# Patient Record
Sex: FEMALE | Race: WHITE | NOT HISPANIC OR LATINO
[De-identification: names, ages, dates, MRNs, and addresses within clinical notes are randomized per-mention and may not be internally consistent; named-entity substitution may affect disease eponyms.]

---

## 2019-03-21 ENCOUNTER — TRANSCRIPTION ENCOUNTER (OUTPATIENT)
Age: 74
End: 2019-03-21

## 2021-11-26 ENCOUNTER — TRANSCRIPTION ENCOUNTER (OUTPATIENT)
Age: 76
End: 2021-11-26

## 2023-03-31 ENCOUNTER — NON-APPOINTMENT (OUTPATIENT)
Age: 78
End: 2023-03-31

## 2024-03-29 ENCOUNTER — NON-APPOINTMENT (OUTPATIENT)
Age: 79
End: 2024-03-29

## 2024-04-01 ENCOUNTER — LABORATORY RESULT (OUTPATIENT)
Age: 79
End: 2024-04-01

## 2024-04-02 ENCOUNTER — APPOINTMENT (OUTPATIENT)
Dept: SURGICAL ONCOLOGY | Facility: CLINIC | Age: 79
End: 2024-04-02
Payer: MEDICARE

## 2024-04-02 VITALS
HEART RATE: 89 BPM | OXYGEN SATURATION: 100 % | DIASTOLIC BLOOD PRESSURE: 78 MMHG | HEIGHT: 68 IN | TEMPERATURE: 97.1 F | BODY MASS INDEX: 24.64 KG/M2 | WEIGHT: 162.6 LBS | SYSTOLIC BLOOD PRESSURE: 116 MMHG

## 2024-04-02 DIAGNOSIS — Z82.61 FAMILY HISTORY OF ARTHRITIS: ICD-10-CM

## 2024-04-02 DIAGNOSIS — Z85.43 PERSONAL HISTORY OF MALIGNANT NEOPLASM OF OVARY: ICD-10-CM

## 2024-04-02 DIAGNOSIS — Z80.7 FAMILY HISTORY OF OTHER MALIGNANT NEOPLASMS OF LYMPHOID, HEMATOPOIETIC AND RELATED TISSUES: ICD-10-CM

## 2024-04-02 DIAGNOSIS — E78.5 HYPERLIPIDEMIA, UNSPECIFIED: ICD-10-CM

## 2024-04-02 DIAGNOSIS — R16.0 HEPATOMEGALY, NOT ELSEWHERE CLASSIFIED: ICD-10-CM

## 2024-04-02 DIAGNOSIS — J45.909 UNSPECIFIED ASTHMA, UNCOMPLICATED: ICD-10-CM

## 2024-04-02 DIAGNOSIS — Z80.3 FAMILY HISTORY OF MALIGNANT NEOPLASM OF BREAST: ICD-10-CM

## 2024-04-02 DIAGNOSIS — Z80.6 FAMILY HISTORY OF LEUKEMIA: ICD-10-CM

## 2024-04-02 DIAGNOSIS — Z82.49 FAMILY HISTORY OF ISCHEMIC HEART DISEASE AND OTHER DISEASES OF THE CIRCULATORY SYSTEM: ICD-10-CM

## 2024-04-02 DIAGNOSIS — I10 ESSENTIAL (PRIMARY) HYPERTENSION: ICD-10-CM

## 2024-04-02 DIAGNOSIS — Z78.9 OTHER SPECIFIED HEALTH STATUS: ICD-10-CM

## 2024-04-02 LAB
AFP-TM SERPL-MCNC: 27 NG/ML
ALBUMIN SERPL ELPH-MCNC: 4.4 G/DL
ALP BLD-CCNC: 264 U/L
ALT SERPL-CCNC: 26 U/L
ANION GAP SERPL CALC-SCNC: 13 MMOL/L
APPEARANCE: CLEAR
APTT BLD: 30.2 SEC
AST SERPL-CCNC: 31 U/L
BASOPHILS # BLD AUTO: 0.03 K/UL
BASOPHILS NFR BLD AUTO: 0.4 %
BILIRUB SERPL-MCNC: 0.3 MG/DL
BILIRUBIN URINE: NEGATIVE
BLOOD URINE: NEGATIVE
BUN SERPL-MCNC: 18 MG/DL
CALCIUM SERPL-MCNC: 10.3 MG/DL
CANCER AG19-9 SERPL-ACNC: <2 U/ML
CEA SERPL-MCNC: 1.7 NG/ML
CHLORIDE SERPL-SCNC: 98 MMOL/L
CO2 SERPL-SCNC: 26 MMOL/L
COLOR: YELLOW
CREAT SERPL-MCNC: 0.92 MG/DL
EGFR: 65 ML/MIN/1.73M2
EOSINOPHIL # BLD AUTO: 0.03 K/UL
EOSINOPHIL NFR BLD AUTO: 0.4 %
GLUCOSE QUALITATIVE U: NEGATIVE MG/DL
GLUCOSE SERPL-MCNC: 107 MG/DL
HCT VFR BLD CALC: 44.2 %
HGB BLD-MCNC: 14 G/DL
IMM GRANULOCYTES NFR BLD AUTO: 0.5 %
INR PPP: 0.97 RATIO
KETONES URINE: NEGATIVE MG/DL
LEUKOCYTE ESTERASE URINE: ABNORMAL
LYMPHOCYTES # BLD AUTO: 1.36 K/UL
LYMPHOCYTES NFR BLD AUTO: 15.9 %
MAN DIFF?: NORMAL
MCHC RBC-ENTMCNC: 30.4 PG
MCHC RBC-ENTMCNC: 31.7 GM/DL
MCV RBC AUTO: 95.9 FL
MONOCYTES # BLD AUTO: 0.41 K/UL
MONOCYTES NFR BLD AUTO: 4.8 %
NEUTROPHILS # BLD AUTO: 6.69 K/UL
NEUTROPHILS NFR BLD AUTO: 78 %
NITRITE URINE: NEGATIVE
PH URINE: 6.5
PLATELET # BLD AUTO: 318 K/UL
POTASSIUM SERPL-SCNC: 5.1 MMOL/L
PROT SERPL-MCNC: 7.1 G/DL
PROTEIN URINE: NEGATIVE MG/DL
PT BLD: 11 SEC
RBC # BLD: 4.61 M/UL
RBC # FLD: 11.8 %
SODIUM SERPL-SCNC: 137 MMOL/L
SPECIFIC GRAVITY URINE: 1.01
UROBILINOGEN URINE: 0.2 MG/DL
WBC # FLD AUTO: 8.56 K/UL

## 2024-04-02 PROCEDURE — 99203 OFFICE O/P NEW LOW 30 MIN: CPT

## 2024-04-02 RX ORDER — AMLODIPINE BESYLATE AND BENAZEPRIL HYDROCHLORIDE 2.5; 1 MG/1; MG/1
2.5-1 CAPSULE ORAL
Refills: 0 | Status: ACTIVE | COMMUNITY

## 2024-04-02 RX ORDER — ATORVASTATIN CALCIUM 10 MG/1
10 TABLET, FILM COATED ORAL
Refills: 0 | Status: ACTIVE | COMMUNITY

## 2024-04-02 RX ORDER — ATENOLOL 25 MG/1
25 TABLET ORAL DAILY
Refills: 0 | Status: ACTIVE | COMMUNITY

## 2024-04-02 RX ORDER — MONTELUKAST 10 MG/1
10 TABLET, FILM COATED ORAL DAILY
Refills: 0 | Status: ACTIVE | COMMUNITY

## 2024-04-02 RX ORDER — ALBUTEROL 90 MCG
90 AEROSOL (GRAM) INHALATION 3 TIMES DAILY
Refills: 0 | Status: ACTIVE | COMMUNITY

## 2024-04-02 NOTE — ASSESSMENT
[FreeTextEntry1] : Impression) liver mass  Plan) reviewed current clinical findings and imaging with the patient and her cousin.  Has a lesion occupying the left lobe of the liver which appears to be most consistent with a primary HCC.  The left portal vein is thrombosed in the tumor is sitting very close to the right portal vein although that may be mass effect from its presence in segment 4B.  The right hepatic vein is free of disease while the left and middle's are clearly involved.  Will need an MRI of the liver with delayed phase imaging to better assess resectability.  We discussed the fact that if it is resectable that will be the primary treatment of choice and if not we will have to consider other liver directed therapies.  It does not appear to be consistent with a metastatic lesion although if there is any doubt after obtaining the MRI we may need to do a liver biopsy.  All questions answered.  We will follow-up after the liver MRI.  Moreno Loredo MD  Chief Surgical Oncology Multidisciplinary GI cancer program [facility]  Professor Surgery [facility] [employer] [facility]   CC Dr. Azevedo  Time spent reviewing images and in consultation 50 minutes

## 2024-04-02 NOTE — HISTORY OF PRESENT ILLNESS
[de-identified] : Patient Name: Rasheeda Cornejo MRN: [id number] Referring Provider: Dr. Oakes Date: 4/2/24 Diagnosis: Liver Mass  77 y/o F with PMHx of HTN, HLD asthma, ovarian cancer s/p TAHBSO in 1/2007 s/p chemotherapy, presents for evaluation of liver mass. Patient had abdominal bloating of which she got a CT A/P on 3/21/24 which showed a hepatic mass 10.5x7.5x9.2 cm with portal vein obstruction.    Currently, Patient denies changes in bowel habits, appetite changes, abdominal pain, nausea, vomiting, unintentional weight loss, fevers or chills. Bowel movements are daily and stools are soft. Denies rectal bleeding.   Functional status: The patient is able to ambulate and function fully without fatigue or dyspnea.  Work Up: 3/21/24: CT A/P- left hepatic lobe is occupied by heterogenous mass, likely represents primary hepatic malignancy, less likely metastasis. The left portal vein is obstructed by mass.    3/26/24: CEA 1.7; AFP: 27 (H), ALK: 264 (H) Last Colonoscopy 2/10/23: No polyps.

## 2024-04-02 NOTE — PHYSICAL EXAM
[Normal] : oriented to person, place and time, with appropriate affect [de-identified] : Not distended, soft.

## 2024-04-03 ENCOUNTER — APPOINTMENT (OUTPATIENT)
Dept: MRI IMAGING | Facility: CLINIC | Age: 79
End: 2024-04-03
Payer: MEDICARE

## 2024-04-03 PROCEDURE — 74183 MRI ABD W/O CNTR FLWD CNTR: CPT

## 2024-04-03 PROCEDURE — A9581: CPT | Mod: JW

## 2024-04-04 ENCOUNTER — TRANSCRIPTION ENCOUNTER (OUTPATIENT)
Age: 79
End: 2024-04-04

## 2024-04-10 ENCOUNTER — NON-APPOINTMENT (OUTPATIENT)
Age: 79
End: 2024-04-10

## 2024-04-10 NOTE — HISTORY OF PRESENT ILLNESS
[de-identified] : Patient Name: Rasheeda Cornejo MRN: [id number] Referring Provider: Dr. Oakes Date of Service: 4/15/24  Diagnosis: Liver Mass  79 y/o F with PMHx of HTN, HLD asthma, ovarian cancer s/p TAHBSO in 1/2007 s/p chemotherapy, presents for evaluation of liver mass. Patient had abdominal bloating of which she got a CT A/P on 3/21/24 which showed a hepatic mass 10.5x7.5x9.2 cm with portal vein obstruction. He is now here to discuss his MRCP results from 4/3/24 which [id number] cm hepatic mass with right intrahepatic biliary duct dilatation and mass effect on the pancreas, Primary differential consideration is cholangiocarcinoma  Currently, MS. Cornejo endorses Patient denies changes in bowel habits, appetite changes, abdominal pain, nausea, vomiting, unintentional weight loss, fevers or chills.  Functional status: The patient is able to ambulate and function fully without fatigue or dyspnea.  Work Up: 2/10/23: last colonoscopy, no polyps 3/21/24: CT A/P  left hepatic lobe is occupied by heterogenous mass, likely represents primary hepatic malignancy, less likely metastasis. The left portal vein is obstructed by mass. 4/1: CA 19-9: <35; bilis WNL; AST 31; ALT 26; Alk phos 264 (H); AFP 27 (H); CEA 1.7; coags WNL 4/3/24: MRCP: Heterogeneously enhancing 12 cm left hepatic mass with associated left greater than right intrahepatic biliary duct dilatation and exophytic component exerting mass effect on the pancreas. Primary differential consideration is cholangiocarcinoma. biopsy suggested

## 2024-04-15 ENCOUNTER — APPOINTMENT (OUTPATIENT)
Dept: INTERVENTIONAL RADIOLOGY/VASCULAR | Facility: HOSPITAL | Age: 79
End: 2024-04-15

## 2024-04-15 ENCOUNTER — APPOINTMENT (OUTPATIENT)
Dept: SURGICAL ONCOLOGY | Facility: CLINIC | Age: 79
End: 2024-04-15

## 2024-04-15 DIAGNOSIS — Z87.19 PERSONAL HISTORY OF OTHER DISEASES OF THE DIGESTIVE SYSTEM: ICD-10-CM

## 2024-04-17 PROBLEM — Z87.19 HISTORY OF GASTROESOPHAGEAL REFLUX (GERD): Status: RESOLVED | Noted: 2024-04-17 | Resolved: 2024-04-17

## 2024-04-18 VITALS
DIASTOLIC BLOOD PRESSURE: 81 MMHG | TEMPERATURE: 98 F | OXYGEN SATURATION: 95 % | HEART RATE: 101 BPM | SYSTOLIC BLOOD PRESSURE: 135 MMHG

## 2024-04-18 NOTE — REASON FOR VISIT
[Consultation] : a consultation visit [Family Member] : family member [FreeTextEntry1] : Left lobe liver tumor

## 2024-04-18 NOTE — HISTORY OF PRESENT ILLNESS
[Stable] : stable [FreeTextEntry1] : Rasheeda Cornejo is a 78 year old woman with past medical/surgical history of treated ovarian cancer who presented several months ago to her PCP with complaints of abdominal discomfort.  CT and MRI demonstrated a 12.6 x 7.6 cm left lobe tumor likely HCC but possibly cholangiocarcinoma

## 2024-04-18 NOTE — ASSESSMENT
[FreeTextEntry1] : Large left lobe hepatic tumor likely HCC but possible cholangiocarcinoma.  Candidate for Y90 .  Bilirubin and albumin within normal limits.  Will perform percutaneous biopsy at time of mapping arteriogram.  Procedure was discussed at length and she had an opportunity to ask questions all of which were answered. [SIRT Procedure & Planning] : SIRT procedure and planning discussed at length

## 2024-04-25 ENCOUNTER — RESULT REVIEW (OUTPATIENT)
Age: 79
End: 2024-04-25

## 2024-04-25 ENCOUNTER — APPOINTMENT (OUTPATIENT)
Dept: INTERVENTIONAL RADIOLOGY/VASCULAR | Facility: HOSPITAL | Age: 79
End: 2024-04-25

## 2024-04-25 ENCOUNTER — OUTPATIENT (OUTPATIENT)
Dept: OUTPATIENT SERVICES | Facility: HOSPITAL | Age: 79
LOS: 1 days | End: 2024-04-25
Payer: MEDICARE

## 2024-04-25 VITALS
DIASTOLIC BLOOD PRESSURE: 79 MMHG | SYSTOLIC BLOOD PRESSURE: 139 MMHG | HEIGHT: 68 IN | OXYGEN SATURATION: 97 % | WEIGHT: 158.95 LBS | HEART RATE: 72 BPM | TEMPERATURE: 98 F | RESPIRATION RATE: 16 BRPM

## 2024-04-25 LAB
ALBUMIN SERPL ELPH-MCNC: 4.3 G/DL — SIGNIFICANT CHANGE UP (ref 3.3–5)
ALP SERPL-CCNC: 247 U/L — HIGH (ref 40–120)
ALT FLD-CCNC: SIGNIFICANT CHANGE UP (ref 10–45)
ANION GAP SERPL CALC-SCNC: 11 MMOL/L — SIGNIFICANT CHANGE UP (ref 5–17)
AST SERPL-CCNC: SIGNIFICANT CHANGE UP (ref 10–40)
BILIRUB SERPL-MCNC: 0.3 MG/DL — SIGNIFICANT CHANGE UP (ref 0.2–1.2)
BUN SERPL-MCNC: 19 MG/DL — SIGNIFICANT CHANGE UP (ref 7–23)
CALCIUM SERPL-MCNC: 10.2 MG/DL — SIGNIFICANT CHANGE UP (ref 8.4–10.5)
CHLORIDE SERPL-SCNC: 99 MMOL/L — SIGNIFICANT CHANGE UP (ref 96–108)
CO2 SERPL-SCNC: 25 MMOL/L — SIGNIFICANT CHANGE UP (ref 22–31)
CREAT SERPL-MCNC: 0.79 MG/DL — SIGNIFICANT CHANGE UP (ref 0.5–1.3)
EGFR: 77 ML/MIN/1.73M2 — SIGNIFICANT CHANGE UP
GLUCOSE SERPL-MCNC: 119 MG/DL — HIGH (ref 70–99)
POTASSIUM SERPL-MCNC: SIGNIFICANT CHANGE UP (ref 3.5–5.3)
POTASSIUM SERPL-SCNC: SIGNIFICANT CHANGE UP (ref 3.5–5.3)
PROT SERPL-MCNC: 7.9 G/DL — SIGNIFICANT CHANGE UP (ref 6–8.3)
SODIUM SERPL-SCNC: 135 MMOL/L — SIGNIFICANT CHANGE UP (ref 135–145)

## 2024-04-25 PROCEDURE — 47000 NEEDLE BIOPSY OF LIVER PERQ: CPT

## 2024-04-25 PROCEDURE — 36248 INS CATH ABD/L-EXT ART ADDL: CPT | Mod: 59

## 2024-04-25 PROCEDURE — 88307 TISSUE EXAM BY PATHOLOGIST: CPT | Mod: 26

## 2024-04-25 PROCEDURE — 78830 RP LOCLZJ TUM SPECT W/CT 1: CPT

## 2024-04-25 PROCEDURE — 78830 RP LOCLZJ TUM SPECT W/CT 1: CPT | Mod: 26

## 2024-04-25 PROCEDURE — 88173 CYTOPATH EVAL FNA REPORT: CPT | Mod: 26

## 2024-04-25 PROCEDURE — 88341 IMHCHEM/IMCYTCHM EA ADD ANTB: CPT

## 2024-04-25 PROCEDURE — 76942 ECHO GUIDE FOR BIOPSY: CPT

## 2024-04-25 PROCEDURE — 88307 TISSUE EXAM BY PATHOLOGIST: CPT

## 2024-04-25 PROCEDURE — 88305 TISSUE EXAM BY PATHOLOGIST: CPT

## 2024-04-25 PROCEDURE — 88173 CYTOPATH EVAL FNA REPORT: CPT

## 2024-04-25 PROCEDURE — 36247 INS CATH ABD/L-EXT ART 3RD: CPT

## 2024-04-25 PROCEDURE — 88342 IMHCHEM/IMCYTCHM 1ST ANTB: CPT | Mod: 26

## 2024-04-25 PROCEDURE — 77263 THER RADIOLOGY TX PLNG CPLX: CPT

## 2024-04-25 PROCEDURE — 88305 TISSUE EXAM BY PATHOLOGIST: CPT | Mod: 26

## 2024-04-25 PROCEDURE — 36245 INS CATH ABD/L-EXT ART 1ST: CPT | Mod: 59,LT

## 2024-04-25 PROCEDURE — 36247 INS CATH ABD/L-EXT ART 3RD: CPT | Mod: LT

## 2024-04-25 PROCEDURE — 75774 ARTERY X-RAY EACH VESSEL: CPT | Mod: 26

## 2024-04-25 PROCEDURE — C1887: CPT

## 2024-04-25 PROCEDURE — A9540: CPT

## 2024-04-25 PROCEDURE — 75726 ARTERY X-RAYS ABDOMEN: CPT

## 2024-04-25 PROCEDURE — 75774 ARTERY X-RAY EACH VESSEL: CPT | Mod: 59

## 2024-04-25 PROCEDURE — 80053 COMPREHEN METABOLIC PANEL: CPT

## 2024-04-25 PROCEDURE — 77300 RADIATION THERAPY DOSE PLAN: CPT | Mod: 26

## 2024-04-25 PROCEDURE — 36248 INS CATH ABD/L-EXT ART ADDL: CPT

## 2024-04-25 PROCEDURE — 77290 THER RAD SIMULAJ FIELD CPLX: CPT

## 2024-04-25 PROCEDURE — 75726 ARTERY X-RAYS ABDOMEN: CPT | Mod: 26,59

## 2024-04-25 PROCEDURE — 76942 ECHO GUIDE FOR BIOPSY: CPT | Mod: 26,59

## 2024-04-25 PROCEDURE — 77300 RADIATION THERAPY DOSE PLAN: CPT

## 2024-04-25 PROCEDURE — 88341 IMHCHEM/IMCYTCHM EA ADD ANTB: CPT | Mod: 26

## 2024-04-25 PROCEDURE — 88360 TUMOR IMMUNOHISTOCHEM/MANUAL: CPT

## 2024-04-25 PROCEDURE — 36415 COLL VENOUS BLD VENIPUNCTURE: CPT

## 2024-04-25 PROCEDURE — 76380 CAT SCAN FOLLOW-UP STUDY: CPT | Mod: XU,MH

## 2024-04-25 PROCEDURE — 88342 IMHCHEM/IMCYTCHM 1ST ANTB: CPT

## 2024-04-25 PROCEDURE — 36245 INS CATH ABD/L-EXT ART 1ST: CPT | Mod: 59

## 2024-04-25 PROCEDURE — 77290 THER RAD SIMULAJ FIELD CPLX: CPT | Mod: 26

## 2024-04-25 PROCEDURE — C1769: CPT

## 2024-04-25 PROCEDURE — C1894: CPT

## 2024-04-25 NOTE — PRE-ANESTHESIA EVALUATION ADULT - NSANTHOSAYNRD_GEN_A_CORE
No. BHARTI screening performed.  STOP BANG Legend: 0-2 = LOW Risk; 3-4 = INTERMEDIATE Risk; 5-8 = HIGH Risk

## 2024-04-25 NOTE — PRE-ANESTHESIA EVALUATION ADULT - NSANTHPMHFT_GEN_ALL_CORE
PMHx: Mild Asthma (not intubated or Hospitalized, just 1 ER visit many years ago), HTN, Ovarian Ca, Hepatoma    PSxHx: Ovarian Cystectomy, LUIS/BSO/[place], Incisional Hernia Repair, Bilat Cataract

## 2024-04-29 LAB — NON-GYNECOLOGICAL CYTOLOGY STUDY: SIGNIFICANT CHANGE UP

## 2024-04-30 LAB — SURGICAL PATHOLOGY STUDY: SIGNIFICANT CHANGE UP

## 2024-05-01 ENCOUNTER — RESULT REVIEW (OUTPATIENT)
Age: 79
End: 2024-05-01

## 2024-05-01 ENCOUNTER — OUTPATIENT (OUTPATIENT)
Dept: OUTPATIENT SERVICES | Facility: HOSPITAL | Age: 79
LOS: 1 days | End: 2024-05-01
Payer: MEDICARE

## 2024-05-01 ENCOUNTER — APPOINTMENT (OUTPATIENT)
Dept: INTERVENTIONAL RADIOLOGY/VASCULAR | Facility: HOSPITAL | Age: 79
End: 2024-05-01

## 2024-05-01 VITALS — WEIGHT: 147.71 LBS | HEIGHT: 68 IN

## 2024-05-01 DIAGNOSIS — D37.6 NEOPLASM OF UNCERTAIN BEHAVIOR OF LIVER, GALLBLADDER AND BILE DUCTS: ICD-10-CM

## 2024-05-01 PROCEDURE — 79445 NUCLEAR RX INTRA-ARTERIAL: CPT | Mod: 26

## 2024-05-01 PROCEDURE — C1894: CPT

## 2024-05-01 PROCEDURE — 78830 RP LOCLZJ TUM SPECT W/CT 1: CPT | Mod: 26

## 2024-05-01 PROCEDURE — C1769: CPT

## 2024-05-01 PROCEDURE — 78830 RP LOCLZJ TUM SPECT W/CT 1: CPT

## 2024-05-01 PROCEDURE — 36247 INS CATH ABD/L-EXT ART 3RD: CPT | Mod: LT

## 2024-05-01 PROCEDURE — 36247 INS CATH ABD/L-EXT ART 3RD: CPT

## 2024-05-01 PROCEDURE — C1887: CPT

## 2024-05-01 PROCEDURE — C2616: CPT

## 2024-05-01 PROCEDURE — 37243 VASC EMBOLIZE/OCCLUDE ORGAN: CPT

## 2024-05-01 PROCEDURE — 79445 NUCLEAR RX INTRA-ARTERIAL: CPT

## 2024-05-01 PROCEDURE — 36248 INS CATH ABD/L-EXT ART ADDL: CPT

## 2024-05-15 ENCOUNTER — LABORATORY RESULT (OUTPATIENT)
Age: 79
End: 2024-05-15

## 2024-05-16 ENCOUNTER — APPOINTMENT (OUTPATIENT)
Dept: INTERVENTIONAL RADIOLOGY/VASCULAR | Facility: HOSPITAL | Age: 79
End: 2024-05-16

## 2024-05-16 PROCEDURE — XXXXX: CPT

## 2024-06-06 ENCOUNTER — APPOINTMENT (OUTPATIENT)
Dept: HEMATOLOGY ONCOLOGY | Facility: CLINIC | Age: 79
End: 2024-06-06
Payer: MEDICARE

## 2024-06-06 VITALS
BODY MASS INDEX: 23.95 KG/M2 | TEMPERATURE: 99.4 F | DIASTOLIC BLOOD PRESSURE: 77 MMHG | OXYGEN SATURATION: 96 % | HEIGHT: 68 IN | WEIGHT: 158 LBS | SYSTOLIC BLOOD PRESSURE: 134 MMHG | RESPIRATION RATE: 18 BRPM | HEART RATE: 81 BPM

## 2024-06-06 PROCEDURE — 99204 OFFICE O/P NEW MOD 45 MIN: CPT

## 2024-06-06 RX ORDER — OMEPRAZOLE MAGNESIUM 40 MG/1
CAPSULE, DELAYED RELEASE ORAL
Refills: 0 | Status: ACTIVE | COMMUNITY

## 2024-06-06 RX ORDER — LORATADINE 10 MG/1
TABLET ORAL
Refills: 0 | Status: ACTIVE | COMMUNITY

## 2024-06-10 ENCOUNTER — APPOINTMENT (OUTPATIENT)
Dept: INTERVENTIONAL RADIOLOGY/VASCULAR | Facility: HOSPITAL | Age: 79
End: 2024-06-10
Payer: MEDICARE

## 2024-06-10 ENCOUNTER — OUTPATIENT (OUTPATIENT)
Dept: OUTPATIENT SERVICES | Facility: HOSPITAL | Age: 79
LOS: 1 days | End: 2024-06-10
Payer: MEDICARE

## 2024-06-10 ENCOUNTER — RESULT REVIEW (OUTPATIENT)
Age: 79
End: 2024-06-10

## 2024-06-10 VITALS
HEART RATE: 75 BPM | SYSTOLIC BLOOD PRESSURE: 119 MMHG | HEIGHT: 68 IN | DIASTOLIC BLOOD PRESSURE: 74 MMHG | WEIGHT: 158.95 LBS | TEMPERATURE: 99 F | OXYGEN SATURATION: 97 % | RESPIRATION RATE: 16 BRPM

## 2024-06-10 LAB
ALBUMIN SERPL ELPH-MCNC: 4 G/DL — SIGNIFICANT CHANGE UP (ref 3.3–5)
ALP SERPL-CCNC: 313 U/L — HIGH (ref 40–120)
ALT FLD-CCNC: 27 U/L — SIGNIFICANT CHANGE UP (ref 10–45)
ANION GAP SERPL CALC-SCNC: 10 MMOL/L — SIGNIFICANT CHANGE UP (ref 5–17)
AST SERPL-CCNC: 35 U/L — SIGNIFICANT CHANGE UP (ref 10–40)
BASOPHILS # BLD AUTO: 0.05 K/UL — SIGNIFICANT CHANGE UP (ref 0–0.2)
BASOPHILS NFR BLD AUTO: 0.8 % — SIGNIFICANT CHANGE UP (ref 0–2)
BILIRUB SERPL-MCNC: 0.3 MG/DL — SIGNIFICANT CHANGE UP (ref 0.2–1.2)
BUN SERPL-MCNC: 22 MG/DL — SIGNIFICANT CHANGE UP (ref 7–23)
CALCIUM SERPL-MCNC: 10 MG/DL — SIGNIFICANT CHANGE UP (ref 8.4–10.5)
CHLORIDE SERPL-SCNC: 101 MMOL/L — SIGNIFICANT CHANGE UP (ref 96–108)
CO2 SERPL-SCNC: 27 MMOL/L — SIGNIFICANT CHANGE UP (ref 22–31)
CREAT SERPL-MCNC: 0.89 MG/DL — SIGNIFICANT CHANGE UP (ref 0.5–1.3)
EGFR: 67 ML/MIN/1.73M2 — SIGNIFICANT CHANGE UP
EOSINOPHIL # BLD AUTO: 0.15 K/UL — SIGNIFICANT CHANGE UP (ref 0–0.5)
EOSINOPHIL NFR BLD AUTO: 2.3 % — SIGNIFICANT CHANGE UP (ref 0–6)
GLUCOSE SERPL-MCNC: 107 MG/DL — HIGH (ref 70–99)
HCT VFR BLD CALC: 40 % — SIGNIFICANT CHANGE UP (ref 34.5–45)
HGB BLD-MCNC: 12.8 G/DL — SIGNIFICANT CHANGE UP (ref 11.5–15.5)
IMM GRANULOCYTES NFR BLD AUTO: 0.8 % — SIGNIFICANT CHANGE UP (ref 0–0.9)
LYMPHOCYTES # BLD AUTO: 0.58 K/UL — LOW (ref 1–3.3)
LYMPHOCYTES # BLD AUTO: 8.7 % — LOW (ref 13–44)
MCHC RBC-ENTMCNC: 30.2 PG — SIGNIFICANT CHANGE UP (ref 27–34)
MCHC RBC-ENTMCNC: 32 GM/DL — SIGNIFICANT CHANGE UP (ref 32–36)
MCV RBC AUTO: 94.3 FL — SIGNIFICANT CHANGE UP (ref 80–100)
MONOCYTES # BLD AUTO: 0.38 K/UL — SIGNIFICANT CHANGE UP (ref 0–0.9)
MONOCYTES NFR BLD AUTO: 5.7 % — SIGNIFICANT CHANGE UP (ref 2–14)
NEUTROPHILS # BLD AUTO: 5.43 K/UL — SIGNIFICANT CHANGE UP (ref 1.8–7.4)
NEUTROPHILS NFR BLD AUTO: 81.7 % — HIGH (ref 43–77)
NRBC # BLD: 0 /100 WBCS — SIGNIFICANT CHANGE UP (ref 0–0)
PLATELET # BLD AUTO: 235 K/UL — SIGNIFICANT CHANGE UP (ref 150–400)
POTASSIUM SERPL-MCNC: 4.2 MMOL/L — SIGNIFICANT CHANGE UP (ref 3.5–5.3)
POTASSIUM SERPL-SCNC: 4.2 MMOL/L — SIGNIFICANT CHANGE UP (ref 3.5–5.3)
PROT SERPL-MCNC: 7.4 G/DL — SIGNIFICANT CHANGE UP (ref 6–8.3)
RBC # BLD: 4.24 M/UL — SIGNIFICANT CHANGE UP (ref 3.8–5.2)
RBC # FLD: 12.4 % — SIGNIFICANT CHANGE UP (ref 10.3–14.5)
SODIUM SERPL-SCNC: 138 MMOL/L — SIGNIFICANT CHANGE UP (ref 135–145)
WBC # BLD: 6.64 K/UL — SIGNIFICANT CHANGE UP (ref 3.8–10.5)
WBC # FLD AUTO: 6.64 K/UL — SIGNIFICANT CHANGE UP (ref 3.8–10.5)

## 2024-06-10 PROCEDURE — C1894: CPT

## 2024-06-10 PROCEDURE — 78830 RP LOCLZJ TUM SPECT W/CT 1: CPT | Mod: 26

## 2024-06-10 PROCEDURE — 36247 INS CATH ABD/L-EXT ART 3RD: CPT | Mod: RT

## 2024-06-10 PROCEDURE — 36415 COLL VENOUS BLD VENIPUNCTURE: CPT

## 2024-06-10 PROCEDURE — C1887: CPT

## 2024-06-10 PROCEDURE — C2616: CPT

## 2024-06-10 PROCEDURE — 78830 RP LOCLZJ TUM SPECT W/CT 1: CPT

## 2024-06-10 PROCEDURE — 36248 INS CATH ABD/L-EXT ART ADDL: CPT

## 2024-06-10 PROCEDURE — 79445 NUCLEAR RX INTRA-ARTERIAL: CPT

## 2024-06-10 PROCEDURE — 79445 NUCLEAR RX INTRA-ARTERIAL: CPT | Mod: 26

## 2024-06-10 PROCEDURE — 37243 VASC EMBOLIZE/OCCLUDE ORGAN: CPT

## 2024-06-10 PROCEDURE — 85025 COMPLETE CBC W/AUTO DIFF WBC: CPT

## 2024-06-10 PROCEDURE — C1769: CPT

## 2024-06-10 PROCEDURE — 36247 INS CATH ABD/L-EXT ART 3RD: CPT

## 2024-06-10 PROCEDURE — 80053 COMPREHEN METABOLIC PANEL: CPT

## 2024-06-10 NOTE — HISTORY OF PRESENT ILLNESS
[de-identified] : 78 F with PMHx of HTN, HLD asthma, ovarian cancer, presents for 2nd opinion of recently diagnosed cholangiocarcinoma.  Oncologic History: 1.  History of ovarian cancer  --s/p TAHBSO in 1/2007, s/p chemotherapy cisplatin/taxol per patient  2.  cholangiocarcinoma - Initially presented to PCP occasional bouts of GERD. Only intermittent relief with Prilosec. Referred to new GI, found to have hepatic mass on exam only associated with a general feeling of tightness. No weight loss, abd pain, or changes in BM. - Colonoscopy 2023 by GI reported normal. - CT Abdomen w/ contrast (3/21/24): Left hepatic lobe with heterogeneous mass 10.5x7.5x9.2 cm with portal vein obstruction by mass. - Initial tumor markers (3/26/24): CEA 1.7; AFP: 27 (H), ALK: 264 (H) - MR Abdomen (4/3/24): heterogeneously enhancing 11.9 x 7.6 cm mass with small cystic component is noted in the left lobe, left greater than right intrahepatic biliary duct dilatation and exophytic component exerting mass effect on the pancreas. - On 4/25/24 Underwent percutaneous biopsy at time of mapping arteriogram, for Y90 of large left lobe hepatic tumor likely HCC but possible cholangiocarcinoma. - Pathology (4/30/24) from liver biopsy showing moderately differentiated adenocarcinoma, primary intrahepatic cholangiocarcinoma favored, though other GI cannot be excluded. Mismatch repair proteins intact. 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  TMB = 5 mut/mB, MSI-H not detected.  Additional mutations in ARID1A, ASXL1, MYC, RB1.  Cancer screening: - Uptodate on colorectal and breast cancer screening. Pending skin exam Family History: - Sister leukemia. Mother breast cancer. Father lymphoma. Paternal grandfather unspecified GI malignancy. Does not have children or other first degree relatives. 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  denies smoking, ETOH.  no children.  [de-identified] : Patient seen today for oncologic second opinion, cousin Mckenna acompanied patient. 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 The vestibular episodes have been accompanied by episodes of diziness, occasional nausea, limiting day to day. However, now back to 90% of normal, appetite returned. Energy level is fair, but doing ADLs. Normal brown bowel movements. Slight R>L swelling of LE. No other acute complaints.

## 2024-06-10 NOTE — PHYSICAL EXAM
[Restricted in physically strenuous activity but ambulatory and able to carry out work of a light or sedentary nature] : Status 1- Restricted in physically strenuous activity but ambulatory and able to carry out work of a light or sedentary nature, e.g., light house work, office work [Normal] : affect appropriate [de-identified] : R>L lower extremity edema [de-identified] : right upper quadrant/epigastric mass like object felt, non tender, no rebound or guarding

## 2024-06-10 NOTE — REVIEW OF SYSTEMS
[Fatigue] : fatigue [Dizziness] : dizziness [Difficulty Walking] : difficulty walking [Negative] : Heme/Lymph [Fever] : no fever [Chills] : no chills [Recent Change In Weight] : ~T no recent weight change [FreeTextEntry5] : slight LE R edema [de-identified] : intermittent episodes, improving, 2/2 vestibular neuritis. No motor or sensation deficits.

## 2024-06-10 NOTE — ASSESSMENT
[FreeTextEntry1] : 78 F with PMHx of HTN, HLD asthma, ovarian cancer, presents for 2nd opinion of recently diagnosed cholangiocarcinoma.  # Unresectable intrahepatic cholangiocarcinoma -- Reviewed available labs, imaging, pathology, diagnosis, and treatment with patient and cousin on initial visit. Overall, clinical picture concerning for advanced intrahepatic cholangiocarcinoma, unresectable due to obstruction of the portal vein. -- Excellent performance status: ECOG 1 -- Concur with original oncologist plan. Standard of care for unresectable intrahepatic cholangiocarcinoma in patient's with good performance status and no hyperbilirubinemia is combination chemotherapy + immunotherapy. Treatment with Durvalumab + gemcitabine + cisplatin is a category 1 NCCN recommendation. -- Briefly reviewed common systemic therapy side effects including but not limited to renal and hepatic dysfunction, low blood counts, and hair thinning/loss. Full informed consent to be obtained after completion of Y90 and subsequent imaging if receiving care at [facility] -- Reviewed rationale of germline testing given presence of BAP1 mutation onf peripheral blood NGS. Patient wishes to defer at this time.  Plan: - Complete staging work up with CT Chest with IV contrast - Will consider sending for foundation one testing on biopsy sample - f/u with Dr. May for remainder of Y90 treatment - Will schedule a follow up appointment in July after subsequent imaging to discuss timing of systemic therapy initiation. Will discuss arrangements for medi-port placement and obtain informed consent at that time. - Patient and cousin in agreement with plan and verbalized understanding. All questions answered.  RTC approximately mid-July after MRI abdomen

## 2024-06-10 NOTE — END OF VISIT
[] : Fellow [FreeTextEntry3] : I evaluated the patient with the Hematology/Oncology fellow, Dr Alan.  Briefly, the patient is a 78 year old female who is here for a 2nd opinion for recently diagnosed unresectable intrahepatic cholangiocarcinoma.  PMH is notable for ovarian cancer s/p LUIS/BSO followed by adjuvant platinum/taxane based chemotherapy in 2007.  Oncologic history; 1.  ovarian ca - 2007- LUIS/BSO, adjuvant chemotherapy  2.  intrahepatic cholangiocarcinoma --presentation:  abdominal discomfort. --CT abdomen 3/2024:  Mass in L lobe of liver with portal vein obstruction.  Mass measures 10 x 7 x 9 cm. --AFP 27, CA 19-9 normal, CEA normal. --MRI abdomen 4/2024:  11 x 7 cm mass in the L lobe.  exophytic component exerting mass efffect on pancreas.   --saw surgical oncologist.  Unresectable disease. --Biopsy:  adenocarcinoma.  MMR intact by IHC.  CK7, CK20 positive.  negative CDX2, TTF1, Gata3, PAX8, ER.  Favor cholangiocarcinoma. 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 TMB = 5 mut/mB, MSI-H not detected. Additional mutations in ARID1A, ASXL1, MYC, RB1. --Y-90 radioembolization 5/1/24.  2nd procedure planned 6/10/24  Pt presents for 2nd opinion.  Consulted w/ Dr Inge Larsen who recommended chemoimmunotherapy with cisplatin, gemcitabine, and durvalumab.    Plan: 1.  cholangiocarcinoma, intrahepatic, unresectable --history, imaging studies and biopsy results reviewed w/ the patient and her cousin --recommend chest CT scan to complete staging evaluation --discussed BAP1 mutation identified on the foundation one NGS blood report.  germline BAP1 mutations have been associated w/ hereditary cholangiocarcinoma.  Based on the type of mutation and variant allele frequency reported, germline mutation seems unlikely in her case, but nevertheless recommend consultation w/ genetic counselor for review.  She declines, rationalizing that she does not have any relatives who it may influence, and she prefers not to have that information. --discussed cancer-directed treatment.  Agree w/ her oncologist that chemoimmunotherapy with gem/cis/durva is the standard of care for 1st line treatment for advanced/unresectable cholangiocarcinoma.  Risks and side effects reviewed.  Would suggest waiting to initiate chemoimmunotherapy until after her MRI on 7/10/24. --mediport placement is encouraged for this treatment regimen --pt is still considering where she wants to receive treatment.  She was provided w/ a tour of our infusion ctr at her request.  If she opts for treatment at our center, I requested that she follow up with us after the MRI on 7/10/24 for consent.  all questions answered to her apparent satisfaction.

## 2024-06-10 NOTE — PRE-ANESTHESIA EVALUATION ADULT - NSANTHPMHFT_GEN_ALL_CORE
PMHx: Ovarian Ca 2007, HTN, HLD, Mild Asthma, Hepatocellular Ca diagnosed 4/ 2024    PSxHx: TAHBSO 2007, Ovarian Cystectomy, Incissional Hernia Repair, Cataracts

## 2024-06-27 ENCOUNTER — APPOINTMENT (OUTPATIENT)
Dept: INTERVENTIONAL RADIOLOGY/VASCULAR | Facility: HOSPITAL | Age: 79
End: 2024-06-27

## 2024-06-27 PROCEDURE — XXXXX: CPT

## 2024-07-02 PROBLEM — C22.1 CHOLANGIOCARCINOMA: Status: ACTIVE | Noted: 2024-06-06

## 2024-07-09 ENCOUNTER — NON-APPOINTMENT (OUTPATIENT)
Age: 79
End: 2024-07-09

## 2024-07-15 ENCOUNTER — APPOINTMENT (OUTPATIENT)
Dept: MRI IMAGING | Facility: HOSPITAL | Age: 79
End: 2024-07-15
Payer: MEDICARE

## 2024-07-15 ENCOUNTER — OUTPATIENT (OUTPATIENT)
Dept: OUTPATIENT SERVICES | Facility: HOSPITAL | Age: 79
LOS: 1 days | End: 2024-07-15
Payer: MEDICARE

## 2024-07-15 PROCEDURE — A9585: CPT

## 2024-07-15 PROCEDURE — 74183 MRI ABD W/O CNTR FLWD CNTR: CPT

## 2024-07-15 PROCEDURE — 74183 MRI ABD W/O CNTR FLWD CNTR: CPT | Mod: 26,MH

## 2024-07-17 ENCOUNTER — APPOINTMENT (OUTPATIENT)
Dept: HEMATOLOGY ONCOLOGY | Facility: CLINIC | Age: 79
End: 2024-07-17
Payer: MEDICARE

## 2024-07-17 VITALS
BODY MASS INDEX: 23.34 KG/M2 | OXYGEN SATURATION: 98 % | RESPIRATION RATE: 18 BRPM | DIASTOLIC BLOOD PRESSURE: 86 MMHG | HEART RATE: 83 BPM | SYSTOLIC BLOOD PRESSURE: 144 MMHG | WEIGHT: 154 LBS | HEIGHT: 68 IN | TEMPERATURE: 97.9 F

## 2024-07-17 DIAGNOSIS — T45.1X5A TOXIC GASTROENTERITIS AND COLITIS: ICD-10-CM

## 2024-07-17 DIAGNOSIS — T45.1X5A NAUSEA WITH VOMITING, UNSPECIFIED: ICD-10-CM

## 2024-07-17 DIAGNOSIS — R11.2 NAUSEA WITH VOMITING, UNSPECIFIED: ICD-10-CM

## 2024-07-17 DIAGNOSIS — K52.1 TOXIC GASTROENTERITIS AND COLITIS: ICD-10-CM

## 2024-07-17 DIAGNOSIS — C22.1 INTRAHEPATIC BILE DUCT CARCINOMA: ICD-10-CM

## 2024-07-17 LAB
ALBUMIN SERPL ELPH-MCNC: 3.6 G/DL
ALP BLD-CCNC: 168 U/L
ALT SERPL-CCNC: 19 U/L
ANION GAP SERPL CALC-SCNC: -3 MMOL/L
APTT BLD: 34.3 SEC
AST SERPL-CCNC: 26 U/L
BILIRUB SERPL-MCNC: 0.6 MG/DL
BUN SERPL-MCNC: 16 MG/DL
CALCIUM SERPL-MCNC: 10.5 MG/DL
CHLORIDE SERPL-SCNC: 105 MMOL/L
CO2 SERPL-SCNC: 31 MMOL/L
CREAT SERPL-MCNC: 1 MG/DL
EGFR: 58 ML/MIN/1.73M2
GLUCOSE SERPL-MCNC: 102 MG/DL
HBV CORE IGG+IGM SER QL: NONREACTIVE
HBV SURFACE AB SER QL: NONREACTIVE
HBV SURFACE AG SER QL: NONREACTIVE
HCT VFR BLD CALC: 38.6 %
HGB BLD-MCNC: 12.5 G/DL
INR PPP: 0.95
LYMPHOCYTES # BLD AUTO: 0.5 K/UL
LYMPHOCYTES NFR BLD AUTO: 5.8 %
MAN DIFF?: NO
MCHC RBC-ENTMCNC: 30.1 PG
MCHC RBC-ENTMCNC: 32.4 GM/DL
MCV RBC AUTO: 93 FL
NEUTROPHILS # BLD AUTO: 8.1 K/UL
NEUTROPHILS NFR BLD AUTO: 88.2 %
PLATELET # BLD AUTO: 262 K/UL
POTASSIUM SERPL-SCNC: 5.2 MMOL/L
PROT SERPL-MCNC: 7.2 G/DL
PT BLD: 10.8 SEC
RBC # BLD: 4.15 M/UL
RBC # FLD: 12.7 %
SODIUM SERPL-SCNC: 133 MMOL/L
WBC # FLD AUTO: 9.1 K/UL

## 2024-07-17 PROCEDURE — 99215 OFFICE O/P EST HI 40 MIN: CPT | Mod: 25

## 2024-07-17 PROCEDURE — 36415 COLL VENOUS BLD VENIPUNCTURE: CPT

## 2024-07-17 RX ORDER — LOPERAMIDE HYDROCHLORIDE 2 MG/1
2 CAPSULE ORAL
Qty: 80 | Refills: 4 | Status: ACTIVE | COMMUNITY
Start: 2024-07-17 | End: 1900-01-01

## 2024-07-17 RX ORDER — ONDANSETRON 8 MG/1
8 TABLET, ORALLY DISINTEGRATING ORAL EVERY 8 HOURS
Qty: 60 | Refills: 5 | Status: ACTIVE | COMMUNITY
Start: 2024-07-17 | End: 1900-01-01

## 2024-07-18 ENCOUNTER — APPOINTMENT (OUTPATIENT)
Dept: INTERVENTIONAL RADIOLOGY/VASCULAR | Facility: HOSPITAL | Age: 79
End: 2024-07-18

## 2024-07-19 LAB
AFP-TM SERPL-MCNC: 12 NG/ML
CANCER AG19-9 SERPL-ACNC: <2 U/ML
CEA SERPL-MCNC: 1.7 NG/ML

## 2024-07-22 ENCOUNTER — APPOINTMENT (OUTPATIENT)
Dept: INTERVENTIONAL RADIOLOGY/VASCULAR | Facility: HOSPITAL | Age: 79
End: 2024-07-22

## 2024-07-22 ENCOUNTER — RESULT REVIEW (OUTPATIENT)
Age: 79
End: 2024-07-22

## 2024-07-22 RX ORDER — ZINC SULFATE 50(220)MG
220 (50 ZN) CAPSULE ORAL
Qty: 60 | Refills: 0 | Status: ACTIVE | COMMUNITY
Start: 2024-06-21

## 2024-07-22 RX ORDER — LORAZEPAM 0.5 MG/1
0.5 TABLET ORAL
Qty: 30 | Refills: 0 | Status: ACTIVE | COMMUNITY
Start: 2024-06-05

## 2024-07-24 ENCOUNTER — APPOINTMENT (OUTPATIENT)
Dept: CT IMAGING | Facility: HOSPITAL | Age: 79
End: 2024-07-24

## 2024-07-31 ENCOUNTER — NON-APPOINTMENT (OUTPATIENT)
Age: 79
End: 2024-07-31

## 2024-08-01 RX ORDER — DEXTROSE MONOHYDRATE, SODIUM CHLORIDE, SODIUM LACTATE, CALCIUM CHLORIDE, MAGNESIUM CHLORIDE 1.5; 538; 448; 18.4; 5.08 G/100ML; MG/100ML; MG/100ML; MG/100ML; MG/100ML
1000 SOLUTION INTRAPERITONEAL
Refills: 0 | Status: COMPLETED | OUTPATIENT
Start: 2024-08-02 | End: 2024-08-02

## 2024-08-02 ENCOUNTER — APPOINTMENT (OUTPATIENT)
Dept: INFUSION THERAPY | Facility: CLINIC | Age: 79
End: 2024-08-02

## 2024-08-02 ENCOUNTER — OUTPATIENT (OUTPATIENT)
Dept: OUTPATIENT SERVICES | Facility: HOSPITAL | Age: 79
LOS: 1 days | End: 2024-08-02
Payer: MEDICARE

## 2024-08-02 VITALS
WEIGHT: 156.09 LBS | RESPIRATION RATE: 18 BRPM | DIASTOLIC BLOOD PRESSURE: 82 MMHG | HEIGHT: 68 IN | OXYGEN SATURATION: 97 % | HEART RATE: 94 BPM | TEMPERATURE: 98 F | SYSTOLIC BLOOD PRESSURE: 131 MMHG

## 2024-08-02 VITALS
HEART RATE: 73 BPM | RESPIRATION RATE: 18 BRPM | DIASTOLIC BLOOD PRESSURE: 67 MMHG | TEMPERATURE: 98 F | SYSTOLIC BLOOD PRESSURE: 115 MMHG | OXYGEN SATURATION: 97 %

## 2024-08-02 DIAGNOSIS — C22.1 INTRAHEPATIC BILE DUCT CARCINOMA: ICD-10-CM

## 2024-08-02 LAB
ALBUMIN SERPL ELPH-MCNC: 3.9 G/DL — SIGNIFICANT CHANGE UP (ref 3.3–5)
ALP SERPL-CCNC: 159 U/L — HIGH (ref 40–120)
ALT FLD-CCNC: 20 U/L — SIGNIFICANT CHANGE UP (ref 10–45)
ANION GAP SERPL CALC-SCNC: 3 MMOL/L — LOW (ref 5–17)
AST SERPL-CCNC: 31 U/L — SIGNIFICANT CHANGE UP (ref 10–40)
BILIRUB SERPL-MCNC: 0.7 MG/DL — SIGNIFICANT CHANGE UP (ref 0.2–1.2)
BUN SERPL-MCNC: 13 MG/DL — SIGNIFICANT CHANGE UP (ref 7–23)
CALCIUM SERPL-MCNC: 10.3 MG/DL — SIGNIFICANT CHANGE UP (ref 8.4–10.5)
CHLORIDE SERPL-SCNC: 103 MMOL/L — SIGNIFICANT CHANGE UP (ref 96–108)
CO2 SERPL-SCNC: 29 MMOL/L — SIGNIFICANT CHANGE UP (ref 22–31)
CREAT SERPL-MCNC: 0.8 MG/DL — SIGNIFICANT CHANGE UP (ref 0.5–1.3)
EGFR: 76 ML/MIN/1.73M2 — SIGNIFICANT CHANGE UP
GLUCOSE SERPL-MCNC: 111 MG/DL — HIGH (ref 70–99)
HCT VFR BLD CALC: 37.5 % — SIGNIFICANT CHANGE UP (ref 34.5–45)
HGB BLD-MCNC: 12.3 G/DL — SIGNIFICANT CHANGE UP (ref 11.5–15.5)
LYMPHOCYTES # BLD AUTO: 0.6 K/UL — LOW (ref 1–3.3)
LYMPHOCYTES # BLD AUTO: 8.5 % — LOW (ref 13–44)
MAGNESIUM SERPL-MCNC: 1.9 MG/DL — SIGNIFICANT CHANGE UP (ref 1.6–2.6)
MCHC RBC-ENTMCNC: 30.2 PG — SIGNIFICANT CHANGE UP (ref 27–34)
MCHC RBC-ENTMCNC: 32.8 GM/DL — SIGNIFICANT CHANGE UP (ref 32–36)
MCV RBC AUTO: 92.1 FL — SIGNIFICANT CHANGE UP (ref 80–100)
NEUTROPHILS # BLD AUTO: 6.2 K/UL — SIGNIFICANT CHANGE UP (ref 1.8–7.4)
NEUTROPHILS NFR BLD AUTO: 85.7 % — HIGH (ref 43–77)
PLATELET # BLD AUTO: 207 K/UL — SIGNIFICANT CHANGE UP (ref 150–400)
POTASSIUM SERPL-MCNC: 5 MMOL/L — SIGNIFICANT CHANGE UP (ref 3.5–5.3)
POTASSIUM SERPL-SCNC: 5 MMOL/L — SIGNIFICANT CHANGE UP (ref 3.5–5.3)
PROT SERPL-MCNC: 7.2 G/DL — SIGNIFICANT CHANGE UP (ref 6–8.3)
RBC # BLD: 4.07 M/UL — SIGNIFICANT CHANGE UP (ref 3.8–5.2)
RBC # FLD: 12.9 % — SIGNIFICANT CHANGE UP (ref 10.3–14.5)
SODIUM SERPL-SCNC: 135 MMOL/L — SIGNIFICANT CHANGE UP (ref 135–145)
TSH SERPL-MCNC: 0.96 UIU/ML — SIGNIFICANT CHANGE UP (ref 0.27–4.2)
WBC # BLD: 7.2 K/UL — SIGNIFICANT CHANGE UP (ref 3.8–10.5)
WBC # FLD AUTO: 7.2 K/UL — SIGNIFICANT CHANGE UP (ref 3.8–10.5)

## 2024-08-02 PROCEDURE — 96417 CHEMO IV INFUS EACH ADDL SEQ: CPT

## 2024-08-02 PROCEDURE — 96413 CHEMO IV INFUSION 1 HR: CPT

## 2024-08-02 PROCEDURE — 36415 COLL VENOUS BLD VENIPUNCTURE: CPT

## 2024-08-02 PROCEDURE — 96367 TX/PROPH/DG ADDL SEQ IV INF: CPT

## 2024-08-02 PROCEDURE — 85025 COMPLETE CBC W/AUTO DIFF WBC: CPT

## 2024-08-02 PROCEDURE — 83735 ASSAY OF MAGNESIUM: CPT

## 2024-08-02 PROCEDURE — 96375 TX/PRO/DX INJ NEW DRUG ADDON: CPT

## 2024-08-02 PROCEDURE — 80053 COMPREHEN METABOLIC PANEL: CPT

## 2024-08-02 PROCEDURE — 84443 ASSAY THYROID STIM HORMONE: CPT

## 2024-08-02 RX ORDER — DEXAMETHASONE 1.5 MG/1
10 TABLET ORAL ONCE
Refills: 0 | Status: COMPLETED | OUTPATIENT
Start: 2024-08-02 | End: 2024-08-02

## 2024-08-02 RX ORDER — PALONOSETRON HYDROCHLORIDE 0.25 MG/5ML
0.25 INJECTION, SOLUTION INTRAVENOUS ONCE
Refills: 0 | Status: COMPLETED | OUTPATIENT
Start: 2024-08-02 | End: 2024-08-02

## 2024-08-02 RX ORDER — GEMCITABINE 38 MG/ML
1800 INJECTION, SOLUTION INTRAVENOUS ONCE
Refills: 0 | Status: COMPLETED | OUTPATIENT
Start: 2024-08-02 | End: 2024-08-02

## 2024-08-02 RX ORDER — BACTERIOSTATIC SODIUM CHLORIDE 0.9 %
500 VIAL (ML) INJECTION ONCE
Refills: 0 | Status: COMPLETED | OUTPATIENT
Start: 2024-08-02 | End: 2024-08-02

## 2024-08-02 RX ORDER — FOSAPREPITANT DIMEGLUMINE 150 MG/5ML
150 INJECTION, POWDER, LYOPHILIZED, FOR SOLUTION INTRAVENOUS ONCE
Refills: 0 | Status: COMPLETED | OUTPATIENT
Start: 2024-08-02 | End: 2024-08-02

## 2024-08-02 RX ORDER — BACTERIOSTATIC SODIUM CHLORIDE 0.9 %
10 VIAL (ML) INJECTION ONCE
Refills: 0 | Status: COMPLETED | OUTPATIENT
Start: 2024-08-02 | End: 2024-08-02

## 2024-08-02 RX ORDER — LIDOCAINE 5% 5 G/100G
1 CREAM TOPICAL ONCE
Refills: 0 | Status: COMPLETED | OUTPATIENT
Start: 2024-08-02 | End: 2024-08-02

## 2024-08-02 RX ORDER — DURVALUMAB 500 MG/10ML
1500 INJECTION, SOLUTION INTRAVENOUS ONCE
Refills: 0 | Status: COMPLETED | OUTPATIENT
Start: 2024-08-02 | End: 2024-08-02

## 2024-08-02 RX ORDER — CISPLATIN 1 MG/ML
45 VIAL (ML) INTRAVENOUS ONCE
Refills: 0 | Status: COMPLETED | OUTPATIENT
Start: 2024-08-02 | End: 2024-08-02

## 2024-08-02 RX ADMIN — DURVALUMAB 1500 MILLIGRAM(S): 500 INJECTION, SOLUTION INTRAVENOUS at 12:30

## 2024-08-02 RX ADMIN — FOSAPREPITANT DIMEGLUMINE 150 MILLIGRAM(S): 150 INJECTION, POWDER, LYOPHILIZED, FOR SOLUTION INTRAVENOUS at 14:33

## 2024-08-02 RX ADMIN — Medication 45 MILLIGRAM(S): at 14:47

## 2024-08-02 RX ADMIN — GEMCITABINE 1800 MILLIGRAM(S): 38 INJECTION, SOLUTION INTRAVENOUS at 15:50

## 2024-08-02 RX ADMIN — DEXTROSE MONOHYDRATE, SODIUM CHLORIDE, SODIUM LACTATE, CALCIUM CHLORIDE, MAGNESIUM CHLORIDE 1000 MILLILITER(S): 1.5; 538; 448; 18.4; 5.08 SOLUTION INTRAPERITONEAL at 18:00

## 2024-08-02 RX ADMIN — DEXTROSE MONOHYDRATE, SODIUM CHLORIDE, SODIUM LACTATE, CALCIUM CHLORIDE, MAGNESIUM CHLORIDE 507 MILLILITER(S): 1.5; 538; 448; 18.4; 5.08 SOLUTION INTRAPERITONEAL at 16:25

## 2024-08-02 RX ADMIN — Medication 45 MILLIGRAM(S): at 15:47

## 2024-08-02 RX ADMIN — PALONOSETRON HYDROCHLORIDE 0.25 MILLIGRAM(S): 0.25 INJECTION, SOLUTION INTRAVENOUS at 13:38

## 2024-08-02 RX ADMIN — Medication 1000 MILLILITER(S): at 11:45

## 2024-08-02 RX ADMIN — DEXAMETHASONE 204 MILLIGRAM(S): 1.5 TABLET ORAL at 13:38

## 2024-08-02 RX ADMIN — DURVALUMAB 1500 MILLIGRAM(S): 500 INJECTION, SOLUTION INTRAVENOUS at 13:30

## 2024-08-02 RX ADMIN — Medication 10 MILLILITER(S): at 11:45

## 2024-08-02 RX ADMIN — Medication 500 MILLILITER(S): at 12:15

## 2024-08-02 RX ADMIN — GEMCITABINE 1800 MILLIGRAM(S): 38 INJECTION, SOLUTION INTRAVENOUS at 16:20

## 2024-08-02 RX ADMIN — FOSAPREPITANT DIMEGLUMINE 500 MILLIGRAM(S): 150 INJECTION, POWDER, LYOPHILIZED, FOR SOLUTION INTRAVENOUS at 14:03

## 2024-08-02 RX ADMIN — DEXAMETHASONE 10 MILLIGRAM(S): 1.5 TABLET ORAL at 13:53

## 2024-08-05 NOTE — REVIEW OF SYSTEMS
[Fever] : no fever [Chills] : no chills [Fatigue] : fatigue [Recent Change In Weight] : ~T no recent weight change [Dizziness] : dizziness [Difficulty Walking] : difficulty walking [Negative] : Heme/Lymph [de-identified] : Dizziness and difficulty walking much improved from last visit.

## 2024-08-05 NOTE — REVIEW OF SYSTEMS
[Fever] : no fever [Chills] : no chills [Fatigue] : fatigue [Recent Change In Weight] : ~T no recent weight change [Dizziness] : dizziness [Difficulty Walking] : difficulty walking [Negative] : Heme/Lymph [de-identified] : Dizziness and difficulty walking much improved from last visit.

## 2024-08-05 NOTE — END OF VISIT
[] : Fellow [FreeTextEntry3] : I evaluated the patient with the Hematology/Oncology fellow, Dr Chacon.  Briefly, the patient is a 78 year old female who is here for follow up for unresectable intrahepatic cholangiocarcinoma.  PMH is notable for ovarian cancer s/p LUIS/BSO followed by adjuvant platinum/taxane based chemotherapy in 2007.  Oncologic history; 1.  ovarian ca - 2007- LUIS/BSO, adjuvant chemotherapy  2.  intrahepatic cholangiocarcinoma --presentation:  abdominal discomfort. --CT abdomen 3/2024:  Mass in L lobe of liver with portal vein obstruction.  Mass measures 10 x 7 x 9 cm. --AFP 27, CA 19-9 normal, CEA normal. --MRI abdomen 4/2024:  11 x 7 cm mass in the L lobe.  exophytic component exerting mass efffect on pancreas.   --saw surgical oncologist.  Unresectable disease. --Biopsy:  adenocarcinoma.  MMR intact by IHC.  CK7, CK20 positive.  negative CDX2, TTF1, Gata3, PAX8, ER.  Favor cholangiocarcinoma. 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 TMB = 5 mut/mB, MSI-H not detected. Additional mutations in ARID1A, ASXL1, MYC, RB1. --Y-90 radioembolization 5/1/24.  2nd dose on 6/10/24  Doing well at this visit.  No lingering side effects from Y 90.  ready to proceed w/ systemic therapy.  Plan: 1.  cholangiocarcinoma, intrahepatic, unresectable --history, imaging studies and biopsy results reviewed w/ the patient and her cousin at the previous visit. --recommend chest CT scan to complete staging evaluation. ordered. --Recommend proceeding with chemoimmunotherapy with gem/cis/durvalumab, as is the standard of care for 1st line treatment for advanced/unresectable cholangiocarcinoma.  Risks and side effects reviewed.  Literature provided for her to take home to review.  informed consent for treatment obtained. --supportive care meds prescribed. --pre-chemo labs including CBC, CMP, hepatitis B studies, tumor markers ordered. --mediport placement is encouraged for this treatment regimen - will arrange scheduling. --MRI abdomen report pending at the time of the visit but per verbal discussion w/ Dr May, she has had a good initial response to Y-90 with no extra-hepatic disease progression.  all questions answered to her apparent satisfaction.

## 2024-08-07 RX ORDER — DEXTROSE MONOHYDRATE, SODIUM CHLORIDE, SODIUM LACTATE, CALCIUM CHLORIDE, MAGNESIUM CHLORIDE 1.5; 538; 448; 18.4; 5.08 G/100ML; MG/100ML; MG/100ML; MG/100ML; MG/100ML
1000 SOLUTION INTRAPERITONEAL
Refills: 0 | Status: COMPLETED | OUTPATIENT
Start: 2024-08-08 | End: 2024-08-08

## 2024-08-07 RX ORDER — BACTERIOSTATIC SODIUM CHLORIDE 0.9 %
10 VIAL (ML) INJECTION ONCE
Refills: 0 | Status: COMPLETED | OUTPATIENT
Start: 2024-08-08 | End: 2024-08-08

## 2024-08-08 ENCOUNTER — OUTPATIENT (OUTPATIENT)
Dept: OUTPATIENT SERVICES | Facility: HOSPITAL | Age: 79
LOS: 1 days | End: 2024-08-08
Payer: MEDICARE

## 2024-08-08 ENCOUNTER — APPOINTMENT (OUTPATIENT)
Dept: INFUSION THERAPY | Facility: CLINIC | Age: 79
End: 2024-08-08

## 2024-08-08 ENCOUNTER — APPOINTMENT (OUTPATIENT)
Dept: HEMATOLOGY ONCOLOGY | Facility: CLINIC | Age: 79
End: 2024-08-08

## 2024-08-08 VITALS
OXYGEN SATURATION: 99 % | HEART RATE: 80 BPM | RESPIRATION RATE: 17 BRPM | SYSTOLIC BLOOD PRESSURE: 112 MMHG | DIASTOLIC BLOOD PRESSURE: 72 MMHG | TEMPERATURE: 98 F

## 2024-08-08 VITALS
RESPIRATION RATE: 18 BRPM | TEMPERATURE: 98 F | DIASTOLIC BLOOD PRESSURE: 87 MMHG | HEART RATE: 73 BPM | OXYGEN SATURATION: 98 % | HEIGHT: 68 IN | WEIGHT: 151.9 LBS | SYSTOLIC BLOOD PRESSURE: 142 MMHG

## 2024-08-08 DIAGNOSIS — C22.1 INTRAHEPATIC BILE DUCT CARCINOMA: ICD-10-CM

## 2024-08-08 PROCEDURE — 96413 CHEMO IV INFUSION 1 HR: CPT

## 2024-08-08 PROCEDURE — 96417 CHEMO IV INFUS EACH ADDL SEQ: CPT

## 2024-08-08 PROCEDURE — 99214 OFFICE O/P EST MOD 30 MIN: CPT | Mod: 25

## 2024-08-08 PROCEDURE — 36415 COLL VENOUS BLD VENIPUNCTURE: CPT

## 2024-08-08 PROCEDURE — 96367 TX/PROPH/DG ADDL SEQ IV INF: CPT

## 2024-08-08 PROCEDURE — 96375 TX/PRO/DX INJ NEW DRUG ADDON: CPT

## 2024-08-08 RX ORDER — DEXAMETHASONE 1.5 MG/1
10 TABLET ORAL ONCE
Refills: 0 | Status: COMPLETED | OUTPATIENT
Start: 2024-08-08 | End: 2024-08-08

## 2024-08-08 RX ORDER — FOSAPREPITANT DIMEGLUMINE 150 MG/5ML
150 INJECTION, POWDER, LYOPHILIZED, FOR SOLUTION INTRAVENOUS ONCE
Refills: 0 | Status: COMPLETED | OUTPATIENT
Start: 2024-08-08 | End: 2024-08-08

## 2024-08-08 RX ORDER — BACTERIOSTATIC SODIUM CHLORIDE 0.9 %
500 VIAL (ML) INJECTION ONCE
Refills: 0 | Status: COMPLETED | OUTPATIENT
Start: 2024-08-08 | End: 2024-08-08

## 2024-08-08 RX ORDER — CISPLATIN 1 MG/ML
45 VIAL (ML) INTRAVENOUS ONCE
Refills: 0 | Status: COMPLETED | OUTPATIENT
Start: 2024-08-08 | End: 2024-08-08

## 2024-08-08 RX ORDER — LIDOCAINE 5% 5 G/100G
1 CREAM TOPICAL ONCE
Refills: 0 | Status: COMPLETED | OUTPATIENT
Start: 2024-08-08 | End: 2024-08-08

## 2024-08-08 RX ORDER — GEMCITABINE 38 MG/ML
1800 INJECTION, SOLUTION INTRAVENOUS ONCE
Refills: 0 | Status: COMPLETED | OUTPATIENT
Start: 2024-08-08 | End: 2024-08-08

## 2024-08-08 RX ORDER — PALONOSETRON HYDROCHLORIDE 0.25 MG/5ML
0.25 INJECTION, SOLUTION INTRAVENOUS ONCE
Refills: 0 | Status: COMPLETED | OUTPATIENT
Start: 2024-08-08 | End: 2024-08-08

## 2024-08-08 RX ADMIN — Medication 45 MILLIGRAM(S): at 11:40

## 2024-08-08 RX ADMIN — Medication 500 MILLILITER(S): at 10:45

## 2024-08-08 RX ADMIN — PALONOSETRON HYDROCHLORIDE 0.25 MILLIGRAM(S): 0.25 INJECTION, SOLUTION INTRAVENOUS at 11:40

## 2024-08-08 RX ADMIN — Medication 10 MILLILITER(S): at 15:25

## 2024-08-08 RX ADMIN — DEXAMETHASONE 10 MILLIGRAM(S): 1.5 TABLET ORAL at 10:55

## 2024-08-08 RX ADMIN — DEXTROSE MONOHYDRATE, SODIUM CHLORIDE, SODIUM LACTATE, CALCIUM CHLORIDE, MAGNESIUM CHLORIDE 1000 MILLILITER(S): 1.5; 538; 448; 18.4; 5.08 SOLUTION INTRAPERITONEAL at 15:20

## 2024-08-08 RX ADMIN — DEXAMETHASONE 204 MILLIGRAM(S): 1.5 TABLET ORAL at 10:40

## 2024-08-08 RX ADMIN — FOSAPREPITANT DIMEGLUMINE 150 MILLIGRAM(S): 150 INJECTION, POWDER, LYOPHILIZED, FOR SOLUTION INTRAVENOUS at 11:35

## 2024-08-08 RX ADMIN — LIDOCAINE 5% 1 APPLICATION(S): 5 CREAM TOPICAL at 09:00

## 2024-08-08 RX ADMIN — FOSAPREPITANT DIMEGLUMINE 500 MILLIGRAM(S): 150 INJECTION, POWDER, LYOPHILIZED, FOR SOLUTION INTRAVENOUS at 11:01

## 2024-08-08 RX ADMIN — GEMCITABINE 1800 MILLIGRAM(S): 38 INJECTION, SOLUTION INTRAVENOUS at 11:41

## 2024-08-08 RX ADMIN — Medication 45 MILLIGRAM(S): at 13:20

## 2024-08-08 RX ADMIN — Medication 1000 MILLILITER(S): at 10:10

## 2024-08-08 RX ADMIN — DEXTROSE MONOHYDRATE, SODIUM CHLORIDE, SODIUM LACTATE, CALCIUM CHLORIDE, MAGNESIUM CHLORIDE 507 MILLILITER(S): 1.5; 538; 448; 18.4; 5.08 SOLUTION INTRAPERITONEAL at 13:20

## 2024-08-08 RX ADMIN — GEMCITABINE 1800 MILLIGRAM(S): 38 INJECTION, SOLUTION INTRAVENOUS at 12:00

## 2024-08-08 NOTE — HISTORY OF PRESENT ILLNESS
[de-identified] : Rasheeda Cornejo is a 78 year old female who is here for follow up for unresectable intrahepatic cholangiocarcinoma.   Oncologic History: 1. ovarian ca - 2007- LUIS/BSO, adjuvant chemotherapy  2. intrahepatic cholangiocarcinoma --presentation: abdominal discomfort. --CT abdomen 3/2024: Mass in L lobe of liver with portal vein obstruction. Mass measures 10 x 7 x 9 cm. --AFP 27, CA 19-9 normal, CEA normal. --MRI abdomen 4/2024: 11 x 7 cm mass in the L lobe. exophytic component exerting mass efffect on pancreas. --saw surgical oncologist. Unresectable disease. --Biopsy: adenocarcinoma. MMR intact by IHC. CK7, CK20 positive. negative CDX2, TTF1, Gata3, PAX8, ER. Favor cholangiocarcinoma. 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 TMB = 5 mut/mB, MSI-H not detected. Additional mutations in ARID1A, ASXL1, MYC, RB1.  --Y-90 radioembolization 5/1/24. 2nd dose on 6/10/24 --pt declined germline genetic testing for BAP1 mutation --7/15/24 MR Abdomen: Since April 3, 2024, significantly decreased enhancement and slightly decreased size of intrahepatic cholangiocarcinoma in left lobe status post Y-90. --7/24/24: CT Chest: No definite evidence of metastatic cholangiocarcinoma in the chest. A few tiny lung nodules have nonspecific appearances.  --initiated chemoimmunotherapy with gemcitabine/cisplatin/durvalumab on 8/2/24  PMH is notable for ovarian cancer s/p LUIS/BSO followed by adjuvant platinum/taxane based chemotherapy in 2007. Vestibular neuritis FH:  Sister leukemia. Mother breast cancer. Father lymphoma. Paternal grandfather unspecified GI malignancy. Does not have children or other first degree relatives. 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  no children. [de-identified] : Here for cycle 1 day 8 cisplatin/gemcitabine.  she tolerated cycle 1 chemotherapy well. No immune toxicities noted at this visit.  eating and BM are ok, weight stable.  fatigue, stable and manageable. Symptoms of vestibular neuritis - blurry vision, vertigo, headache - slightly worse after the chemo last week but now improved.   she had hearing test in May, which shows " normal and pass" overall she feels ok, denies nausea/ vomiting, constipation, diarrhea, skin rash/itching, hearing loss, fever, cough, or any sites of pain.  she asked if she could get flu and covid vaccine when available.  Not recommend at this time.

## 2024-08-08 NOTE — HISTORY OF PRESENT ILLNESS
[de-identified] : Rasheeda Cornejo is a 78 year old female who is here for follow up for unresectable intrahepatic cholangiocarcinoma.   Oncologic History: 1. ovarian ca - 2007- LUIS/BSO, adjuvant chemotherapy  2. intrahepatic cholangiocarcinoma --presentation: abdominal discomfort. --CT abdomen 3/2024: Mass in L lobe of liver with portal vein obstruction. Mass measures 10 x 7 x 9 cm. --AFP 27, CA 19-9 normal, CEA normal. --MRI abdomen 4/2024: 11 x 7 cm mass in the L lobe. exophytic component exerting mass efffect on pancreas. --saw surgical oncologist. Unresectable disease. --Biopsy: adenocarcinoma. MMR intact by IHC. CK7, CK20 positive. negative CDX2, TTF1, Gata3, PAX8, ER. Favor cholangiocarcinoma. 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 TMB = 5 mut/mB, MSI-H not detected. Additional mutations in ARID1A, ASXL1, MYC, RB1.  --Y-90 radioembolization 5/1/24. 2nd dose on 6/10/24 --pt declined germline genetic testing for BAP1 mutation --7/15/24 MR Abdomen: Since April 3, 2024, significantly decreased enhancement and slightly decreased size of intrahepatic cholangiocarcinoma in left lobe status post Y-90. --7/24/24: CT Chest: No definite evidence of metastatic cholangiocarcinoma in the chest. A few tiny lung nodules have nonspecific appearances.  --initiated chemoimmunotherapy with gemcitabine/cisplatin/durvalumab on 8/2/24  PMH is notable for ovarian cancer s/p LUIS/BSO followed by adjuvant platinum/taxane based chemotherapy in 2007. Vestibular neuritis FH:  Sister leukemia. Mother breast cancer. Father lymphoma. Paternal grandfather unspecified GI malignancy. Does not have children or other first degree relatives. 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  no children. [de-identified] : Here for cycle 1 day 8 cisplatin/gemcitabine.  she tolerated cycle 1 chemotherapy well. No immune toxicities noted at this visit.  eating and BM are ok, weight stable.  fatigue, stable and manageable. Symptoms of vestibular neuritis - blurry vision, vertigo, headache - slightly worse after the chemo last week but now improved.   she had hearing test in May, which shows " normal and pass" overall she feels ok, denies nausea/ vomiting, constipation, diarrhea, skin rash/itching, hearing loss, fever, cough, or any sites of pain.  she asked if she could get flu and covid vaccine when available.  Not recommend at this time.

## 2024-08-08 NOTE — ASSESSMENT
[FreeTextEntry1] : 78 F with PMHx of HTN, HLD asthma, ovarian cancer, presents for follow up of unresectable intrahepatic cholangiocarcinoma.  initiated chemoimmunotherapy with gemcitabine/cisplatin/durvalumab on 8/2/24  # Unresectable intrahepatic cholangiocarcinoma --reviewed available labs, imaging, pathology, diagnosis, and treatment with patient and cousin on initial visit. Overall, clinical picture concerning for advanced intrahepatic cholangiocarcinoma, unresectable due to obstruction of the portal vein. --obtained chest CT to complete staging evaluation on 7/24/24, showing no definite evidence of metastatic cholangiocarcinoma in the chest. --recommend proceeding with chemoimmunotherapy with gemcitabine/cisplatin/durvalumab, as is the standard of care for 1st line treatment for advanced/unresectable cholangiocarcinoma.  --hepatitis B studies and CA 19-9  normal, AFP 12 on 7/17 from 27 in 4/2024, follow  --initiated chemoimmunotherapy with gemcitabine/cisplatin/durvalumab on 8/2/24, tolerated the first treatment well with manageable side effects. No immune related toxicities at this visit. Labs reviewed and will proceed with cycle 1 day 8 of cisplatin/gemcitabine today.  --MRI abdomen on 7/14/24 showed significantly decreased enhancement and slightly decreased size of intrahepatic cholangiocarcinoma in left lobe status post Y-90.  --supportive care meds including zofran for nausea/vomiting and loperamide for diarrhea as prn. --plan for 4 cycles every 3 weeks followed by repeat imaging.   # vestibular neuritis --symptoms were slightly worse right after the chemo, then improved.  --hearing test in May 2024 was normal(scanned) --f/u with ENT, Dr. Vogel   C2D1 gemcitabine/cisplatin/durvalumab on 8/22, she will have labs done at [facility] prior to chemo, rx provided.  RTC for C2D8 on 8/29 labs with CBC, CMP, Mag, AFP-TM

## 2024-08-08 NOTE — PHYSICAL EXAM
[Restricted in physically strenuous activity but ambulatory and able to carry out work of a light or sedentary nature] : Status 1- Restricted in physically strenuous activity but ambulatory and able to carry out work of a light or sedentary nature, e.g., light house work, office work [Normal] : supple without JVD, no thyromegaly or masses appreciated [de-identified] : No LE edema, right  port in place. No swelling or tender,  mild erythema at upper incision site but no open wound or discharge.  [de-identified] : soft, non tender, no rebound or guarding

## 2024-08-08 NOTE — ASSESSMENT
[FreeTextEntry1] : 78 F with PMHx of HTN, HLD asthma, ovarian cancer, presents for follow up of unresectable intrahepatic cholangiocarcinoma.  initiated chemoimmunotherapy with gemcitabine/cisplatin/durvalumab on 8/2/24  # Unresectable intrahepatic cholangiocarcinoma --reviewed available labs, imaging, pathology, diagnosis, and treatment with patient and cousin on initial visit. Overall, clinical picture concerning for advanced intrahepatic cholangiocarcinoma, unresectable due to obstruction of the portal vein. --obtained chest CT to complete staging evaluation on 7/24/24, showing no definite evidence of metastatic cholangiocarcinoma in the chest. --recommend proceeding with chemoimmunotherapy with gemcitabine/cisplatin/durvalumab, as is the standard of care for 1st line treatment for advanced/unresectable cholangiocarcinoma.  --hepatitis B studies and CA 19-9  normal, AFP 12 on 7/17 from 27 in 4/2024, follow  --initiated chemoimmunotherapy with gemcitabine/cisplatin/durvalumab on 8/2/24, tolerated the first treatment well with manageable side effects. No immune related toxicities at this visit. Labs reviewed and will proceed with cycle 1 day 8 of cisplatin/gemcitabine today.  --MRI abdomen on 7/14/24 showed significantly decreased enhancement and slightly decreased size of intrahepatic cholangiocarcinoma in left lobe status post Y-90.  --supportive care meds including zofran for nausea/vomiting and loperamide for diarrhea as prn. --plan for 4 cycles every 3 weeks followed by repeat imaging.   # vestibular neuritis --symptoms were slightly worse right after the chemo, then improved.  --hearing test in May 2024 was normal(scanned) --f/u with ENT, Dr. Vogel   C2D1 gemcitabine/cisplatin/durvalumab on 8/22, she will have labs done at [facility] prior to chemo, rx provided.  RTC for C2D8 on 8/29 labs with CBC, CMP, Mag, AFP-TM

## 2024-08-08 NOTE — PHYSICAL EXAM
[Restricted in physically strenuous activity but ambulatory and able to carry out work of a light or sedentary nature] : Status 1- Restricted in physically strenuous activity but ambulatory and able to carry out work of a light or sedentary nature, e.g., light house work, office work [Normal] : supple without JVD, no thyromegaly or masses appreciated [de-identified] : No LE edema, right  port in place. No swelling or tender,  mild erythema at upper incision site but no open wound or discharge.  [de-identified] : soft, non tender, no rebound or guarding

## 2024-08-20 ENCOUNTER — LABORATORY RESULT (OUTPATIENT)
Age: 79
End: 2024-08-20

## 2024-08-22 ENCOUNTER — APPOINTMENT (OUTPATIENT)
Dept: INFUSION THERAPY | Facility: CLINIC | Age: 79
End: 2024-08-22

## 2024-08-27 RX ORDER — SODIUM CHLORIDE 9 MG/ML
10 INJECTION INTRAMUSCULAR; INTRAVENOUS; SUBCUTANEOUS ONCE
Refills: 0 | Status: COMPLETED | OUTPATIENT
Start: 2024-08-29 | End: 2024-08-29

## 2024-08-29 ENCOUNTER — OUTPATIENT (OUTPATIENT)
Dept: OUTPATIENT SERVICES | Facility: HOSPITAL | Age: 79
LOS: 1 days | End: 2024-08-29
Payer: MEDICARE

## 2024-08-29 ENCOUNTER — APPOINTMENT (OUTPATIENT)
Dept: HEMATOLOGY ONCOLOGY | Facility: CLINIC | Age: 79
End: 2024-08-29
Payer: MEDICARE

## 2024-08-29 ENCOUNTER — APPOINTMENT (OUTPATIENT)
Dept: INFUSION THERAPY | Facility: CLINIC | Age: 79
End: 2024-08-29

## 2024-08-29 VITALS
TEMPERATURE: 98 F | OXYGEN SATURATION: 98 % | DIASTOLIC BLOOD PRESSURE: 76 MMHG | SYSTOLIC BLOOD PRESSURE: 128 MMHG | RESPIRATION RATE: 15 BRPM | HEART RATE: 72 BPM

## 2024-08-29 VITALS
WEIGHT: 154.98 LBS | TEMPERATURE: 98 F | SYSTOLIC BLOOD PRESSURE: 138 MMHG | DIASTOLIC BLOOD PRESSURE: 70 MMHG | HEART RATE: 76 BPM | HEIGHT: 68 IN | RESPIRATION RATE: 16 BRPM | OXYGEN SATURATION: 99 %

## 2024-08-29 VITALS
SYSTOLIC BLOOD PRESSURE: 138 MMHG | HEART RATE: 75 BPM | OXYGEN SATURATION: 99 % | DIASTOLIC BLOOD PRESSURE: 61 MMHG | TEMPERATURE: 97.9 F | BODY MASS INDEX: 23.49 KG/M2 | HEIGHT: 68 IN | RESPIRATION RATE: 18 BRPM | WEIGHT: 155 LBS

## 2024-08-29 DIAGNOSIS — C22.1 INTRAHEPATIC BILE DUCT CARCINOMA: ICD-10-CM

## 2024-08-29 LAB
AFP-TM SERPL-MCNC: 11.4 NG/ML
ALBUMIN SERPL ELPH-MCNC: 3.8 G/DL
ALP BLD-CCNC: 165 U/L
ALT SERPL-CCNC: 19 U/L
ANION GAP SERPL CALC-SCNC: 2 MMOL/L
AST SERPL-CCNC: 28 U/L
BILIRUB SERPL-MCNC: 0.6 MG/DL
BUN SERPL-MCNC: 14 MG/DL
CALCIUM SERPL-MCNC: 10.3 MG/DL
CHLORIDE SERPL-SCNC: 106 MMOL/L
CO2 SERPL-SCNC: 29 MMOL/L
CREAT SERPL-MCNC: 0.8 MG/DL
EGFR: 76 ML/MIN/1.73M2
GLUCOSE SERPL-MCNC: 117 MG/DL
HCT VFR BLD CALC: 33.2 %
HGB BLD-MCNC: 11 G/DL
LYMPHOCYTES # BLD AUTO: 0.7 K/UL
LYMPHOCYTES NFR BLD AUTO: 22.4 %
MAGNESIUM SERPL-MCNC: 1.9 MG/DL
MAN DIFF?: NO
MCHC RBC-ENTMCNC: 30.6 PG
MCHC RBC-ENTMCNC: 33.1 GM/DL
MCV RBC AUTO: 92.2 FL
NEUTROPHILS # BLD AUTO: 2.3 K/UL
NEUTROPHILS NFR BLD AUTO: 69.7 %
PLATELET # BLD AUTO: 267 K/UL
POTASSIUM SERPL-SCNC: 4.9 MMOL/L
PROT SERPL-MCNC: 7.1 G/DL
RBC # BLD: 3.6 M/UL
RBC # FLD: 13.4 %
SODIUM SERPL-SCNC: 137 MMOL/L
WBC # FLD AUTO: 3.3 K/UL

## 2024-08-29 PROCEDURE — 96375 TX/PRO/DX INJ NEW DRUG ADDON: CPT

## 2024-08-29 PROCEDURE — 96417 CHEMO IV INFUS EACH ADDL SEQ: CPT

## 2024-08-29 PROCEDURE — 99214 OFFICE O/P EST MOD 30 MIN: CPT | Mod: 25

## 2024-08-29 PROCEDURE — 36415 COLL VENOUS BLD VENIPUNCTURE: CPT

## 2024-08-29 PROCEDURE — 96413 CHEMO IV INFUSION 1 HR: CPT

## 2024-08-29 PROCEDURE — 96367 TX/PROPH/DG ADDL SEQ IV INF: CPT

## 2024-08-29 RX ORDER — PALONOSETRON HYDROCHLORIDE 0.25 MG/5ML
0.25 INJECTION INTRAVENOUS ONCE
Refills: 0 | Status: COMPLETED | OUTPATIENT
Start: 2024-08-29 | End: 2024-08-29

## 2024-08-29 RX ORDER — CISPLATIN 1 MG/ML
45 INJECTION, SOLUTION INTRAVENOUS ONCE
Refills: 0 | Status: COMPLETED | OUTPATIENT
Start: 2024-08-29 | End: 2024-08-29

## 2024-08-29 RX ORDER — GEMCITABINE 10 MG/ML
1800 INJECTION, SOLUTION INTRAVENOUS ONCE
Refills: 0 | Status: COMPLETED | OUTPATIENT
Start: 2024-08-29 | End: 2024-08-29

## 2024-08-29 RX ORDER — FOSAPREPITANT DIMEGLUMINE 150 MG/5ML
150 INJECTION, POWDER, LYOPHILIZED, FOR SOLUTION INTRAVENOUS ONCE
Refills: 0 | Status: COMPLETED | OUTPATIENT
Start: 2024-08-29 | End: 2024-08-29

## 2024-08-29 RX ORDER — LIDOCAINE/BENZALKONIUM/ALCOHOL
1 SOLUTION, NON-ORAL TOPICAL ONCE
Refills: 0 | Status: COMPLETED | OUTPATIENT
Start: 2024-08-29 | End: 2024-08-29

## 2024-08-29 RX ORDER — SODIUM CHLORIDE 9 MG/ML
500 INJECTION INTRAMUSCULAR; INTRAVENOUS; SUBCUTANEOUS ONCE
Refills: 0 | Status: COMPLETED | OUTPATIENT
Start: 2024-08-29 | End: 2024-08-29

## 2024-08-29 RX ORDER — DEXAMETHASONE 0.75 MG
10 TABLET ORAL ONCE
Refills: 0 | Status: COMPLETED | OUTPATIENT
Start: 2024-08-29 | End: 2024-08-29

## 2024-08-29 RX ADMIN — SODIUM CHLORIDE 10 MILLILITER(S): 9 INJECTION INTRAMUSCULAR; INTRAVENOUS; SUBCUTANEOUS at 16:16

## 2024-08-29 RX ADMIN — Medication 1000 MILLILITER(S): at 16:15

## 2024-08-29 RX ADMIN — Medication 507 MILLILITER(S): at 14:52

## 2024-08-29 RX ADMIN — GEMCITABINE 1800 MILLIGRAM(S): 10 INJECTION, SOLUTION INTRAVENOUS at 14:25

## 2024-08-29 RX ADMIN — SODIUM CHLORIDE 500 MILLILITER(S): 9 INJECTION INTRAMUSCULAR; INTRAVENOUS; SUBCUTANEOUS at 01:22

## 2024-08-29 RX ADMIN — Medication 10 MILLIGRAM(S): at 12:40

## 2024-08-29 RX ADMIN — GEMCITABINE 1800 MILLIGRAM(S): 10 INJECTION, SOLUTION INTRAVENOUS at 13:55

## 2024-08-29 RX ADMIN — FOSAPREPITANT DIMEGLUMINE 500 MILLIGRAM(S): 150 INJECTION, POWDER, LYOPHILIZED, FOR SOLUTION INTRAVENOUS at 11:50

## 2024-08-29 RX ADMIN — PALONOSETRON HYDROCHLORIDE 0.25 MILLIGRAM(S): 0.25 INJECTION INTRAVENOUS at 12:29

## 2024-08-29 RX ADMIN — SODIUM CHLORIDE 1000 MILLILITER(S): 9 INJECTION INTRAMUSCULAR; INTRAVENOUS; SUBCUTANEOUS at 11:50

## 2024-08-29 RX ADMIN — CISPLATIN 45 MILLIGRAM(S): 1 INJECTION, SOLUTION INTRAVENOUS at 13:50

## 2024-08-29 RX ADMIN — CISPLATIN 45 MILLIGRAM(S): 1 INJECTION, SOLUTION INTRAVENOUS at 12:51

## 2024-08-29 RX ADMIN — FOSAPREPITANT DIMEGLUMINE 150 MILLIGRAM(S): 150 INJECTION, POWDER, LYOPHILIZED, FOR SOLUTION INTRAVENOUS at 12:25

## 2024-08-29 RX ADMIN — Medication 204 MILLIGRAM(S): at 12:21

## 2024-08-29 NOTE — ASSESSMENT
[FreeTextEntry1] : 78 F with PMHx of HTN, HLD asthma, ovarian cancer, presents for follow up of unresectable intrahepatic cholangiocarcinoma.  s/p Y-90 radioembolization in May and June 2024, followed by chemoimmunotherapy with gemcitabine/cisplatin/durvalumab on 8/2/24  # Unresectable intrahepatic cholangiocarcinoma --reviewed available labs, imaging, pathology, diagnosis, and treatment with patient and cousin on initial visit. Overall, clinical picture concerning for advanced intrahepatic cholangiocarcinoma, unresectable due to obstruction of the portal vein. --tolerating chemoimmunotherapy with mild, manageable side effects. No immune related toxicities at this visit. CBC, CMP reviewed and will proceed with cycle 2 day 8 of cisplatin/gemcitabine today.  --next imaging after C4 of treatment with MRI abdomen --follow AFP.  Curious that CEA and CA 19-9 are normal and that this is the abnormal marker.  # vestibular neuritis --symptoms were slightly worse right after the chemo, then improved.  --hearing test in May 2024 was normal(scanned) --continue vestibular PT --f/u with ENT, Dr. Vogel   RTC for C3D1 with gemcitabine/cisplatin/durvalumab on 9/12 labs with CBC, CMP, Mag

## 2024-08-29 NOTE — REVIEW OF SYSTEMS
[Diarrhea: Grade 0] : Diarrhea: Grade 0 [Negative] : Heme/Lymph [Fever] : no fever [Abdominal Pain] : no abdominal pain [de-identified] : intermittent episodes of dizziness attributed to vestibular neuritits

## 2024-08-29 NOTE — PHYSICAL EXAM
[Restricted in physically strenuous activity but ambulatory and able to carry out work of a light or sedentary nature] : Status 1- Restricted in physically strenuous activity but ambulatory and able to carry out work of a light or sedentary nature, e.g., light house work, office work [Normal] : affect appropriate [de-identified] : No LE edema, right  port in place. No swelling or tender,  mild erythema at upper incision site but no open wound or discharge.  [de-identified] : soft, non tender, no rebound or guarding

## 2024-08-29 NOTE — PHYSICAL EXAM
[Restricted in physically strenuous activity but ambulatory and able to carry out work of a light or sedentary nature] : Status 1- Restricted in physically strenuous activity but ambulatory and able to carry out work of a light or sedentary nature, e.g., light house work, office work [Normal] : affect appropriate [de-identified] : No LE edema, right  port in place. No swelling or tender,  mild erythema at upper incision site but no open wound or discharge.  [de-identified] : soft, non tender, no rebound or guarding

## 2024-08-29 NOTE — HISTORY OF PRESENT ILLNESS
[de-identified] : Rasheeda Cornejo is a 78 year old female who is here for follow up for unresectable intrahepatic cholangiocarcinoma.   Oncologic History: 1. ovarian ca - 2007- LUIS/BSO, adjuvant chemotherapy  2. intrahepatic cholangiocarcinoma --presentation: abdominal discomfort. --CT abdomen 3/2024: Mass in L lobe of liver with portal vein obstruction. Mass measures 10 x 7 x 9 cm. --AFP 27, CA 19-9 normal, CEA normal. --MRI abdomen 4/2024: 11 x 7 cm mass in the L lobe. exophytic component exerting mass efffect on pancreas. --saw surgical oncologist. Unresectable disease. --Biopsy: adenocarcinoma. MMR intact by IHC. CK7, CK20 positive. negative CDX2, TTF1, Gata3, PAX8, ER. Favor cholangiocarcinoma. 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 TMB = 5 mut/mB, MSI-H not detected. Additional mutations in ARID1A, ASXL1, MYC, RB1.  --Y-90 radioembolization 5/1/24. 2nd dose on 6/10/24 --pt declined germline genetic testing for BAP1 mutation --7/15/24 MR Abdomen: Since April 3, 2024, significantly decreased enhancement and slightly decreased size of intrahepatic cholangiocarcinoma in left lobe status post Y-90. --initiated chemoimmunotherapy with gemcitabine/cisplatin/durvalumab on 8/2/24  PMH is notable for ovarian cancer s/p LUIS/BSO followed by adjuvant platinum/taxane based chemotherapy in 2007. Vestibular neuritis FH:  Sister leukemia. Mother breast cancer. Father lymphoma. Paternal grandfather unspecified GI malignancy. Does not have children or other first degree relatives. 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  no children. [de-identified] : Here for cycle 2 day 8 cisplatin/gemcitabine. She tolerated cycle 1 chemotherapy well. No immune toxicities noted at this visit. Energy level, appetite, and taste are stable. Slight heartburn, improved with tums. Denies CP, SOB, diarrhea, N, V, dysuria, rash, hearing changes.  Continues to have some issues related to vestibular neuritis including blurry vision intermittently, headaches requiring advil, and dizziness. Rolled ankle with PT, swollen but improving. Vestibular problems much improved from original diagnosis before treatment, but slightly worse in the past few weeks. Continues with vestibular PT and building strength.

## 2024-08-29 NOTE — END OF VISIT
[] : Fellow [FreeTextEntry3] :  I evaluated the patient with the Oncology fellow, Dr Alan.  the patient presents for pre-treatment evaluation on C2, day 8 of chemoimmunotherapy with gem/cis/durvalumab.  she is doing very well on treatment without any dose limiting toxicities.  CBC and CMP reviewed - acceptable for treatment. Proceed w/ chemotherapy today.  RTC in 2 weeks for C3, day 1.  rest of plan of care as above.

## 2024-08-29 NOTE — HISTORY OF PRESENT ILLNESS
[de-identified] : Rasheeda Cornejo is a 78 year old female who is here for follow up for unresectable intrahepatic cholangiocarcinoma.   Oncologic History: 1. ovarian ca - 2007- LUIS/BSO, adjuvant chemotherapy  2. intrahepatic cholangiocarcinoma --presentation: abdominal discomfort. --CT abdomen 3/2024: Mass in L lobe of liver with portal vein obstruction. Mass measures 10 x 7 x 9 cm. --AFP 27, CA 19-9 normal, CEA normal. --MRI abdomen 4/2024: 11 x 7 cm mass in the L lobe. exophytic component exerting mass efffect on pancreas. --saw surgical oncologist. Unresectable disease. --Biopsy: adenocarcinoma. MMR intact by IHC. CK7, CK20 positive. negative CDX2, TTF1, Gata3, PAX8, ER. Favor cholangiocarcinoma. 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 TMB = 5 mut/mB, MSI-H not detected. Additional mutations in ARID1A, ASXL1, MYC, RB1.  --Y-90 radioembolization 5/1/24. 2nd dose on 6/10/24 --pt declined germline genetic testing for BAP1 mutation --7/15/24 MR Abdomen: Since April 3, 2024, significantly decreased enhancement and slightly decreased size of intrahepatic cholangiocarcinoma in left lobe status post Y-90. --initiated chemoimmunotherapy with gemcitabine/cisplatin/durvalumab on 8/2/24  PMH is notable for ovarian cancer s/p LUIS/BSO followed by adjuvant platinum/taxane based chemotherapy in 2007. Vestibular neuritis FH:  Sister leukemia. Mother breast cancer. Father lymphoma. Paternal grandfather unspecified GI malignancy. Does not have children or other first degree relatives. 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  no children. [de-identified] : Here for cycle 2 day 8 cisplatin/gemcitabine. She tolerated cycle 1 chemotherapy well. No immune toxicities noted at this visit. Energy level, appetite, and taste are stable. Slight heartburn, improved with tums. Denies CP, SOB, diarrhea, N, V, dysuria, rash, hearing changes.  Continues to have some issues related to vestibular neuritis including blurry vision intermittently, headaches requiring advil, and dizziness. Rolled ankle with PT, swollen but improving. Vestibular problems much improved from original diagnosis before treatment, but slightly worse in the past few weeks. Continues with vestibular PT and building strength.

## 2024-08-29 NOTE — HISTORY OF PRESENT ILLNESS
[de-identified] : Rasheeda Cornejo is a 78 year old female who is here for follow up for unresectable intrahepatic cholangiocarcinoma.   Oncologic History: 1. ovarian ca - 2007- LUIS/BSO, adjuvant chemotherapy  2. intrahepatic cholangiocarcinoma --presentation: abdominal discomfort. --CT abdomen 3/2024: Mass in L lobe of liver with portal vein obstruction. Mass measures 10 x 7 x 9 cm. --AFP 27, CA 19-9 normal, CEA normal. --MRI abdomen 4/2024: 11 x 7 cm mass in the L lobe. exophytic component exerting mass efffect on pancreas. --saw surgical oncologist. Unresectable disease. --Biopsy: adenocarcinoma. MMR intact by IHC. CK7, CK20 positive. negative CDX2, TTF1, Gata3, PAX8, ER. Favor cholangiocarcinoma. 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 TMB = 5 mut/mB, MSI-H not detected. Additional mutations in ARID1A, ASXL1, MYC, RB1.  --Y-90 radioembolization 5/1/24. 2nd dose on 6/10/24 --pt declined germline genetic testing for BAP1 mutation --7/15/24 MR Abdomen: Since April 3, 2024, significantly decreased enhancement and slightly decreased size of intrahepatic cholangiocarcinoma in left lobe status post Y-90. --initiated chemoimmunotherapy with gemcitabine/cisplatin/durvalumab on 8/2/24  PMH is notable for ovarian cancer s/p LUIS/BSO followed by adjuvant platinum/taxane based chemotherapy in 2007. Vestibular neuritis FH:  Sister leukemia. Mother breast cancer. Father lymphoma. Paternal grandfather unspecified GI malignancy. Does not have children or other first degree relatives. 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  no children. [de-identified] : Here for cycle 2 day 8 cisplatin/gemcitabine. She tolerated cycle 1 chemotherapy well. No immune toxicities noted at this visit. Energy level, appetite, and taste are stable. Slight heartburn, improved with tums. Denies CP, SOB, diarrhea, N, V, dysuria, rash, hearing changes.  Continues to have some issues related to vestibular neuritis including blurry vision intermittently, headaches requiring advil, and dizziness. Rolled ankle with PT, swollen but improving. Vestibular problems much improved from original diagnosis before treatment, but slightly worse in the past few weeks. Continues with vestibular PT and building strength.

## 2024-08-29 NOTE — REVIEW OF SYSTEMS
[Diarrhea: Grade 0] : Diarrhea: Grade 0 [Negative] : Heme/Lymph [Fever] : no fever [Abdominal Pain] : no abdominal pain [de-identified] : intermittent episodes of dizziness attributed to vestibular neuritits

## 2024-08-29 NOTE — REVIEW OF SYSTEMS
[Diarrhea: Grade 0] : Diarrhea: Grade 0 [Negative] : Heme/Lymph [Fever] : no fever [Abdominal Pain] : no abdominal pain [de-identified] : intermittent episodes of dizziness attributed to vestibular neuritits

## 2024-08-29 NOTE — PHYSICAL EXAM
[Restricted in physically strenuous activity but ambulatory and able to carry out work of a light or sedentary nature] : Status 1- Restricted in physically strenuous activity but ambulatory and able to carry out work of a light or sedentary nature, e.g., light house work, office work [Normal] : affect appropriate [de-identified] : No LE edema, right  port in place. No swelling or tender,  mild erythema at upper incision site but no open wound or discharge.  [de-identified] : soft, non tender, no rebound or guarding

## 2024-08-30 DIAGNOSIS — C22.1 INTRAHEPATIC BILE DUCT CARCINOMA: ICD-10-CM

## 2024-09-10 RX ORDER — SODIUM CHLORIDE 9 MG/ML
10 INJECTION INTRAMUSCULAR; INTRAVENOUS; SUBCUTANEOUS ONCE
Refills: 0 | Status: COMPLETED | OUTPATIENT
Start: 2024-09-12 | End: 2024-09-12

## 2024-09-10 RX ORDER — LIDOCAINE/BENZALKONIUM/ALCOHOL
1 SOLUTION, NON-ORAL TOPICAL ONCE
Refills: 0 | Status: COMPLETED | OUTPATIENT
Start: 2024-09-12 | End: 2024-09-12

## 2024-09-10 RX ORDER — SODIUM CHLORIDE 9 MG/ML
500 INJECTION INTRAMUSCULAR; INTRAVENOUS; SUBCUTANEOUS ONCE
Refills: 0 | Status: COMPLETED | OUTPATIENT
Start: 2024-09-12 | End: 2024-09-12

## 2024-09-12 ENCOUNTER — APPOINTMENT (OUTPATIENT)
Dept: HEMATOLOGY ONCOLOGY | Facility: CLINIC | Age: 79
End: 2024-09-12
Payer: MEDICARE

## 2024-09-12 ENCOUNTER — APPOINTMENT (OUTPATIENT)
Dept: INFUSION THERAPY | Facility: CLINIC | Age: 79
End: 2024-09-12

## 2024-09-12 ENCOUNTER — NON-APPOINTMENT (OUTPATIENT)
Age: 79
End: 2024-09-12

## 2024-09-12 ENCOUNTER — OUTPATIENT (OUTPATIENT)
Dept: OUTPATIENT SERVICES | Facility: HOSPITAL | Age: 79
LOS: 1 days | End: 2024-09-12
Payer: MEDICARE

## 2024-09-12 VITALS
WEIGHT: 157 LBS | OXYGEN SATURATION: 98 % | HEIGHT: 68 IN | SYSTOLIC BLOOD PRESSURE: 144 MMHG | RESPIRATION RATE: 18 BRPM | TEMPERATURE: 98.5 F | DIASTOLIC BLOOD PRESSURE: 73 MMHG | BODY MASS INDEX: 23.79 KG/M2 | HEART RATE: 84 BPM

## 2024-09-12 VITALS
HEIGHT: 68 IN | OXYGEN SATURATION: 99 % | TEMPERATURE: 98 F | DIASTOLIC BLOOD PRESSURE: 73 MMHG | SYSTOLIC BLOOD PRESSURE: 144 MMHG | HEART RATE: 80 BPM | RESPIRATION RATE: 18 BRPM | WEIGHT: 156.97 LBS

## 2024-09-12 DIAGNOSIS — C22.1 INTRAHEPATIC BILE DUCT CARCINOMA: ICD-10-CM

## 2024-09-12 DIAGNOSIS — C22.0 LIVER CELL CARCINOMA: ICD-10-CM

## 2024-09-12 LAB
ALBUMIN SERPL ELPH-MCNC: 3.3 G/DL
ALP BLD-CCNC: 150 U/L
ALT SERPL-CCNC: 16 U/L
ANION GAP SERPL CALC-SCNC: -1 MMOL/L
AST SERPL-CCNC: 28 U/L
BILIRUB SERPL-MCNC: 0.5 MG/DL
BUN SERPL-MCNC: 17 MG/DL
CALCIUM SERPL-MCNC: 9.7 MG/DL
CHLORIDE SERPL-SCNC: 111 MMOL/L
CO2 SERPL-SCNC: 26 MMOL/L
CREAT SERPL-MCNC: 0.7 MG/DL
EGFR: 90 ML/MIN/1.73M2
GLUCOSE SERPL-MCNC: 100 MG/DL
HCT VFR BLD CALC: 31.5 %
HGB BLD-MCNC: 10.6 G/DL
LYMPHOCYTES # BLD AUTO: 0.7 K/UL
LYMPHOCYTES NFR BLD AUTO: 10.5 %
MAGNESIUM SERPL-MCNC: 1.9 MG/DL
MAN DIFF?: NO
MCHC RBC-ENTMCNC: 31.5 PG
MCHC RBC-ENTMCNC: 33.7 GM/DL
MCV RBC AUTO: 93.8 FL
NEUTROPHILS # BLD AUTO: 4.8 K/UL
NEUTROPHILS NFR BLD AUTO: 76.1 %
PLATELET # BLD AUTO: 220 K/UL
POTASSIUM SERPL-SCNC: 4.8 MMOL/L
PROT SERPL-MCNC: 6.4 G/DL
RBC # BLD: 3.36 M/UL
RBC # FLD: 15.5 %
SODIUM SERPL-SCNC: 136 MMOL/L
WBC # FLD AUTO: 6.4 K/UL

## 2024-09-12 PROCEDURE — 99214 OFFICE O/P EST MOD 30 MIN: CPT | Mod: 25

## 2024-09-12 PROCEDURE — 96413 CHEMO IV INFUSION 1 HR: CPT

## 2024-09-12 PROCEDURE — 96367 TX/PROPH/DG ADDL SEQ IV INF: CPT

## 2024-09-12 PROCEDURE — 96417 CHEMO IV INFUS EACH ADDL SEQ: CPT

## 2024-09-12 PROCEDURE — 96361 HYDRATE IV INFUSION ADD-ON: CPT

## 2024-09-12 PROCEDURE — 96375 TX/PRO/DX INJ NEW DRUG ADDON: CPT

## 2024-09-12 PROCEDURE — 36415 COLL VENOUS BLD VENIPUNCTURE: CPT

## 2024-09-12 RX ORDER — DEXAMETHASONE 0.75 MG
10 TABLET ORAL ONCE
Refills: 0 | Status: COMPLETED | OUTPATIENT
Start: 2024-09-12 | End: 2024-09-12

## 2024-09-12 RX ORDER — FOSAPREPITANT DIMEGLUMINE 150 MG/5ML
150 INJECTION, POWDER, LYOPHILIZED, FOR SOLUTION INTRAVENOUS ONCE
Refills: 0 | Status: COMPLETED | OUTPATIENT
Start: 2024-09-12 | End: 2024-09-12

## 2024-09-12 RX ORDER — DURVALUMAB 120 MG/2.4ML
1500 INJECTION, SOLUTION INTRAVENOUS ONCE
Refills: 0 | Status: COMPLETED | OUTPATIENT
Start: 2024-09-12 | End: 2024-09-12

## 2024-09-12 RX ORDER — CISPLATIN 1 MG/ML
45 INJECTION, SOLUTION INTRAVENOUS ONCE
Refills: 0 | Status: COMPLETED | OUTPATIENT
Start: 2024-09-12 | End: 2024-09-12

## 2024-09-12 RX ORDER — GEMCITABINE 10 MG/ML
1800 INJECTION, SOLUTION INTRAVENOUS ONCE
Refills: 0 | Status: COMPLETED | OUTPATIENT
Start: 2024-09-12 | End: 2024-09-12

## 2024-09-12 RX ORDER — PALONOSETRON HYDROCHLORIDE 0.25 MG/5ML
0.25 INJECTION INTRAVENOUS ONCE
Refills: 0 | Status: COMPLETED | OUTPATIENT
Start: 2024-09-12 | End: 2024-09-12

## 2024-09-12 RX ADMIN — SODIUM CHLORIDE 10 MILLILITER(S): 9 INJECTION INTRAMUSCULAR; INTRAVENOUS; SUBCUTANEOUS at 15:10

## 2024-09-12 RX ADMIN — CISPLATIN 45 MILLIGRAM(S): 1 INJECTION, SOLUTION INTRAVENOUS at 10:29

## 2024-09-12 RX ADMIN — DURVALUMAB 1500 MILLIGRAM(S): 120 INJECTION, SOLUTION INTRAVENOUS at 09:51

## 2024-09-12 RX ADMIN — Medication 204 MILLIGRAM(S): at 11:24

## 2024-09-12 RX ADMIN — Medication 507 MILLILITER(S): at 13:00

## 2024-09-12 RX ADMIN — Medication 1000 MILLILITER(S): at 15:00

## 2024-09-12 RX ADMIN — CISPLATIN 45 MILLIGRAM(S): 1 INJECTION, SOLUTION INTRAVENOUS at 11:30

## 2024-09-12 RX ADMIN — SODIUM CHLORIDE 1000 MILLILITER(S): 9 INJECTION INTRAMUSCULAR; INTRAVENOUS; SUBCUTANEOUS at 09:30

## 2024-09-12 RX ADMIN — Medication 10 MILLIGRAM(S): at 11:39

## 2024-09-12 RX ADMIN — GEMCITABINE 1800 MILLIGRAM(S): 10 INJECTION, SOLUTION INTRAVENOUS at 12:30

## 2024-09-12 RX ADMIN — GEMCITABINE 1800 MILLIGRAM(S): 10 INJECTION, SOLUTION INTRAVENOUS at 11:30

## 2024-09-12 RX ADMIN — SODIUM CHLORIDE 500 MILLILITER(S): 9 INJECTION INTRAMUSCULAR; INTRAVENOUS; SUBCUTANEOUS at 10:00

## 2024-09-12 RX ADMIN — PALONOSETRON HYDROCHLORIDE 0.25 MILLIGRAM(S): 0.25 INJECTION INTRAVENOUS at 11:22

## 2024-09-12 RX ADMIN — FOSAPREPITANT DIMEGLUMINE 500 MILLIGRAM(S): 150 INJECTION, POWDER, LYOPHILIZED, FOR SOLUTION INTRAVENOUS at 11:41

## 2024-09-12 RX ADMIN — DURVALUMAB 1500 MILLIGRAM(S): 120 INJECTION, SOLUTION INTRAVENOUS at 11:00

## 2024-09-12 RX ADMIN — FOSAPREPITANT DIMEGLUMINE 150 MILLIGRAM(S): 150 INJECTION, POWDER, LYOPHILIZED, FOR SOLUTION INTRAVENOUS at 12:20

## 2024-09-12 NOTE — DISCHARGE INSTRUCTIONS: CHEMOTHERAPY - [id number]_A_AMB
Gabapentin 600 mg-1/2 tab nightly and increase by 1/2 tab every 7 days, as tolerated, until taking 3 tabs daily.      Side effects of Gabapentin: Vertigo, cognitive dysfunction, weight gain        Myofascial self care: Heat sore muscles for 20 minutes. To massage muscles, put tennis ball in a nylon stocking (can also purchase a backnobber or theracane). Position ball against a wall. Lean back into the ball and move body to massage the area. You should feel some pressure, but not pain while you are doing this. Do this for 5 minutes 2-3 times a day.      Yes

## 2024-09-15 NOTE — PHYSICAL EXAM
[Restricted in physically strenuous activity but ambulatory and able to carry out work of a light or sedentary nature] : Status 1- Restricted in physically strenuous activity but ambulatory and able to carry out work of a light or sedentary nature, e.g., light house work, office work [Normal] : affect appropriate [de-identified] : + right upper quadrant/epigastric mass,  non tender, no rebound or guarding [de-identified] : No LE edema, right  port in place. No swelling or tender,  mild erythema at upper incision site but no open wound or discharge.-imrpoved

## 2024-09-15 NOTE — REVIEW OF SYSTEMS
[Diarrhea: Grade 0] : Diarrhea: Grade 0 [Negative] : Heme/Lymph [Fever] : no fever [Abdominal Pain] : no abdominal pain [de-identified] : intermittent episodes of dizziness attributed to vestibular neuritits

## 2024-09-15 NOTE — PHYSICAL EXAM
[Restricted in physically strenuous activity but ambulatory and able to carry out work of a light or sedentary nature] : Status 1- Restricted in physically strenuous activity but ambulatory and able to carry out work of a light or sedentary nature, e.g., light house work, office work [Normal] : affect appropriate [de-identified] : No LE edema, right  port in place. No swelling or tender,  mild erythema at upper incision site but no open wound or discharge.-imrpoved  [de-identified] : + right upper quadrant/epigastric mass,  non tender, no rebound or guarding

## 2024-09-15 NOTE — HISTORY OF PRESENT ILLNESS
[de-identified] : Rasheeda Cornejo is a 78 year old female who is here for follow up for unresectable intrahepatic cholangiocarcinoma.   Oncologic History: 1. ovarian ca - 2007- LUIS/BSO, adjuvant chemotherapy  2. intrahepatic cholangiocarcinoma --presentation: abdominal discomfort. --CT abdomen 3/2024: Mass in L lobe of liver with portal vein obstruction. Mass measures 10 x 7 x 9 cm. --AFP 27, CA 19-9 normal, CEA normal. --MRI abdomen 4/2024: 11 x 7 cm mass in the L lobe. exophytic component exerting mass efffect on pancreas. --saw surgical oncologist. Unresectable disease. --Biopsy: adenocarcinoma. MMR intact by IHC. CK7, CK20 positive. negative CDX2, TTF1, Gata3, PAX8, ER. Favor cholangiocarcinoma. 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 TMB = 5 mut/mB, MSI-H not detected. Additional mutations in ARID1A, ASXL1, MYC, RB1.  --Y-90 radioembolization 5/1/24. 2nd dose on 6/10/24 --pt declined germline genetic testing for BAP1 mutation --7/15/24 MR Abdomen: Since April 3, 2024, significantly decreased enhancement and slightly decreased size of intrahepatic cholangiocarcinoma in left lobe status post Y-90. --initiated chemoimmunotherapy with gemcitabine/cisplatin/durvalumab on 8/2/24  PMH is notable for ovarian cancer s/p LUIS/BSO followed by adjuvant platinum/taxane based chemotherapy in 2007. Vestibular neuritis FH:  Sister leukemia. Mother breast cancer. Father lymphoma. Paternal grandfather unspecified GI malignancy. Does not have children or other first degree relatives. 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  no children. [de-identified] : Here for cycle 3 day 1durvalumab/ cisplatin/gemcitabine.  symptoms related to vestibular neuritis including blurry vision  headaches, and dizziness were worse for a few days after the chemo and now improved. Continues with vestibular PT and building strength. + increased fatigue, manageable. No immune toxicities noted at this visit.  appetite and eating are stable. Otherwise she feels ok. Denies CP, SOB, diarrhea, N, V, dysuria, hematuria, rash, hearing changes or abdominal pain.

## 2024-09-15 NOTE — REVIEW OF SYSTEMS
[Diarrhea: Grade 0] : Diarrhea: Grade 0 [Negative] : Heme/Lymph [Fever] : no fever [Abdominal Pain] : no abdominal pain [de-identified] : intermittent episodes of dizziness attributed to vestibular neuritits

## 2024-09-15 NOTE — HISTORY OF PRESENT ILLNESS
[de-identified] : Rasheeda Cornejo is a 78 year old female who is here for follow up for unresectable intrahepatic cholangiocarcinoma.   Oncologic History: 1. ovarian ca - 2007- LUIS/BSO, adjuvant chemotherapy  2. intrahepatic cholangiocarcinoma --presentation: abdominal discomfort. --CT abdomen 3/2024: Mass in L lobe of liver with portal vein obstruction. Mass measures 10 x 7 x 9 cm. --AFP 27, CA 19-9 normal, CEA normal. --MRI abdomen 4/2024: 11 x 7 cm mass in the L lobe. exophytic component exerting mass efffect on pancreas. --saw surgical oncologist. Unresectable disease. --Biopsy: adenocarcinoma. MMR intact by IHC. CK7, CK20 positive. negative CDX2, TTF1, Gata3, PAX8, ER. Favor cholangiocarcinoma. 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 TMB = 5 mut/mB, MSI-H not detected. Additional mutations in ARID1A, ASXL1, MYC, RB1.  --Y-90 radioembolization 5/1/24. 2nd dose on 6/10/24 --pt declined germline genetic testing for BAP1 mutation --7/15/24 MR Abdomen: Since April 3, 2024, significantly decreased enhancement and slightly decreased size of intrahepatic cholangiocarcinoma in left lobe status post Y-90. --initiated chemoimmunotherapy with gemcitabine/cisplatin/durvalumab on 8/2/24  PMH is notable for ovarian cancer s/p LUIS/BSO followed by adjuvant platinum/taxane based chemotherapy in 2007. Vestibular neuritis FH:  Sister leukemia. Mother breast cancer. Father lymphoma. Paternal grandfather unspecified GI malignancy. Does not have children or other first degree relatives. 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  no children. [de-identified] : Here for cycle 3 day 1durvalumab/ cisplatin/gemcitabine.  symptoms related to vestibular neuritis including blurry vision  headaches, and dizziness were worse for a few days after the chemo and now improved. Continues with vestibular PT and building strength. + increased fatigue, manageable. No immune toxicities noted at this visit.  appetite and eating are stable. Otherwise she feels ok. Denies CP, SOB, diarrhea, N, V, dysuria, hematuria, rash, hearing changes or abdominal pain.

## 2024-09-15 NOTE — HISTORY OF PRESENT ILLNESS
[de-identified] : Rasheeda Cornejo is a 78 year old female who is here for follow up for unresectable intrahepatic cholangiocarcinoma.   Oncologic History: 1. ovarian ca - 2007- LUIS/BSO, adjuvant chemotherapy  2. intrahepatic cholangiocarcinoma --presentation: abdominal discomfort. --CT abdomen 3/2024: Mass in L lobe of liver with portal vein obstruction. Mass measures 10 x 7 x 9 cm. --AFP 27, CA 19-9 normal, CEA normal. --MRI abdomen 4/2024: 11 x 7 cm mass in the L lobe. exophytic component exerting mass efffect on pancreas. --saw surgical oncologist. Unresectable disease. --Biopsy: adenocarcinoma. MMR intact by IHC. CK7, CK20 positive. negative CDX2, TTF1, Gata3, PAX8, ER. Favor cholangiocarcinoma. 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 TMB = 5 mut/mB, MSI-H not detected. Additional mutations in ARID1A, ASXL1, MYC, RB1.  --Y-90 radioembolization 5/1/24. 2nd dose on 6/10/24 --pt declined germline genetic testing for BAP1 mutation --7/15/24 MR Abdomen: Since April 3, 2024, significantly decreased enhancement and slightly decreased size of intrahepatic cholangiocarcinoma in left lobe status post Y-90. --initiated chemoimmunotherapy with gemcitabine/cisplatin/durvalumab on 8/2/24  PMH is notable for ovarian cancer s/p LUIS/BSO followed by adjuvant platinum/taxane based chemotherapy in 2007. Vestibular neuritis FH:  Sister leukemia. Mother breast cancer. Father lymphoma. Paternal grandfather unspecified GI malignancy. Does not have children or other first degree relatives. 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  no children. [de-identified] : Here for cycle 3 day 1durvalumab/ cisplatin/gemcitabine.  symptoms related to vestibular neuritis including blurry vision  headaches, and dizziness were worse for a few days after the chemo and now improved. Continues with vestibular PT and building strength. + increased fatigue, manageable. No immune toxicities noted at this visit.  appetite and eating are stable. Otherwise she feels ok. Denies CP, SOB, diarrhea, N, V, dysuria, hematuria, rash, hearing changes or abdominal pain.

## 2024-09-15 NOTE — ASSESSMENT
[FreeTextEntry1] : 78 F with PMHx of HTN, HLD asthma, ovarian cancer, presents for follow up of unresectable intrahepatic cholangiocarcinoma.  s/p Y-90 radioembolization in May and June 2024, followed by chemoimmunotherapy with gemcitabine/cisplatin/durvalumab on 8/2/24  plan # Unresectable intrahepatic cholangiocarcinoma --reviewed available labs, imaging, pathology, diagnosis, and treatment with patient and cousin on initial visit. Overall, clinical picture concerning for advanced intrahepatic cholangiocarcinoma, unresectable due to obstruction of the portal vein. --tolerating chemoimmunotherapy with mild, manageable side effects. No immune related toxicities at this visit. CBC, CMP reviewed and will proceed with cycle 3 day 1 of durvalumab/cisplatin/gemcitabine today.  --next imaging after C4 of treatment with MRI abdomen, will order at next visit.  --CEA and CA 19-9 are normal,  AFP follow,   # vestibular neuritis --symptoms including headache, dizziness, and blurry vision  were worse right after the chemo, then improved.  --hearing test in May 2024 was normal(scanned) --continue vestibular PT --f/u with ENT, Dr. Vogel   C3D8 on 9/19 RTC for C4D1 with gemcitabine/cisplatin/durvalumab on 10/3 labs with CBC, CMP, Mag, AFP

## 2024-09-15 NOTE — REVIEW OF SYSTEMS
[Diarrhea: Grade 0] : Diarrhea: Grade 0 [Negative] : Heme/Lymph [Fever] : no fever [Abdominal Pain] : no abdominal pain [de-identified] : intermittent episodes of dizziness attributed to vestibular neuritits

## 2024-09-15 NOTE — PHYSICAL EXAM
[Restricted in physically strenuous activity but ambulatory and able to carry out work of a light or sedentary nature] : Status 1- Restricted in physically strenuous activity but ambulatory and able to carry out work of a light or sedentary nature, e.g., light house work, office work [Normal] : affect appropriate [de-identified] : + right upper quadrant/epigastric mass,  non tender, no rebound or guarding [de-identified] : No LE edema, right  port in place. No swelling or tender,  mild erythema at upper incision site but no open wound or discharge.-imrpoved

## 2024-09-17 RX ORDER — SODIUM CHLORIDE 9 MG/ML
10 INJECTION INTRAMUSCULAR; INTRAVENOUS; SUBCUTANEOUS ONCE
Refills: 0 | Status: COMPLETED | OUTPATIENT
Start: 2024-09-19 | End: 2024-09-19

## 2024-09-17 RX ORDER — LIDOCAINE/BENZALKONIUM/ALCOHOL
1 SOLUTION, NON-ORAL TOPICAL ONCE
Refills: 0 | Status: COMPLETED | OUTPATIENT
Start: 2024-09-19 | End: 2024-09-19

## 2024-09-19 ENCOUNTER — APPOINTMENT (OUTPATIENT)
Dept: INFUSION THERAPY | Facility: CLINIC | Age: 79
End: 2024-09-19

## 2024-09-19 ENCOUNTER — OUTPATIENT (OUTPATIENT)
Dept: OUTPATIENT SERVICES | Facility: HOSPITAL | Age: 79
LOS: 1 days | End: 2024-09-19
Payer: MEDICARE

## 2024-09-19 VITALS
HEART RATE: 88 BPM | RESPIRATION RATE: 18 BRPM | DIASTOLIC BLOOD PRESSURE: 79 MMHG | OXYGEN SATURATION: 98 % | TEMPERATURE: 98 F | SYSTOLIC BLOOD PRESSURE: 136 MMHG

## 2024-09-19 VITALS
RESPIRATION RATE: 18 BRPM | WEIGHT: 156.31 LBS | HEART RATE: 74 BPM | SYSTOLIC BLOOD PRESSURE: 122 MMHG | TEMPERATURE: 99 F | HEIGHT: 68 IN | DIASTOLIC BLOOD PRESSURE: 71 MMHG | OXYGEN SATURATION: 98 %

## 2024-09-19 DIAGNOSIS — K50.90 CROHN'S DISEASE, UNSPECIFIED, WITHOUT COMPLICATIONS: ICD-10-CM

## 2024-09-19 DIAGNOSIS — C22.1 INTRAHEPATIC BILE DUCT CARCINOMA: ICD-10-CM

## 2024-09-19 LAB
ALBUMIN SERPL ELPH-MCNC: 3.6 G/DL — SIGNIFICANT CHANGE UP (ref 3.3–5)
ALP SERPL-CCNC: 159 U/L — HIGH (ref 40–120)
ALT FLD-CCNC: 16 U/L — SIGNIFICANT CHANGE UP (ref 10–45)
ANION GAP SERPL CALC-SCNC: -3 MMOL/L — LOW (ref 5–17)
AST SERPL-CCNC: 28 U/L — SIGNIFICANT CHANGE UP (ref 10–40)
BILIRUB SERPL-MCNC: 0.6 MG/DL — SIGNIFICANT CHANGE UP (ref 0.2–1.2)
BUN SERPL-MCNC: 16 MG/DL — SIGNIFICANT CHANGE UP (ref 7–23)
CALCIUM SERPL-MCNC: 9.5 MG/DL — SIGNIFICANT CHANGE UP (ref 8.4–10.5)
CHLORIDE SERPL-SCNC: 107 MMOL/L — SIGNIFICANT CHANGE UP (ref 96–108)
CO2 SERPL-SCNC: 29 MMOL/L — SIGNIFICANT CHANGE UP (ref 22–31)
CREAT SERPL-MCNC: 0.7 MG/DL — SIGNIFICANT CHANGE UP (ref 0.5–1.3)
EGFR: 90 ML/MIN/1.73M2 — SIGNIFICANT CHANGE UP
GLUCOSE SERPL-MCNC: 104 MG/DL — HIGH (ref 70–99)
HCT VFR BLD CALC: 31.5 % — LOW (ref 34.5–45)
HGB BLD-MCNC: 10.5 G/DL — LOW (ref 11.5–15.5)
LYMPHOCYTES # BLD AUTO: 0.7 K/UL — LOW (ref 1–3.3)
LYMPHOCYTES # BLD AUTO: 24.5 % — SIGNIFICANT CHANGE UP (ref 13–44)
MAGNESIUM SERPL-MCNC: 2 MG/DL — SIGNIFICANT CHANGE UP (ref 1.6–2.6)
MCHC RBC-ENTMCNC: 31.3 PG — SIGNIFICANT CHANGE UP (ref 27–34)
MCHC RBC-ENTMCNC: 33.3 GM/DL — SIGNIFICANT CHANGE UP (ref 32–36)
MCV RBC AUTO: 93.8 FL — SIGNIFICANT CHANGE UP (ref 80–100)
NEUTROPHILS # BLD AUTO: 2.1 K/UL — SIGNIFICANT CHANGE UP (ref 1.8–7.4)
NEUTROPHILS NFR BLD AUTO: 67.4 % — SIGNIFICANT CHANGE UP (ref 43–77)
PLATELET # BLD AUTO: 206 K/UL — SIGNIFICANT CHANGE UP (ref 150–400)
POTASSIUM SERPL-MCNC: 5.2 MMOL/L — SIGNIFICANT CHANGE UP (ref 3.5–5.3)
POTASSIUM SERPL-SCNC: 5.2 MMOL/L — SIGNIFICANT CHANGE UP (ref 3.5–5.3)
PROT SERPL-MCNC: 6.6 G/DL — SIGNIFICANT CHANGE UP (ref 6–8.3)
RBC # BLD: 3.36 M/UL — LOW (ref 3.8–5.2)
RBC # FLD: 14.9 % — HIGH (ref 10.3–14.5)
SODIUM SERPL-SCNC: 133 MMOL/L — LOW (ref 135–145)
T4 FREE SERPL-MCNC: 1.29 NG/DL — SIGNIFICANT CHANGE UP (ref 0.93–1.7)
TSH SERPL-MCNC: 1.14 UIU/ML — SIGNIFICANT CHANGE UP (ref 0.27–4.2)
WBC # BLD: 3 K/UL — LOW (ref 3.8–10.5)
WBC # FLD AUTO: 3 K/UL — LOW (ref 3.8–10.5)

## 2024-09-19 PROCEDURE — 36415 COLL VENOUS BLD VENIPUNCTURE: CPT

## 2024-09-19 PROCEDURE — 84443 ASSAY THYROID STIM HORMONE: CPT

## 2024-09-19 PROCEDURE — 84439 ASSAY OF FREE THYROXINE: CPT

## 2024-09-19 PROCEDURE — 96361 HYDRATE IV INFUSION ADD-ON: CPT

## 2024-09-19 PROCEDURE — 96367 TX/PROPH/DG ADDL SEQ IV INF: CPT

## 2024-09-19 PROCEDURE — 96375 TX/PRO/DX INJ NEW DRUG ADDON: CPT

## 2024-09-19 PROCEDURE — 96417 CHEMO IV INFUS EACH ADDL SEQ: CPT

## 2024-09-19 PROCEDURE — 85025 COMPLETE CBC W/AUTO DIFF WBC: CPT

## 2024-09-19 PROCEDURE — 96413 CHEMO IV INFUSION 1 HR: CPT

## 2024-09-19 PROCEDURE — 80053 COMPREHEN METABOLIC PANEL: CPT

## 2024-09-19 PROCEDURE — 83735 ASSAY OF MAGNESIUM: CPT

## 2024-09-19 RX ORDER — DEXAMETHASONE 0.75 MG
10 TABLET ORAL ONCE
Refills: 0 | Status: COMPLETED | OUTPATIENT
Start: 2024-09-19 | End: 2024-09-19

## 2024-09-19 RX ORDER — CISPLATIN 1 MG/ML
45 INJECTION, SOLUTION INTRAVENOUS ONCE
Refills: 0 | Status: COMPLETED | OUTPATIENT
Start: 2024-09-19 | End: 2024-09-19

## 2024-09-19 RX ORDER — PALONOSETRON HYDROCHLORIDE 0.25 MG/5ML
0.25 INJECTION INTRAVENOUS ONCE
Refills: 0 | Status: COMPLETED | OUTPATIENT
Start: 2024-09-19 | End: 2024-09-19

## 2024-09-19 RX ORDER — GEMCITABINE 10 MG/ML
1800 INJECTION, SOLUTION INTRAVENOUS ONCE
Refills: 0 | Status: COMPLETED | OUTPATIENT
Start: 2024-09-19 | End: 2024-09-19

## 2024-09-19 RX ORDER — SODIUM CHLORIDE 9 MG/ML
500 INJECTION INTRAMUSCULAR; INTRAVENOUS; SUBCUTANEOUS ONCE
Refills: 0 | Status: COMPLETED | OUTPATIENT
Start: 2024-09-19 | End: 2024-09-19

## 2024-09-19 RX ORDER — FOSAPREPITANT DIMEGLUMINE 150 MG/5ML
150 INJECTION, POWDER, LYOPHILIZED, FOR SOLUTION INTRAVENOUS ONCE
Refills: 0 | Status: COMPLETED | OUTPATIENT
Start: 2024-09-19 | End: 2024-09-19

## 2024-09-19 RX ADMIN — FOSAPREPITANT DIMEGLUMINE 150 MILLIGRAM(S): 150 INJECTION, POWDER, LYOPHILIZED, FOR SOLUTION INTRAVENOUS at 12:50

## 2024-09-19 RX ADMIN — GEMCITABINE 1800 MILLIGRAM(S): 10 INJECTION, SOLUTION INTRAVENOUS at 14:40

## 2024-09-19 RX ADMIN — CISPLATIN 45 MILLIGRAM(S): 1 INJECTION, SOLUTION INTRAVENOUS at 13:09

## 2024-09-19 RX ADMIN — Medication 507 MILLILITER(S): at 15:44

## 2024-09-19 RX ADMIN — FOSAPREPITANT DIMEGLUMINE 500 MILLIGRAM(S): 150 INJECTION, POWDER, LYOPHILIZED, FOR SOLUTION INTRAVENOUS at 12:20

## 2024-09-19 RX ADMIN — Medication 1000 MILLILITER(S): at 16:45

## 2024-09-19 RX ADMIN — SODIUM CHLORIDE 10 MILLILITER(S): 9 INJECTION INTRAMUSCULAR; INTRAVENOUS; SUBCUTANEOUS at 16:40

## 2024-09-19 RX ADMIN — SODIUM CHLORIDE 1000 MILLILITER(S): 9 INJECTION INTRAMUSCULAR; INTRAVENOUS; SUBCUTANEOUS at 11:45

## 2024-09-19 RX ADMIN — CISPLATIN 45 MILLIGRAM(S): 1 INJECTION, SOLUTION INTRAVENOUS at 14:09

## 2024-09-19 RX ADMIN — Medication 204 MILLIGRAM(S): at 12:13

## 2024-09-19 RX ADMIN — Medication 10 MILLIGRAM(S): at 12:28

## 2024-09-19 RX ADMIN — GEMCITABINE 1800 MILLIGRAM(S): 10 INJECTION, SOLUTION INTRAVENOUS at 14:10

## 2024-09-19 RX ADMIN — PALONOSETRON HYDROCHLORIDE 0.25 MILLIGRAM(S): 0.25 INJECTION INTRAVENOUS at 13:07

## 2024-09-19 RX ADMIN — SODIUM CHLORIDE 500 MILLILITER(S): 9 INJECTION INTRAMUSCULAR; INTRAVENOUS; SUBCUTANEOUS at 12:15

## 2024-10-03 ENCOUNTER — NON-APPOINTMENT (OUTPATIENT)
Age: 79
End: 2024-10-03

## 2024-10-03 ENCOUNTER — APPOINTMENT (OUTPATIENT)
Dept: INFUSION THERAPY | Facility: CLINIC | Age: 79
End: 2024-10-03

## 2024-10-03 ENCOUNTER — APPOINTMENT (OUTPATIENT)
Dept: HEMATOLOGY ONCOLOGY | Facility: CLINIC | Age: 79
End: 2024-10-03
Payer: MEDICARE

## 2024-10-03 VITALS
HEART RATE: 72 BPM | WEIGHT: 157 LBS | RESPIRATION RATE: 18 BRPM | DIASTOLIC BLOOD PRESSURE: 57 MMHG | BODY MASS INDEX: 23.52 KG/M2 | SYSTOLIC BLOOD PRESSURE: 122 MMHG | OXYGEN SATURATION: 100 % | HEIGHT: 68.5 IN

## 2024-10-03 DIAGNOSIS — R53.83 OTHER FATIGUE: ICD-10-CM

## 2024-10-03 DIAGNOSIS — C22.1 INTRAHEPATIC BILE DUCT CARCINOMA: ICD-10-CM

## 2024-10-03 DIAGNOSIS — D64.9 ANEMIA, UNSPECIFIED: ICD-10-CM

## 2024-10-03 DIAGNOSIS — T45.1X5A OTHER FATIGUE: ICD-10-CM

## 2024-10-03 PROCEDURE — 99214 OFFICE O/P EST MOD 30 MIN: CPT | Mod: 25

## 2024-10-03 PROCEDURE — 36415 COLL VENOUS BLD VENIPUNCTURE: CPT

## 2024-10-03 NOTE — PHYSICAL EXAM
[Restricted in physically strenuous activity but ambulatory and able to carry out work of a light or sedentary nature] : Status 1- Restricted in physically strenuous activity but ambulatory and able to carry out work of a light or sedentary nature, e.g., light house work, office work [Normal] : affect appropriate [de-identified] : mild right ankle edema, no tendernee. right  port in place. no signs of infection.  [de-identified] : + right upper quadrant/epigastric mass,  non tender, no rebound or guarding

## 2024-10-03 NOTE — HISTORY OF PRESENT ILLNESS
[de-identified] : Rasheeda Cornejo is a 79 year old female who is here for follow up for unresectable intrahepatic cholangiocarcinoma.   Oncologic History: 1. ovarian ca - 2007- LUIS/BSO, adjuvant chemotherapy  2. intrahepatic cholangiocarcinoma --presentation: abdominal discomfort. --CT abdomen 3/2024: Mass in L lobe of liver with portal vein obstruction. Mass measures 10 x 7 x 9 cm. --AFP 27, CA 19-9 normal, CEA normal. --MRI abdomen 4/2024: 11 x 7 cm mass in the L lobe. exophytic component exerting mass efffect on pancreas. --saw surgical oncologist. Unresectable disease. --Biopsy: adenocarcinoma. MMR intact by IHC. CK7, CK20 positive. negative CDX2, TTF1, Gata3, PAX8, ER. Favor cholangiocarcinoma. 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 TMB = 5 mut/mB, MSI-H not detected. Additional mutations in ARID1A, ASXL1, MYC, RB1.  --Y-90 radioembolization 5/1/24. 2nd dose on 6/10/24 --pt declined germline genetic testing for BAP1 mutation --7/15/24 MR Abdomen: Since April 3, 2024, significantly decreased enhancement and slightly decreased size of intrahepatic cholangiocarcinoma in left lobe status post Y-90. --initiated chemoimmunotherapy with gemcitabine/cisplatin/durvalumab on 8/2/24  PMH is notable for ovarian cancer s/p LUIS/BSO followed by adjuvant platinum/taxane based chemotherapy in 2007. Vestibular neuritis FH:  Sister leukemia. Mother breast cancer. Father lymphoma. Paternal grandfather unspecified GI malignancy. Does not have children or other first degree relatives. 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  no children. [de-identified] : Here for cycle 4 day1 durvalumab/ cisplatin/gemcitabine.  symptoms related to vestibular neuritis including blurry vision, headaches( Advil prn with help), and dizziness have been worse since she started chemo, but manageable. Stopped vestibular PT due to chemotherapy, occasional off balance. no episode of recent fall.   + increased fatigue, more noticeable, trying to be active, is able to go grocery shopping and do house stuff.   No immune toxicities noted at this visit.  appetite is great, weight stable Otherwise she feels ok at this visit. Denies CP, SOB, diarrhea, N, V, dysuria, hematuria, rash, hearing changes or abdominal pain.

## 2024-10-03 NOTE — ASSESSMENT
[FreeTextEntry1] : 79 F with PMHx of HTN, HLD asthma, ovarian cancer, presents for follow up of unresectable intrahepatic cholangiocarcinoma.  s/p Y-90 radioembolization in May and June 2024, followed by chemoimmunotherapy with gemcitabine/cisplatin/durvalumab on 8/2/24  plan # Unresectable intrahepatic cholangiocarcinoma --reviewed available labs, imaging, pathology, diagnosis, and treatment with patient and cousin on initial visit. Overall, clinical picture concerning for advanced intrahepatic cholangiocarcinoma, unresectable due to obstruction of the portal vein. --tolerating chemoimmunotherapy with manageable side effects. No immune related toxicities at this visit. CBC, CMP reviewed, looks stable. Will proceed with cycle 4 day 1 of durvalumab/cisplatin/gemcitabine today.  --will repeat MRI abdomen after cycle 4,  scheduled for  10/18/24 --CEA and CA 19-9 are normal,  AFP follow, recent AFP went down to 11.4, pending today.   # vestibular neuritis --symptoms including headache, dizziness, and blurry vision have been worse since started chemo, but manageable  --hearing test in May 2024 was normal(scanned) --if symptoms are worse,  will consider dose reduction.  Ok for current dose of chemo today.  --f/u with Dr. Vogel  --continue supportive management.   # chemo induced fatigue/anemia --maintain good PO hydration, light daily exercise, healthy diet.  --Hgb 10.1, stable, monitor for now --if worse, will consider dose reduction.  --will obtain ferritin, iron/TIBC at next visit   C4D8 on 10/10, MRI abdomen on 10/18 RTC for C5D1 with gemcitabine/cisplatin/durvalumab on 10/24 labs with CBC, CMP, Mag, ferritin, iron/TIBC

## 2024-10-03 NOTE — PHYSICAL EXAM
[Restricted in physically strenuous activity but ambulatory and able to carry out work of a light or sedentary nature] : Status 1- Restricted in physically strenuous activity but ambulatory and able to carry out work of a light or sedentary nature, e.g., light house work, office work [Normal] : affect appropriate [de-identified] : mild right ankle edema, no tendernee. right  port in place. no signs of infection.  [de-identified] : + right upper quadrant/epigastric mass,  non tender, no rebound or guarding

## 2024-10-03 NOTE — HISTORY OF PRESENT ILLNESS
[de-identified] : Rasheeda Corenjo is a 79 year old female who is here for follow up for unresectable intrahepatic cholangiocarcinoma.   Oncologic History: 1. ovarian ca - 2007- LUIS/BSO, adjuvant chemotherapy  2. intrahepatic cholangiocarcinoma --presentation: abdominal discomfort. --CT abdomen 3/2024: Mass in L lobe of liver with portal vein obstruction. Mass measures 10 x 7 x 9 cm. --AFP 27, CA 19-9 normal, CEA normal. --MRI abdomen 4/2024: 11 x 7 cm mass in the L lobe. exophytic component exerting mass efffect on pancreas. --saw surgical oncologist. Unresectable disease. --Biopsy: adenocarcinoma. MMR intact by IHC. CK7, CK20 positive. negative CDX2, TTF1, Gata3, PAX8, ER. Favor cholangiocarcinoma. 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 TMB = 5 mut/mB, MSI-H not detected. Additional mutations in ARID1A, ASXL1, MYC, RB1.  --Y-90 radioembolization 5/1/24. 2nd dose on 6/10/24 --pt declined germline genetic testing for BAP1 mutation --7/15/24 MR Abdomen: Since April 3, 2024, significantly decreased enhancement and slightly decreased size of intrahepatic cholangiocarcinoma in left lobe status post Y-90. --initiated chemoimmunotherapy with gemcitabine/cisplatin/durvalumab on 8/2/24  PMH is notable for ovarian cancer s/p LUIS/BSO followed by adjuvant platinum/taxane based chemotherapy in 2007. Vestibular neuritis FH:  Sister leukemia. Mother breast cancer. Father lymphoma. Paternal grandfather unspecified GI malignancy. Does not have children or other first degree relatives. 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  no children. [de-identified] : Here for cycle 4 day1 durvalumab/ cisplatin/gemcitabine.  symptoms related to vestibular neuritis including blurry vision, headaches( Advil prn with help), and dizziness have been worse since she started chemo, but manageable. Stopped vestibular PT due to chemotherapy, occasional off balance. no episode of recent fall.   + increased fatigue, more noticeable, trying to be active, is able to go grocery shopping and do house stuff.   No immune toxicities noted at this visit.  appetite is great, weight stable Otherwise she feels ok at this visit. Denies CP, SOB, diarrhea, N, V, dysuria, hematuria, rash, hearing changes or abdominal pain.

## 2024-10-04 LAB
AFP-TM SERPL-MCNC: 10 NG/ML
ALBUMIN SERPL ELPH-MCNC: 3.3 G/DL
ALP BLD-CCNC: 159 U/L
ALT SERPL-CCNC: 13 U/L
ANION GAP SERPL CALC-SCNC: 1 MMOL/L
AST SERPL-CCNC: 26 U/L
BILIRUB SERPL-MCNC: 0.5 MG/DL
BUN SERPL-MCNC: 22 MG/DL
CALCIUM SERPL-MCNC: 9.5 MG/DL
CHLORIDE SERPL-SCNC: 110 MMOL/L
CO2 SERPL-SCNC: 26 MMOL/L
CREAT SERPL-MCNC: 0.8 MG/DL
EGFR: 76 ML/MIN/1.73M2
GLUCOSE SERPL-MCNC: 109 MG/DL
HCT VFR BLD CALC: 30.1 %
HGB BLD-MCNC: 10.1 G/DL
LYMPHOCYTES # BLD AUTO: 0.4 K/UL
LYMPHOCYTES NFR BLD AUTO: 12 %
MAGNESIUM SERPL-MCNC: 2 MG/DL
MAN DIFF?: NO
MCHC RBC-ENTMCNC: 32.2 PG
MCHC RBC-ENTMCNC: 33.6 GM/DL
MCV RBC AUTO: 95.9 FL
NEUTROPHILS # BLD AUTO: 2.6 K/UL
NEUTROPHILS NFR BLD AUTO: 70.3 %
PLATELET # BLD AUTO: 200 K/UL
POTASSIUM SERPL-SCNC: 4.6 MMOL/L
PROT SERPL-MCNC: 6.5 G/DL
RBC # BLD: 3.14 M/UL
RBC # FLD: 16.8 %
SODIUM SERPL-SCNC: 137 MMOL/L
WBC # FLD AUTO: 3.6 K/UL

## 2024-10-08 RX ORDER — SODIUM CHLORIDE IRRIG SOLUTION 0.9 %
1000 SOLUTION, IRRIGATION IRRIGATION
Refills: 0 | Status: COMPLETED | OUTPATIENT
Start: 2024-10-10 | End: 2024-10-10

## 2024-10-08 RX ORDER — SODIUM CHLORIDE 0.9 % (FLUSH) 0.9 %
10 SYRINGE (ML) INJECTION ONCE
Refills: 0 | Status: COMPLETED | OUTPATIENT
Start: 2024-10-10 | End: 2024-10-10

## 2024-10-10 ENCOUNTER — OUTPATIENT (OUTPATIENT)
Dept: OUTPATIENT SERVICES | Facility: HOSPITAL | Age: 79
LOS: 1 days | End: 2024-10-10
Payer: MEDICARE

## 2024-10-10 ENCOUNTER — APPOINTMENT (OUTPATIENT)
Dept: INFUSION THERAPY | Facility: CLINIC | Age: 79
End: 2024-10-10

## 2024-10-10 VITALS
SYSTOLIC BLOOD PRESSURE: 143 MMHG | WEIGHT: 157.41 LBS | DIASTOLIC BLOOD PRESSURE: 66 MMHG | TEMPERATURE: 99 F | HEIGHT: 68 IN | HEART RATE: 74 BPM | RESPIRATION RATE: 18 BRPM | OXYGEN SATURATION: 98 %

## 2024-10-10 VITALS
DIASTOLIC BLOOD PRESSURE: 80 MMHG | TEMPERATURE: 98 F | SYSTOLIC BLOOD PRESSURE: 123 MMHG | HEART RATE: 70 BPM | OXYGEN SATURATION: 99 % | RESPIRATION RATE: 18 BRPM

## 2024-10-10 DIAGNOSIS — C22.1 INTRAHEPATIC BILE DUCT CARCINOMA: ICD-10-CM

## 2024-10-10 LAB
ALBUMIN SERPL ELPH-MCNC: 3.2 G/DL — LOW (ref 3.3–5)
ALP SERPL-CCNC: 166 U/L — HIGH (ref 40–120)
ALT FLD-CCNC: 11 U/L — SIGNIFICANT CHANGE UP (ref 10–45)
ANION GAP SERPL CALC-SCNC: 1 MMOL/L — LOW (ref 5–17)
AST SERPL-CCNC: 28 U/L — SIGNIFICANT CHANGE UP (ref 10–40)
BILIRUB SERPL-MCNC: 0.5 MG/DL — SIGNIFICANT CHANGE UP (ref 0.2–1.2)
BUN SERPL-MCNC: 19 MG/DL — SIGNIFICANT CHANGE UP (ref 7–23)
CALCIUM SERPL-MCNC: 9.8 MG/DL — SIGNIFICANT CHANGE UP (ref 8.4–10.5)
CHLORIDE SERPL-SCNC: 104 MMOL/L — SIGNIFICANT CHANGE UP (ref 96–108)
CO2 SERPL-SCNC: 30 MMOL/L — SIGNIFICANT CHANGE UP (ref 22–31)
CREAT SERPL-MCNC: 0.7 MG/DL — SIGNIFICANT CHANGE UP (ref 0.5–1.3)
EGFR: 89 ML/MIN/1.73M2 — SIGNIFICANT CHANGE UP
GLUCOSE SERPL-MCNC: 103 MG/DL — HIGH (ref 70–99)
HCT VFR BLD CALC: 29.7 % — LOW (ref 34.5–45)
HGB BLD-MCNC: 9.8 G/DL — LOW (ref 11.5–15.5)
LYMPHOCYTES # BLD AUTO: 0.7 K/UL — LOW (ref 1–3.3)
LYMPHOCYTES # BLD AUTO: 28.2 % — SIGNIFICANT CHANGE UP (ref 13–44)
MAGNESIUM SERPL-MCNC: 1.9 MG/DL — SIGNIFICANT CHANGE UP (ref 1.6–2.6)
MCHC RBC-ENTMCNC: 31.4 PG — SIGNIFICANT CHANGE UP (ref 27–34)
MCHC RBC-ENTMCNC: 33 GM/DL — SIGNIFICANT CHANGE UP (ref 32–36)
MCV RBC AUTO: 95.2 FL — SIGNIFICANT CHANGE UP (ref 80–100)
NEUTROPHILS # BLD AUTO: 1.4 K/UL — LOW (ref 1.8–7.4)
NEUTROPHILS NFR BLD AUTO: 57.2 % — SIGNIFICANT CHANGE UP (ref 43–77)
PLATELET # BLD AUTO: 216 K/UL — SIGNIFICANT CHANGE UP (ref 150–400)
POTASSIUM SERPL-MCNC: 5.5 MMOL/L — HIGH (ref 3.5–5.3)
POTASSIUM SERPL-SCNC: 5.5 MMOL/L — HIGH (ref 3.5–5.3)
PROT SERPL-MCNC: 6.8 G/DL — SIGNIFICANT CHANGE UP (ref 6–8.3)
RBC # BLD: 3.12 M/UL — LOW (ref 3.8–5.2)
RBC # FLD: 15.4 % — HIGH (ref 10.3–14.5)
SODIUM SERPL-SCNC: 135 MMOL/L — SIGNIFICANT CHANGE UP (ref 135–145)
WBC # BLD: 2.5 K/UL — LOW (ref 3.8–10.5)
WBC # FLD AUTO: 2.5 K/UL — LOW (ref 3.8–10.5)

## 2024-10-10 RX ORDER — PALONOSETRON 0.25 MG/5ML
0.25 INJECTION, SOLUTION INTRAVENOUS ONCE
Refills: 0 | Status: COMPLETED | OUTPATIENT
Start: 2024-10-10 | End: 2024-10-10

## 2024-10-10 RX ORDER — FOSAPREPITANT 150 MG/5ML
150 INJECTION, POWDER, LYOPHILIZED, FOR SOLUTION INTRAVENOUS ONCE
Refills: 0 | Status: COMPLETED | OUTPATIENT
Start: 2024-10-10 | End: 2024-10-10

## 2024-10-10 RX ORDER — CISPLATIN 1 MG/ML
45 INJECTION, SOLUTION INTRAVENOUS ONCE
Refills: 0 | Status: COMPLETED | OUTPATIENT
Start: 2024-10-10 | End: 2024-10-10

## 2024-10-10 RX ORDER — LIDOCAINE 50 MG/G
1 CREAM TOPICAL ONCE
Refills: 0 | Status: COMPLETED | OUTPATIENT
Start: 2024-10-10 | End: 2024-10-10

## 2024-10-10 RX ORDER — GEMCITABINE HYDROCHLORIDE 1 G/26.3ML
1800 INJECTION INTRAVENOUS ONCE
Refills: 0 | Status: COMPLETED | OUTPATIENT
Start: 2024-10-10 | End: 2024-10-10

## 2024-10-10 RX ORDER — SODIUM CHLORIDE 0.9 % (FLUSH) 0.9 %
500 SYRINGE (ML) INJECTION ONCE
Refills: 0 | Status: COMPLETED | OUTPATIENT
Start: 2024-10-10 | End: 2024-10-10

## 2024-10-10 RX ADMIN — CISPLATIN 45 MILLIGRAM(S): 1 INJECTION, SOLUTION INTRAVENOUS at 15:12

## 2024-10-10 RX ADMIN — FOSAPREPITANT 510 MILLIGRAM(S): 150 INJECTION, POWDER, LYOPHILIZED, FOR SOLUTION INTRAVENOUS at 13:16

## 2024-10-10 RX ADMIN — CISPLATIN 45 MILLIGRAM(S): 1 INJECTION, SOLUTION INTRAVENOUS at 14:12

## 2024-10-10 RX ADMIN — Medication 1000 MILLILITER(S): at 17:50

## 2024-10-10 RX ADMIN — GEMCITABINE HYDROCHLORIDE 1800 MILLIGRAM(S): 1 INJECTION INTRAVENOUS at 15:50

## 2024-10-10 RX ADMIN — Medication 10 MILLIGRAM(S): at 14:02

## 2024-10-10 RX ADMIN — FOSAPREPITANT 150 MILLIGRAM(S): 150 INJECTION, POWDER, LYOPHILIZED, FOR SOLUTION INTRAVENOUS at 13:46

## 2024-10-10 RX ADMIN — PALONOSETRON 0.25 MILLIGRAM(S): 0.25 INJECTION, SOLUTION INTRAVENOUS at 13:15

## 2024-10-10 RX ADMIN — GEMCITABINE HYDROCHLORIDE 1800 MILLIGRAM(S): 1 INJECTION INTRAVENOUS at 15:20

## 2024-10-10 RX ADMIN — Medication 500 MILLILITER(S): at 13:10

## 2024-10-10 RX ADMIN — Medication 204 MILLIGRAM(S): at 13:47

## 2024-10-10 RX ADMIN — Medication 507 MILLILITER(S): at 15:50

## 2024-10-10 RX ADMIN — Medication 1000 MILLILITER(S): at 12:40

## 2024-10-10 RX ADMIN — Medication 10 MILLILITER(S): at 17:54

## 2024-10-16 ENCOUNTER — APPOINTMENT (OUTPATIENT)
Dept: HEMATOLOGY ONCOLOGY | Facility: CLINIC | Age: 79
End: 2024-10-16

## 2024-10-16 VITALS
RESPIRATION RATE: 18 BRPM | OXYGEN SATURATION: 97 % | HEIGHT: 68 IN | HEART RATE: 76 BPM | BODY MASS INDEX: 24.4 KG/M2 | SYSTOLIC BLOOD PRESSURE: 143 MMHG | TEMPERATURE: 97.5 F | WEIGHT: 161 LBS | DIASTOLIC BLOOD PRESSURE: 83 MMHG

## 2024-10-16 DIAGNOSIS — T45.1X5A OTHER FATIGUE: ICD-10-CM

## 2024-10-16 DIAGNOSIS — R53.83 OTHER FATIGUE: ICD-10-CM

## 2024-10-16 PROCEDURE — 99213 OFFICE O/P EST LOW 20 MIN: CPT

## 2024-10-18 ENCOUNTER — APPOINTMENT (OUTPATIENT)
Dept: MRI IMAGING | Facility: HOSPITAL | Age: 79
End: 2024-10-18

## 2024-10-18 ENCOUNTER — OUTPATIENT (OUTPATIENT)
Dept: OUTPATIENT SERVICES | Facility: HOSPITAL | Age: 79
LOS: 1 days | End: 2024-10-18
Payer: MEDICARE

## 2024-10-18 PROCEDURE — 74183 MRI ABD W/O CNTR FLWD CNTR: CPT | Mod: 26,MH

## 2024-10-23 RX ORDER — SODIUM CHLORIDE 9 MG/ML
10 INJECTION, SOLUTION INTRAMUSCULAR; INTRAVENOUS; SUBCUTANEOUS ONCE
Refills: 0 | Status: COMPLETED | OUTPATIENT
Start: 2024-10-24 | End: 2024-10-24

## 2024-10-23 RX ORDER — PALONOSETRON HYDROCHLORIDE 0.05 MG/ML
0.25 INJECTION INTRAVENOUS ONCE
Refills: 0 | Status: COMPLETED | OUTPATIENT
Start: 2024-10-24 | End: 2024-10-24

## 2024-10-23 RX ORDER — DEXAMETHASONE 1.5 MG 1.5 MG/1
10 TABLET ORAL ONCE
Refills: 0 | Status: COMPLETED | OUTPATIENT
Start: 2024-10-24 | End: 2024-10-24

## 2024-10-23 RX ORDER — FOSAPREPITANT 150 MG/5ML
150 INJECTION, POWDER, LYOPHILIZED, FOR SOLUTION INTRAVENOUS ONCE
Refills: 0 | Status: COMPLETED | OUTPATIENT
Start: 2024-10-24 | End: 2024-10-24

## 2024-10-23 RX ORDER — SODIUM CHLORIDE 9 MG/ML
500 INJECTION, SOLUTION INTRAMUSCULAR; INTRAVENOUS; SUBCUTANEOUS ONCE
Refills: 0 | Status: COMPLETED | OUTPATIENT
Start: 2024-10-24 | End: 2024-10-24

## 2024-10-23 RX ORDER — LIDOCAINE HYDROCHLORIDE 40 MG/ML
1 SOLUTION TOPICAL ONCE
Refills: 0 | Status: COMPLETED | OUTPATIENT
Start: 2024-10-24 | End: 2024-10-24

## 2024-10-24 ENCOUNTER — APPOINTMENT (OUTPATIENT)
Dept: HEMATOLOGY ONCOLOGY | Facility: CLINIC | Age: 79
End: 2024-10-24
Payer: MEDICARE

## 2024-10-24 ENCOUNTER — APPOINTMENT (OUTPATIENT)
Dept: INFUSION THERAPY | Facility: CLINIC | Age: 79
End: 2024-10-24

## 2024-10-24 ENCOUNTER — OUTPATIENT (OUTPATIENT)
Dept: OUTPATIENT SERVICES | Facility: HOSPITAL | Age: 79
LOS: 1 days | End: 2024-10-24
Payer: MEDICARE

## 2024-10-24 VITALS
OXYGEN SATURATION: 99 % | RESPIRATION RATE: 18 BRPM | WEIGHT: 160.94 LBS | HEIGHT: 68 IN | HEART RATE: 78 BPM | SYSTOLIC BLOOD PRESSURE: 124 MMHG | TEMPERATURE: 98 F | DIASTOLIC BLOOD PRESSURE: 74 MMHG

## 2024-10-24 VITALS
TEMPERATURE: 99 F | RESPIRATION RATE: 18 BRPM | SYSTOLIC BLOOD PRESSURE: 124 MMHG | HEIGHT: 68 IN | BODY MASS INDEX: 24.4 KG/M2 | OXYGEN SATURATION: 100 % | WEIGHT: 161 LBS | HEART RATE: 76 BPM | DIASTOLIC BLOOD PRESSURE: 76 MMHG

## 2024-10-24 DIAGNOSIS — R60.9 EDEMA, UNSPECIFIED: ICD-10-CM

## 2024-10-24 DIAGNOSIS — C22.1 INTRAHEPATIC BILE DUCT CARCINOMA: ICD-10-CM

## 2024-10-24 DIAGNOSIS — D64.9 ANEMIA, UNSPECIFIED: ICD-10-CM

## 2024-10-24 LAB
ALBUMIN SERPL ELPH-MCNC: 3.1 G/DL
ALP BLD-CCNC: 151 U/L
ALT SERPL-CCNC: 8 U/L
ANION GAP SERPL CALC-SCNC: -2 MMOL/L
AST SERPL-CCNC: 26 U/L
BILIRUB SERPL-MCNC: 0.6 MG/DL
BUN SERPL-MCNC: 19 MG/DL
CALCIUM SERPL-MCNC: 9.3 MG/DL
CHLORIDE SERPL-SCNC: 109 MMOL/L
CO2 SERPL-SCNC: 28 MMOL/L
CREAT SERPL-MCNC: 0.8 MG/DL
EGFR: 76 ML/MIN/1.73M2
GLUCOSE SERPL-MCNC: 138 MG/DL
HCT VFR BLD CALC: 27.6 %
HGB BLD-MCNC: 9.1 G/DL
LYMPHOCYTES # BLD AUTO: 0.7 K/UL
LYMPHOCYTES NFR BLD AUTO: 19 %
MAN DIFF?: NO
MCHC RBC-ENTMCNC: 32.6 PG
MCHC RBC-ENTMCNC: 33 GM/DL
MCV RBC AUTO: 98.9 FL
NEUTROPHILS # BLD AUTO: 2.6 K/UL
NEUTROPHILS NFR BLD AUTO: 70.2 %
PLATELET # BLD AUTO: 200 K/UL
POTASSIUM SERPL-SCNC: 4.9 MMOL/L
PROT SERPL-MCNC: 6.2 G/DL
RBC # BLD: 2.79 M/UL
RBC # FLD: 17.3 %
SODIUM SERPL-SCNC: 135 MMOL/L
WBC # FLD AUTO: 3.7 K/UL

## 2024-10-24 PROCEDURE — 99215 OFFICE O/P EST HI 40 MIN: CPT | Mod: 25

## 2024-10-24 PROCEDURE — 36415 COLL VENOUS BLD VENIPUNCTURE: CPT

## 2024-10-24 RX ORDER — DURVALUMAB 120 MG/2.4ML
1500 INJECTION, SOLUTION INTRAVENOUS ONCE
Refills: 0 | Status: COMPLETED | OUTPATIENT
Start: 2024-10-24 | End: 2024-10-24

## 2024-10-24 RX ORDER — CISPLATIN 50 MG/50ML
35 INJECTION, SOLUTION INTRAVENOUS ONCE
Refills: 0 | Status: COMPLETED | OUTPATIENT
Start: 2024-10-24 | End: 2024-10-24

## 2024-10-24 RX ORDER — FUROSEMIDE 20 MG/1
20 TABLET ORAL
Qty: 30 | Refills: 0 | Status: ACTIVE | COMMUNITY
Start: 2024-10-24 | End: 1900-01-01

## 2024-10-24 RX ORDER — GEMCITABINE HYDROCHLORIDE 200 MG/5.26ML
1490 INJECTION INTRAVENOUS ONCE
Refills: 0 | Status: COMPLETED | OUTPATIENT
Start: 2024-10-24 | End: 2024-10-24

## 2024-10-24 RX ADMIN — GEMCITABINE HYDROCHLORIDE 1490 MILLIGRAM(S): 200 INJECTION INTRAVENOUS at 14:20

## 2024-10-24 RX ADMIN — Medication 507 MILLILITER(S): at 14:20

## 2024-10-24 RX ADMIN — Medication 1000 MILLILITER(S): at 16:20

## 2024-10-24 RX ADMIN — FOSAPREPITANT 150 MILLIGRAM(S): 150 INJECTION, POWDER, LYOPHILIZED, FOR SOLUTION INTRAVENOUS at 10:30

## 2024-10-24 RX ADMIN — SODIUM CHLORIDE 1000 MILLILITER(S): 9 INJECTION, SOLUTION INTRAMUSCULAR; INTRAVENOUS; SUBCUTANEOUS at 09:00

## 2024-10-24 RX ADMIN — CISPLATIN 35 MILLIGRAM(S): 50 INJECTION, SOLUTION INTRAVENOUS at 12:20

## 2024-10-24 RX ADMIN — PALONOSETRON HYDROCHLORIDE 0.25 MILLIGRAM(S): 0.05 INJECTION INTRAVENOUS at 10:40

## 2024-10-24 RX ADMIN — DURVALUMAB 1500 MILLIGRAM(S): 120 INJECTION, SOLUTION INTRAVENOUS at 12:00

## 2024-10-24 RX ADMIN — CISPLATIN 35 MILLIGRAM(S): 50 INJECTION, SOLUTION INTRAVENOUS at 13:50

## 2024-10-24 RX ADMIN — SODIUM CHLORIDE 10 MILLILITER(S): 9 INJECTION, SOLUTION INTRAMUSCULAR; INTRAVENOUS; SUBCUTANEOUS at 16:50

## 2024-10-24 RX ADMIN — DEXAMETHASONE 1.5 MG 10 MILLIGRAM(S): 1.5 TABLET ORAL at 09:50

## 2024-10-24 RX ADMIN — FOSAPREPITANT 500 MILLIGRAM(S): 150 INJECTION, POWDER, LYOPHILIZED, FOR SOLUTION INTRAVENOUS at 09:55

## 2024-10-24 RX ADMIN — DURVALUMAB 1500 MILLIGRAM(S): 120 INJECTION, SOLUTION INTRAVENOUS at 10:50

## 2024-10-24 RX ADMIN — DEXAMETHASONE 1.5 MG 204 MILLIGRAM(S): 1.5 TABLET ORAL at 09:30

## 2024-10-24 RX ADMIN — SODIUM CHLORIDE 500 MILLILITER(S): 9 INJECTION, SOLUTION INTRAMUSCULAR; INTRAVENOUS; SUBCUTANEOUS at 09:30

## 2024-10-24 RX ADMIN — GEMCITABINE HYDROCHLORIDE 1490 MILLIGRAM(S): 200 INJECTION INTRAVENOUS at 13:50

## 2024-10-25 LAB — TSH SERPL-ACNC: 2.33 UIU/ML

## 2024-10-30 ENCOUNTER — NON-APPOINTMENT (OUTPATIENT)
Age: 79
End: 2024-10-30

## 2024-11-01 ENCOUNTER — EMERGENCY (EMERGENCY)
Facility: HOSPITAL | Age: 79
LOS: 1 days | Discharge: HOME | End: 2024-11-01
Attending: STUDENT IN AN ORGANIZED HEALTH CARE EDUCATION/TRAINING PROGRAM | Admitting: STUDENT IN AN ORGANIZED HEALTH CARE EDUCATION/TRAINING PROGRAM
Payer: MEDICARE

## 2024-11-01 ENCOUNTER — NON-APPOINTMENT (OUTPATIENT)
Age: 79
End: 2024-11-01

## 2024-11-01 VITALS
HEIGHT: 68 IN | TEMPERATURE: 98 F | DIASTOLIC BLOOD PRESSURE: 89 MMHG | WEIGHT: 158.07 LBS | RESPIRATION RATE: 18 BRPM | HEART RATE: 107 BPM | OXYGEN SATURATION: 96 % | SYSTOLIC BLOOD PRESSURE: 143 MMHG

## 2024-11-01 VITALS
SYSTOLIC BLOOD PRESSURE: 121 MMHG | TEMPERATURE: 100 F | OXYGEN SATURATION: 98 % | DIASTOLIC BLOOD PRESSURE: 75 MMHG | HEART RATE: 88 BPM | RESPIRATION RATE: 17 BRPM

## 2024-11-01 DIAGNOSIS — Z88.5 ALLERGY STATUS TO NARCOTIC AGENT: ICD-10-CM

## 2024-11-01 DIAGNOSIS — C22.1 INTRAHEPATIC BILE DUCT CARCINOMA: ICD-10-CM

## 2024-11-01 DIAGNOSIS — R10.13 EPIGASTRIC PAIN: ICD-10-CM

## 2024-11-01 DIAGNOSIS — R50.9 FEVER, UNSPECIFIED: ICD-10-CM

## 2024-11-01 DIAGNOSIS — R14.0 ABDOMINAL DISTENSION (GASEOUS): ICD-10-CM

## 2024-11-01 LAB
ALBUMIN SERPL ELPH-MCNC: 3.6 G/DL — SIGNIFICANT CHANGE UP (ref 3.3–5)
ALP SERPL-CCNC: 181 U/L — HIGH (ref 40–120)
ALT FLD-CCNC: 15 U/L — SIGNIFICANT CHANGE UP (ref 10–45)
ANION GAP SERPL CALC-SCNC: 9 MMOL/L — SIGNIFICANT CHANGE UP (ref 5–17)
APPEARANCE UR: CLEAR — SIGNIFICANT CHANGE UP
AST SERPL-CCNC: 35 U/L — SIGNIFICANT CHANGE UP (ref 10–40)
BASOPHILS # BLD AUTO: 0.02 K/UL — SIGNIFICANT CHANGE UP (ref 0–0.2)
BASOPHILS NFR BLD AUTO: 0.3 % — SIGNIFICANT CHANGE UP (ref 0–2)
BILIRUB DIRECT SERPL-MCNC: <0.2 MG/DL — SIGNIFICANT CHANGE UP (ref 0–0.3)
BILIRUB INDIRECT FLD-MCNC: SIGNIFICANT CHANGE UP MG/DL (ref 0.2–1)
BILIRUB SERPL-MCNC: 0.2 MG/DL — SIGNIFICANT CHANGE UP (ref 0.2–1.2)
BILIRUB UR-MCNC: NEGATIVE — SIGNIFICANT CHANGE UP
BUN SERPL-MCNC: 18 MG/DL — SIGNIFICANT CHANGE UP (ref 7–23)
CALCIUM SERPL-MCNC: 9.4 MG/DL — SIGNIFICANT CHANGE UP (ref 8.4–10.5)
CHLORIDE SERPL-SCNC: 96 MMOL/L — SIGNIFICANT CHANGE UP (ref 96–108)
CO2 SERPL-SCNC: 24 MMOL/L — SIGNIFICANT CHANGE UP (ref 22–31)
COLOR SPEC: YELLOW — SIGNIFICANT CHANGE UP
CREAT SERPL-MCNC: 0.84 MG/DL — SIGNIFICANT CHANGE UP (ref 0.5–1.3)
DIFF PNL FLD: NEGATIVE — SIGNIFICANT CHANGE UP
EGFR: 72 ML/MIN/1.73M2 — SIGNIFICANT CHANGE UP
EOSINOPHIL # BLD AUTO: 0 K/UL — SIGNIFICANT CHANGE UP (ref 0–0.5)
EOSINOPHIL NFR BLD AUTO: 0 % — SIGNIFICANT CHANGE UP (ref 0–6)
GLUCOSE SERPL-MCNC: 108 MG/DL — HIGH (ref 70–99)
GLUCOSE UR QL: NEGATIVE MG/DL — SIGNIFICANT CHANGE UP
HCT VFR BLD CALC: 27.1 % — LOW (ref 34.5–45)
HGB BLD-MCNC: 8.8 G/DL — LOW (ref 11.5–15.5)
IMM GRANULOCYTES NFR BLD AUTO: 1.4 % — HIGH (ref 0–0.9)
KETONES UR-MCNC: NEGATIVE MG/DL — SIGNIFICANT CHANGE UP
LEUKOCYTE ESTERASE UR-ACNC: NEGATIVE — SIGNIFICANT CHANGE UP
LIDOCAIN IGE QN: 28 U/L — SIGNIFICANT CHANGE UP (ref 7–60)
LYMPHOCYTES # BLD AUTO: 0.75 K/UL — LOW (ref 1–3.3)
LYMPHOCYTES # BLD AUTO: 9.5 % — LOW (ref 13–44)
MCHC RBC-ENTMCNC: 32.1 PG — SIGNIFICANT CHANGE UP (ref 27–34)
MCHC RBC-ENTMCNC: 32.5 G/DL — SIGNIFICANT CHANGE UP (ref 32–36)
MCV RBC AUTO: 98.9 FL — SIGNIFICANT CHANGE UP (ref 80–100)
MONOCYTES # BLD AUTO: 1.14 K/UL — HIGH (ref 0–0.9)
MONOCYTES NFR BLD AUTO: 14.4 % — HIGH (ref 2–14)
NEUTROPHILS # BLD AUTO: 5.91 K/UL — SIGNIFICANT CHANGE UP (ref 1.8–7.4)
NEUTROPHILS NFR BLD AUTO: 74.4 % — SIGNIFICANT CHANGE UP (ref 43–77)
NITRITE UR-MCNC: NEGATIVE — SIGNIFICANT CHANGE UP
NRBC # BLD: 0 /100 WBCS — SIGNIFICANT CHANGE UP (ref 0–0)
PH UR: 7.5 — SIGNIFICANT CHANGE UP (ref 5–8)
PLATELET # BLD AUTO: 205 K/UL — SIGNIFICANT CHANGE UP (ref 150–400)
POTASSIUM SERPL-MCNC: 4.6 MMOL/L — SIGNIFICANT CHANGE UP (ref 3.5–5.3)
POTASSIUM SERPL-SCNC: 4.6 MMOL/L — SIGNIFICANT CHANGE UP (ref 3.5–5.3)
PROT SERPL-MCNC: 7.4 G/DL — SIGNIFICANT CHANGE UP (ref 6–8.3)
PROT UR-MCNC: SIGNIFICANT CHANGE UP MG/DL
RBC # BLD: 2.74 M/UL — LOW (ref 3.8–5.2)
RBC # FLD: 15.5 % — HIGH (ref 10.3–14.5)
SODIUM SERPL-SCNC: 129 MMOL/L — LOW (ref 135–145)
SP GR SPEC: 1.02 — SIGNIFICANT CHANGE UP (ref 1–1.03)
UROBILINOGEN FLD QL: 0.2 MG/DL — SIGNIFICANT CHANGE UP (ref 0.2–1)
WBC # BLD: 7.93 K/UL — SIGNIFICANT CHANGE UP (ref 3.8–10.5)
WBC # FLD AUTO: 7.93 K/UL — SIGNIFICANT CHANGE UP (ref 3.8–10.5)

## 2024-11-01 PROCEDURE — 99285 EMERGENCY DEPT VISIT HI MDM: CPT

## 2024-11-01 PROCEDURE — 74177 CT ABD & PELVIS W/CONTRAST: CPT | Mod: 26,MC

## 2024-11-01 RX ORDER — AMOXICILLIN AND CLAVULANATE POTASSIUM 600; 42.9 MG/5ML; MG/5ML
875 POWDER, FOR SUSPENSION ORAL
Qty: 14 | Refills: 0
Start: 2024-11-01 | End: 2024-11-07

## 2024-11-01 RX ORDER — IOHEXOL 300 MG/ML
30 INJECTION, SOLUTION INTRAVENOUS ONCE
Refills: 0 | Status: COMPLETED | OUTPATIENT
Start: 2024-11-01 | End: 2024-11-01

## 2024-11-01 RX ADMIN — IOHEXOL 30 MILLILITER(S): 300 INJECTION, SOLUTION INTRAVENOUS at 16:10

## 2024-11-01 NOTE — ED PROVIDER NOTE - PHYSICAL EXAMINATION
General: comfortable, resting in ED  HEENT: atraumatic, no eye erythema or discharge  Pulm: no cyanosis, no added work of breathing  Cardiac: no pallor, intact peripheral pulse  GI: mild abdominal distension, nontender to deep palpation in all 4 quadrants  Neuro: alert, conversant  Psych: neutral affect, cooperative  Msk: no gross deformity or instability  Skin: no erythema or rash

## 2024-11-01 NOTE — ED ADULT TRIAGE NOTE - PAIN RATING/NUMBER SCALE (0-10): ACTIVITY
Normal coronaries by cath in 2017. Mild coronary calcifications seen on CT in September. 1 episode of angina. No arrhythmia. Remains on ASA and statin therapy   0 (no pain/absence of nonverbal indicators of pain)

## 2024-11-01 NOTE — ED PROVIDER NOTE - PATIENT PORTAL LINK FT
You can access the FollowMyHealth Patient Portal offered by [facility] by registering at the following website: [email or web address]. By joining [facility]’s FollowMyHealth portal, you will also be able to view your health information using other applications (apps) compatible with our system.

## 2024-11-01 NOTE — ED ADULT NURSE NOTE - OBJECTIVE STATEMENT
pt presented to the ED for chemo complications. the pt stated she begin to feel bad after starting chemotherapy treatment

## 2024-11-01 NOTE — ED PROVIDER NOTE - NS ED MD DISPO DISCHARGE
Nutrition Care Plan    Nutrition Diagnosis:   Increased nutrient needs  related to prematurity, developing oral skills as evidenced by born at 34w6d (now 65w2d, DOL 10), estimated needs, consumed 300ml/85% via bottle in past 24 hours analyzed.    Nutrition Prescription:  Energy Goal: 115-125 kcal/kg/day  Fluid Goal: 155-160 ml/kg/day  Protein Goal: 3.0-3.2 g/kg/day  Calcium Goal: 120-220 mg kg/day  Phosphorus Goal:  mg/kg/day  Iron Goal: 2-4 mg/kg/day  Vitamin D Goal: 400-600 International Units/day  Growth Goal: Gain 215 g/week or ~31g/day, 1-1.1 cm in length/week; 0.7-1 cm in OFC/week   - Goal for weight and length growth measures: maintain z score change <0.8 from birth after DOL 14    Intervention:  General/healthful diet:     · Continue Human Milk 22 kcal/ounce or Similac Neosure 22 for feedings.   · Continue to fortify Human Milk with Similac Human Milk Fortifier Hydrolyzed Protein Concentrated Liquid in the hospital.   · Suggest fortify Human Milk with Similac Neosure powder after discharge.   · Anticipate above appropriate for discharge if growth and volume goals are met.  · Support and encourage mom with pumping (and breast feeding if she desires)     Multivitamin/mineral:     · Recommend for hospital:   · Start 0.75 ml poly-vi-sol with iron daily   · Recommend for discharge:  · If continue to receive ~% of intake from human milk feeds, 1 ml Poly-vi-sol with Iron Daily   · If receives <50% of intake from human milk feeds, 0.5 ml Poly-vi-sol with Iron Daily     Monitoring and Evaluation:  Breast milk intake:    Infant formula intake:   Goal to meet macronutrient needs with fortified human milk,  formula (if not available), and possibly breast feeding if mom desires.    • Past 24 hour intake (2845-1456): 113 kcal/kg, 154 ml/kg, and 3.2 g pro/kg provided from (208ml) neosure 22 formula and ~22 kcal/ounce fortified maternal milk (236 ml).  o Met ~100% kcal, 100% protein goals.    · Last  documented breast attempt 4/11.    D (3):    Calcium (1):    Phosphorous (6):   Goal to meet needs to support bone development due to prematurity.   • Past 24 hour intake: 98 mg Calcium/kg, 69 mg PO4/kg, 169 international units vitamin D provided from nutrition above   o Met 100% calcium, PO4 goals. Met 42% Vitamin D goal    Desired growth pattern:   Achieve growth velocity as established above.  See [employer] Growth Charts (growth graph activity) for details.  · Weight:   · Birth: 2240g at z score: -0.51  · Current: 2305g at z score: -1.06.   · Passed birth weight at Day of Life 5 (WDL). Net gain: 45g (9g/day) since.   ·  Length: Noted discrepancy from birth to current - will follow.   · OFC: 1 cm gain x birth - now above birth percentile/z score     Home

## 2024-11-01 NOTE — ED PROVIDER NOTE - NSFOLLOWUPCLINICS_GEN_ALL_ED_FT
[facility]  Family Medicine  [address]  [place], [place] [address]  Phone: [phone]  Fax:

## 2024-11-01 NOTE — ED ADULT TRIAGE NOTE - CHIEF COMPLAINT QUOTE
Pt presents to ED here for low WBC. Pt is chemo pt last chemo 10/24 for cholangio carcinoma. Pt A&Ox4, conversive in full sentences. Pt has port on the R chest wall. Denies CP or SOB.

## 2024-11-01 NOTE — ED ADULT NURSE NOTE - NSFALLUNIVINTERV_ED_ALL_ED
Bed/Stretcher in lowest position, wheels locked, appropriate side rails in place/Call bell, personal items and telephone in reach/Instruct patient to call for assistance before getting out of bed/chair/stretcher/Non-slip footwear applied when patient is off stretcher/[place] to call system/Physically safe environment - no spills, clutter or unnecessary equipment/Purposeful proactive rounding/Room/bathroom lighting operational, light cord in reach

## 2024-11-01 NOTE — ED PROVIDER NOTE - OBJECTIVE STATEMENT
79F with cholangiocarcinoma on chemo via R chest port, followed by Dr. Arvizu, now with 3 days of epigastric abdominal pain associated with fever 101.5 and chills.  Has not had any vomiting or diarrhea.  Baseline rhinorrhea from seasonal allergies with no cough/sore throat.  Multiple negative home covid tests.  History of intraabdominal cancer resection.    Became concerned that the symptoms were from neutropenia, however did not receive diagnosis of neutropenia or any lab testing to suggest neutropenia. 79F with cholangiocarcinoma on chemo via R chest port, followed by Dr. Arvizu, now with 3 days of epigastric abdominal pain associated with fever 101.5 and chills.  Has not had any vomiting or diarrhea.  Baseline rhinorrhea from seasonal allergies with no cough/sore throat.  Multiple negative home covid tests.  History of intraabdominal cancer resection.    Became concerned that the symptoms were from neutropenia, however did not receive diagnosis of neutropenia or any lab testing to suggest neutropenia.    Currently afebrile and has not taken any tylenol or motrin today.

## 2024-11-01 NOTE — ED PROVIDER NOTE - CLINICAL SUMMARY MEDICAL DECISION MAKING FREE TEXT BOX
Concern for abdominal pain of unclear etiology.  Will screen for hepatobiliary disease and pancreatitis on labs.  R/o acute intra-abdominal process ?sbo ?perforation ?appendicitis ?diverticulitis ?intussusception ?volvulus via CTAP given patient's age, hx abdominal surgery, and recent fever.  Gastritis or GERD as diagnosis of exclusion.

## 2024-11-01 NOTE — ED PROVIDER NOTE - PROGRESS NOTE DETAILS
Discussed case with Dr. Arvizu, additionally recommend blood cultures given patient with port, urinalysis, and if no acute findings in ED discharge on 1 week of empiric antibiotic cultures. CT and labs without acute process.  Plan to discharge home with return precautions and outpatient followup.

## 2024-11-04 ENCOUNTER — NON-APPOINTMENT (OUTPATIENT)
Age: 79
End: 2024-11-04

## 2024-11-05 ENCOUNTER — NON-APPOINTMENT (OUTPATIENT)
Age: 79
End: 2024-11-05

## 2024-11-06 LAB
CULTURE RESULTS: SIGNIFICANT CHANGE UP
CULTURE RESULTS: SIGNIFICANT CHANGE UP
SPECIMEN SOURCE: SIGNIFICANT CHANGE UP
SPECIMEN SOURCE: SIGNIFICANT CHANGE UP

## 2024-11-07 ENCOUNTER — APPOINTMENT (OUTPATIENT)
Dept: INFUSION THERAPY | Facility: CLINIC | Age: 79
End: 2024-11-07

## 2024-11-08 ENCOUNTER — NON-APPOINTMENT (OUTPATIENT)
Age: 79
End: 2024-11-08

## 2024-11-08 ENCOUNTER — APPOINTMENT (OUTPATIENT)
Dept: HEMATOLOGY ONCOLOGY | Facility: CLINIC | Age: 79
End: 2024-11-08
Payer: MEDICARE

## 2024-11-08 ENCOUNTER — LABORATORY RESULT (OUTPATIENT)
Age: 79
End: 2024-11-08

## 2024-11-08 VITALS
RESPIRATION RATE: 18 BRPM | HEART RATE: 98 BPM | DIASTOLIC BLOOD PRESSURE: 83 MMHG | OXYGEN SATURATION: 97 % | HEIGHT: 68 IN | SYSTOLIC BLOOD PRESSURE: 135 MMHG | BODY MASS INDEX: 23.64 KG/M2 | WEIGHT: 156 LBS | TEMPERATURE: 97.6 F

## 2024-11-08 DIAGNOSIS — T45.1X5A OTHER FATIGUE: ICD-10-CM

## 2024-11-08 DIAGNOSIS — R50.9 FEVER, UNSPECIFIED: ICD-10-CM

## 2024-11-08 DIAGNOSIS — R53.83 OTHER FATIGUE: ICD-10-CM

## 2024-11-08 DIAGNOSIS — D64.9 ANEMIA, UNSPECIFIED: ICD-10-CM

## 2024-11-08 DIAGNOSIS — C22.1 INTRAHEPATIC BILE DUCT CARCINOMA: ICD-10-CM

## 2024-11-08 LAB
ALBUMIN SERPL ELPH-MCNC: 2.8 G/DL
ALP BLD-CCNC: 136 U/L
ALT SERPL-CCNC: 23 U/L
ANION GAP SERPL CALC-SCNC: 3 MMOL/L
AST SERPL-CCNC: 43 U/L
BASOPHILS # BLD AUTO: 0.1 K/UL
BASOPHILS NFR BLD AUTO: 1 %
BILIRUB SERPL-MCNC: 0.5 MG/DL
BUN SERPL-MCNC: 16 MG/DL
CALCIUM SERPL-MCNC: 9.4 MG/DL
CHLORIDE SERPL-SCNC: 100 MMOL/L
CK SERPL-CCNC: 21 U/L
CO2 SERPL-SCNC: 27 MMOL/L
CORTIS SERPL-MCNC: 24.93 UG/DL
CREAT SERPL-MCNC: 0.6 MG/DL
CRP SERPL-MCNC: 107.4 MG/L
EGFR: 92 ML/MIN/1.73M2
ERYTHROCYTE [SEDIMENTATION RATE] IN BLOOD BY WESTERGREN METHOD: 122 MM/HR
FERRITIN SERPL-MCNC: 863 NG/ML
GLUCOSE SERPL-MCNC: 132 MG/DL
HCT VFR BLD CALC: 27 %
HGB BLD-MCNC: 8.8 G/DL
IRON SATN MFR SERPL: 9 %
IRON SERPL-MCNC: 15 UG/DL
LDH SERPL-CCNC: 187 U/L
LYMPHOCYTES # BLD AUTO: 0.3 K/UL
LYMPHOCYTES NFR BLD AUTO: 3 %
MAGNESIUM SERPL-MCNC: 1.9 MG/DL
MAN DIFF?: YES
MCHC RBC-ENTMCNC: 32.1 PG
MCHC RBC-ENTMCNC: 32.6 G/DL
MCV RBC AUTO: 98.5 FL
MONOCYTES # BLD AUTO: 1.7 K/UL
MONOCYTES NFR BLD AUTO: 16 %
NEUTROPHILS # BLD AUTO: 8.56 K/UL
NEUTROPHILS NFR BLD AUTO: 80 %
PLATELET # BLD AUTO: 378 K/UL
POTASSIUM SERPL-SCNC: 4.7 MMOL/L
PROT SERPL-MCNC: 7.2 G/DL
RBC # BLD: 2.74 M/UL
RBC # FLD: 15.2 %
SODIUM SERPL-SCNC: 130 MMOL/L
TIBC SERPL-MCNC: 165 UG/DL
UIBC SERPL-MCNC: 150 UG/DL
WBC # FLD AUTO: 10.7 K/UL

## 2024-11-08 PROCEDURE — 36415 COLL VENOUS BLD VENIPUNCTURE: CPT

## 2024-11-08 PROCEDURE — G2211 COMPLEX E/M VISIT ADD ON: CPT

## 2024-11-08 PROCEDURE — 99214 OFFICE O/P EST MOD 30 MIN: CPT

## 2024-11-10 LAB — AFP-TM SERPL-MCNC: 5.8 NG/ML

## 2024-11-11 PROBLEM — R50.9 FEVER, UNSPECIFIED FEVER CAUSE: Status: ACTIVE | Noted: 2024-11-11

## 2024-11-12 ENCOUNTER — INPATIENT (INPATIENT)
Facility: HOSPITAL | Age: 79
LOS: 6 days | Discharge: HOME | DRG: 436 | End: 2024-11-19
Attending: INTERNAL MEDICINE | Admitting: STUDENT IN AN ORGANIZED HEALTH CARE EDUCATION/TRAINING PROGRAM
Payer: MEDICARE

## 2024-11-12 VITALS
RESPIRATION RATE: 18 BRPM | OXYGEN SATURATION: 100 % | TEMPERATURE: 98 F | HEART RATE: 97 BPM | HEIGHT: 68 IN | SYSTOLIC BLOOD PRESSURE: 123 MMHG | DIASTOLIC BLOOD PRESSURE: 81 MMHG | WEIGHT: 151.9 LBS

## 2024-11-12 DIAGNOSIS — R50.9 FEVER, UNSPECIFIED: ICD-10-CM

## 2024-11-12 DIAGNOSIS — K21.9 GASTRO-ESOPHAGEAL REFLUX DISEASE WITHOUT ESOPHAGITIS: ICD-10-CM

## 2024-11-12 DIAGNOSIS — Z29.9 ENCOUNTER FOR PROPHYLACTIC MEASURES, UNSPECIFIED: ICD-10-CM

## 2024-11-12 DIAGNOSIS — E78.5 HYPERLIPIDEMIA, UNSPECIFIED: ICD-10-CM

## 2024-11-12 DIAGNOSIS — H81.20 VESTIBULAR NEURONITIS, UNSPECIFIED EAR: ICD-10-CM

## 2024-11-12 DIAGNOSIS — J45.909 UNSPECIFIED ASTHMA, UNCOMPLICATED: ICD-10-CM

## 2024-11-12 DIAGNOSIS — I10 ESSENTIAL (PRIMARY) HYPERTENSION: ICD-10-CM

## 2024-11-12 DIAGNOSIS — C22.1 INTRAHEPATIC BILE DUCT CARCINOMA: ICD-10-CM

## 2024-11-12 LAB
ALBUMIN SERPL ELPH-MCNC: 3.4 G/DL — SIGNIFICANT CHANGE UP (ref 3.3–5)
ALP SERPL-CCNC: 226 U/L — HIGH (ref 40–120)
ALT FLD-CCNC: 38 U/L — SIGNIFICANT CHANGE UP (ref 10–45)
ANION GAP SERPL CALC-SCNC: 7 MMOL/L — SIGNIFICANT CHANGE UP (ref 5–17)
APPEARANCE UR: CLEAR — SIGNIFICANT CHANGE UP
AST SERPL-CCNC: 47 U/L — HIGH (ref 10–40)
BASOPHILS # BLD AUTO: 0 K/UL — SIGNIFICANT CHANGE UP (ref 0–0.2)
BASOPHILS NFR BLD AUTO: 0 % — SIGNIFICANT CHANGE UP (ref 0–2)
BILIRUB SERPL-MCNC: 0.2 MG/DL — SIGNIFICANT CHANGE UP (ref 0.2–1.2)
BILIRUB UR-MCNC: NEGATIVE — SIGNIFICANT CHANGE UP
BUN SERPL-MCNC: 17 MG/DL — SIGNIFICANT CHANGE UP (ref 7–23)
CALCIUM SERPL-MCNC: 9.4 MG/DL — SIGNIFICANT CHANGE UP (ref 8.4–10.5)
CHLORIDE SERPL-SCNC: 99 MMOL/L — SIGNIFICANT CHANGE UP (ref 96–108)
CO2 SERPL-SCNC: 27 MMOL/L — SIGNIFICANT CHANGE UP (ref 22–31)
COLOR SPEC: YELLOW — SIGNIFICANT CHANGE UP
CREAT SERPL-MCNC: 0.75 MG/DL — SIGNIFICANT CHANGE UP (ref 0.5–1.3)
DIFF PNL FLD: NEGATIVE — SIGNIFICANT CHANGE UP
EGFR: 82 ML/MIN/1.73M2 — SIGNIFICANT CHANGE UP
EOSINOPHIL # BLD AUTO: 0 K/UL — SIGNIFICANT CHANGE UP (ref 0–0.5)
EOSINOPHIL NFR BLD AUTO: 0 % — SIGNIFICANT CHANGE UP (ref 0–6)
FLUAV AG NPH QL: SIGNIFICANT CHANGE UP
FLUBV AG NPH QL: SIGNIFICANT CHANGE UP
GLUCOSE SERPL-MCNC: 118 MG/DL — HIGH (ref 70–99)
GLUCOSE UR QL: NEGATIVE MG/DL — SIGNIFICANT CHANGE UP
HAV IGM SER-ACNC: SIGNIFICANT CHANGE UP
HBV CORE IGM SER-ACNC: SIGNIFICANT CHANGE UP
HBV SURFACE AG SER-ACNC: SIGNIFICANT CHANGE UP
HCT VFR BLD CALC: 29 % — LOW (ref 34.5–45)
HCV AB S/CO SERPL IA: 0.05 S/CO — SIGNIFICANT CHANGE UP
HCV AB SERPL-IMP: SIGNIFICANT CHANGE UP
HETEROPH AB TITR SER AGGL: NEGATIVE — SIGNIFICANT CHANGE UP
HGB BLD-MCNC: 9.2 G/DL — LOW (ref 11.5–15.5)
HIV 1+2 AB+HIV1 P24 AG SERPL QL IA: SIGNIFICANT CHANGE UP
KETONES UR-MCNC: NEGATIVE MG/DL — SIGNIFICANT CHANGE UP
LACTATE SERPL-SCNC: 1.2 MMOL/L — SIGNIFICANT CHANGE UP (ref 0.5–2)
LEUKOCYTE ESTERASE UR-ACNC: ABNORMAL
LYMPHOCYTES # BLD AUTO: 0.92 K/UL — LOW (ref 1–3.3)
LYMPHOCYTES # BLD AUTO: 9.6 % — LOW (ref 13–44)
MCHC RBC-ENTMCNC: 31.7 G/DL — LOW (ref 32–36)
MCHC RBC-ENTMCNC: 32.2 PG — SIGNIFICANT CHANGE UP (ref 27–34)
MCV RBC AUTO: 101.4 FL — HIGH (ref 80–100)
MONOCYTES # BLD AUTO: 0.25 K/UL — SIGNIFICANT CHANGE UP (ref 0–0.9)
MONOCYTES NFR BLD AUTO: 2.6 % — SIGNIFICANT CHANGE UP (ref 2–14)
NEUTROPHILS # BLD AUTO: 8.26 K/UL — HIGH (ref 1.8–7.4)
NEUTROPHILS NFR BLD AUTO: 86 % — HIGH (ref 43–77)
NITRITE UR-MCNC: NEGATIVE — SIGNIFICANT CHANGE UP
PH UR: 6.5 — SIGNIFICANT CHANGE UP (ref 5–8)
PLATELET # BLD AUTO: 427 K/UL — HIGH (ref 150–400)
POTASSIUM SERPL-MCNC: 4.9 MMOL/L — SIGNIFICANT CHANGE UP (ref 3.5–5.3)
POTASSIUM SERPL-SCNC: 4.9 MMOL/L — SIGNIFICANT CHANGE UP (ref 3.5–5.3)
PROT SERPL-MCNC: 7.4 G/DL — SIGNIFICANT CHANGE UP (ref 6–8.3)
PROT UR-MCNC: SIGNIFICANT CHANGE UP MG/DL
RBC # BLD: 2.86 M/UL — LOW (ref 3.8–5.2)
RBC # FLD: 15.6 % — HIGH (ref 10.3–14.5)
RSV RNA NPH QL NAA+NON-PROBE: SIGNIFICANT CHANGE UP
SARS-COV-2 RNA SPEC QL NAA+PROBE: SIGNIFICANT CHANGE UP
SODIUM SERPL-SCNC: 133 MMOL/L — LOW (ref 135–145)
SP GR SPEC: <1.005 — LOW (ref 1–1.03)
TSH SERPL-MCNC: 0.59 UIU/ML — SIGNIFICANT CHANGE UP (ref 0.27–4.2)
UROBILINOGEN FLD QL: 1 MG/DL — SIGNIFICANT CHANGE UP (ref 0.2–1)
WBC # BLD: 9.6 K/UL — SIGNIFICANT CHANGE UP (ref 3.8–10.5)
WBC # FLD AUTO: 9.6 K/UL — SIGNIFICANT CHANGE UP (ref 3.8–10.5)

## 2024-11-12 PROCEDURE — 93975 VASCULAR STUDY: CPT | Mod: 26

## 2024-11-12 PROCEDURE — 93010 ELECTROCARDIOGRAM REPORT: CPT

## 2024-11-12 PROCEDURE — 99223 1ST HOSP IP/OBS HIGH 75: CPT

## 2024-11-12 PROCEDURE — 99285 EMERGENCY DEPT VISIT HI MDM: CPT

## 2024-11-12 PROCEDURE — 76705 ECHO EXAM OF ABDOMEN: CPT | Mod: 26,XU

## 2024-11-12 PROCEDURE — 71046 X-RAY EXAM CHEST 2 VIEWS: CPT | Mod: 26

## 2024-11-12 RX ORDER — AMLODIPINE BESYLATE 10 MG/1
2.5 TABLET ORAL DAILY
Refills: 0 | Status: DISCONTINUED | OUTPATIENT
Start: 2024-11-12 | End: 2024-11-19

## 2024-11-12 RX ORDER — MONTELUKAST SODIUM 10 MG/1
10 TABLET ORAL DAILY
Refills: 0 | Status: DISCONTINUED | OUTPATIENT
Start: 2024-11-12 | End: 2024-11-19

## 2024-11-12 RX ORDER — MONTELUKAST SODIUM 10 MG/1
1 TABLET ORAL
Refills: 0 | DISCHARGE

## 2024-11-12 RX ORDER — OMEPRAZOLE 20 MG/1
1 CAPSULE, DELAYED RELEASE ORAL
Refills: 0 | DISCHARGE

## 2024-11-12 RX ORDER — IBUPROFEN 200 MG
200 TABLET ORAL ONCE
Refills: 0 | Status: COMPLETED | OUTPATIENT
Start: 2024-11-12 | End: 2024-11-12

## 2024-11-12 RX ORDER — PANTOPRAZOLE SODIUM 40 MG/1
40 TABLET, DELAYED RELEASE ORAL
Refills: 0 | Status: DISCONTINUED | OUTPATIENT
Start: 2024-11-12 | End: 2024-11-19

## 2024-11-12 RX ORDER — ENOXAPARIN SODIUM 30 MG/.3ML
40 INJECTION SUBCUTANEOUS EVERY 24 HOURS
Refills: 0 | Status: DISCONTINUED | OUTPATIENT
Start: 2024-11-12 | End: 2024-11-19

## 2024-11-12 RX ORDER — ATENOLOL 100 MG/1
25 TABLET ORAL DAILY
Refills: 0 | Status: DISCONTINUED | OUTPATIENT
Start: 2024-11-12 | End: 2024-11-19

## 2024-11-12 RX ORDER — AMLODIPINE AND BENAZEPRIL HYDROCHLORIDE 10; 40 MG/1; MG/1
1 CAPSULE ORAL
Refills: 0 | DISCHARGE

## 2024-11-12 RX ORDER — ATENOLOL 100 MG/1
1 TABLET ORAL
Refills: 0 | DISCHARGE

## 2024-11-12 RX ORDER — SODIUM CHLORIDE 9 MG/ML
1000 INJECTION, SOLUTION INTRAMUSCULAR; INTRAVENOUS; SUBCUTANEOUS ONCE
Refills: 0 | Status: COMPLETED | OUTPATIENT
Start: 2024-11-12 | End: 2024-11-12

## 2024-11-12 RX ORDER — LISINOPRIL 20 MG/1
10 TABLET ORAL DAILY
Refills: 0 | Status: DISCONTINUED | OUTPATIENT
Start: 2024-11-12 | End: 2024-11-19

## 2024-11-12 RX ADMIN — LISINOPRIL 10 MILLIGRAM(S): 20 TABLET ORAL at 22:51

## 2024-11-12 RX ADMIN — ATENOLOL 25 MILLIGRAM(S): 100 TABLET ORAL at 23:08

## 2024-11-12 RX ADMIN — Medication 200 MILLIGRAM(S): at 23:08

## 2024-11-12 RX ADMIN — Medication 10 MILLIGRAM(S): at 22:51

## 2024-11-12 RX ADMIN — SODIUM CHLORIDE 1000 MILLILITER(S): 9 INJECTION, SOLUTION INTRAMUSCULAR; INTRAVENOUS; SUBCUTANEOUS at 11:24

## 2024-11-12 RX ADMIN — MONTELUKAST SODIUM 10 MILLIGRAM(S): 10 TABLET ORAL at 22:51

## 2024-11-12 RX ADMIN — AMLODIPINE BESYLATE 2.5 MILLIGRAM(S): 10 TABLET ORAL at 22:51

## 2024-11-12 NOTE — ED ADULT NURSE NOTE - OBJECTIVE STATEMENT
pt is a 80 y/o female with liver cancer who presents to ED for persistent abdominal pain and distention and daily fevers x2 weeks. Pt had last chemo on 10/24, had to cancel other chemo appts due to having fevers - reports that her temps go up and down during the day with the highest being 101. Pt was seen here on 10/31 for abd pain and was d/c with augmentin - reports no relief or change in symptoms. Pt is concerned she has an infection and thinks she needs to be admitted because she lives at home and feels she cannot take care of herself. Complaining of headache, weakness, fatigue. denies chest pain, palpitations, trouble breathing, vomiting/diarrhea, numbness/tingling in extremities.   PMH ovarian cancer, GERD, anxiety.   Pt is A&Ox4, ambulatory, speaking in full and complete sentences at time of assessment.

## 2024-11-12 NOTE — ED PROVIDER NOTE - PROGRESS NOTE DETAILS
[place]: Hgb 9.2 L shift, Na 133, alk phos 226, AST 47, other labs grossly wnl. UA small leuk esterase 9 WBCs, unlikely source of FUO. Pt remains stable in ED. D/w heme, will admit for FUO. Updated pt.

## 2024-11-12 NOTE — ED ADULT NURSE NOTE - NSFALLUNIVINTERV_ED_ALL_ED
Bed/Stretcher in lowest position, wheels locked, appropriate side rails in place/Call bell, personal items and telephone in reach/Instruct patient to call for assistance before getting out of bed/chair/stretcher/Non-slip footwear applied when patient is off stretcher/Camp to call system/Physically safe environment - no spills, clutter or unnecessary equipment/Purposeful proactive rounding/Room/bathroom lighting operational, light cord in reach

## 2024-11-12 NOTE — H&P ADULT - HISTORY OF PRESENT ILLNESS
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 On 10/28, several days after a chemo session, she began to have episodic fevers and chills. She presented to the ED on 11/1 for these fever and was discharged w/o admission with augmentin 7d, of which she completed 5d, stopping d/t GI side effects. Workup in ED included: BCx NGTD, CTAP showing stable cholangiocarcinoma. Labs significant for hyponatremia, high Alk phos, negative UA. Tried treating fever with tylenol. Alongside these fevers, she is having moderate, episodic abd pain that moves around her abd, worsened noticably when she sits up and down. The pain has a component of "tension."    ROS + for chronic headaches, weight gain  ROS - for chest pain, dyspnea, SOB, changes in urination/bowel habits, leg swelling, recent travel, sick contacts      ED Course     123/81, 97HR, 18RR, 98.3F, 100% O2 on RA     CBC Hb 9.2, .4, Plt 427     BMP Na 133, Alk phos 226, AST 47     UA LE small, 98 WBC, Hep B/C negative, IM screen negative, RVP negative, HIV negative, TSH normal     CXR Lungs clear. Right port catheter tip at cavoatrial junction.     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 On 10/28, several days after a chemo session, she began to have episodic fevers and chills. She presented to the ED on 11/1 for these fever and was discharged w/o admission with augmentin 7d, of which she completed 5d, stopping d/t GI side effects. Workup in ED included: BCx NGTD, CTAP showing stable cholangiocarcinoma. Labs significant for hyponatremia, high Alk phos, negative UA. Tried treating fever with tylenol. Alongside these fevers, she is having moderate, episodic abd pain that moves around her abd, worsened noticably when she sits up and down. The pain has a component of "tension."    ROS + for chronic headaches, weight gain  ROS - for chest pain, dyspnea, SOB, changes in urination/bowel habits, leg swelling, recent travel, sick contacts, nausea vomiting diarrhea constipation      ED Course     123/81, 97HR, 18RR, 98.3F, 100% O2 on RA     CBC Hb 9.2, .4, Plt 427     BMP Na 133, Alk phos 226, AST 47     UA LE small, 98 WBC, Hep B/C negative, IM screen negative, RVP negative, HIV negative, TSH normal     CXR Lungs clear. Right port catheter tip at cavoatrial junction.     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 On 10/28, several days after a chemo session, she began to have episodic fevers and chills. She presented to the ED on 11/1 for these fever and was discharged w/o admission with augmentin 7d, of which she completed 5d, stopping d/t GI side effects. Workup in ED included: BCx NGTD, CTAP showing stable cholangiocarcinoma. Labs significant for hyponatremia, high Alk phos, negative UA. Tried treating fever with tylenol. Alongside these fevers, she is having moderate, episodic abd pain that moves around her abd, worsened noticeably when she sits up and down. The pain has a component of "tension."    ROS + for chronic headaches, weight gain  ROS - for chest pain, dyspnea, SOB, changes in urination/bowel habits, leg swelling, recent travel, sick contacts, nausea vomiting diarrhea constipation      ED Course     123/81, 97HR, 18RR, 98.3F, 100% O2 on RA     CBC Hb 9.2, .4, Plt 427     BMP Na 133, Alk phos 226, AST 47     UA LE small, 98 WBC, Hep B/C negative, IM screen negative, RVP negative, HIV negative, TSH normal     CXR Lungs clear. Right port catheter tip at cavoatrial junction.     Interventions: 1L NS x1

## 2024-11-12 NOTE — CONSULT NOTE ADULT - SUBJECTIVE AND OBJECTIVE BOX
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 On 10/28, several days after a chemo session, she began to have episodic fevers and chills. She presented to the ED on 11/1 for these fever and was discharged w/o admission with augmentin 7d, of which she completed 5d, stopping d/t GI side effects. Workup in ED included: BCx NGTD, CTAP showing stable cholangiocarcinoma. Labs significant for hyponatremia, high Alk phos, negative UA. Tried treating fever with tylenol. Alongside these fevers, she is having moderate, episodic abd pain that moves around her abd, worsened noticeably when she sits up and down. The pain has a component of "tension."    ED Course   123/81, 97HR, 18RR, 98.3F, 100% O2 on RA   CBC Hb 9.2, .4, Plt 427   BMP Na 133, Alk phos 226, AST 47   UA LE small, 98 WBC, Hep B/C negative, IM screen negative, RVP negative, HIV negative, TSH normal   CXR Lungs clear. Right port catheter tip at cavoatrial junction.   Interventions: 1L NS x1         Oncologic History:  1. ovarian ca - 2007- LUIS/BSO, adjuvant chemotherapy  ?  2. intrahepatic cholangiocarcinoma  --presentation: abdominal discomfort.  --CT abdomen 3/2024: Mass in L lobe of liver with portal vein obstruction. Mass measures 10 x 7 x 9 cm.  --AFP 27, CA 19-9 normal, CEA normal.  --MRI abdomen 4/2024: 11 x 7 cm mass in the L lobe. exophytic component exerting mass efffect on pancreas.  --saw surgical oncologist. Unresectable disease.  --Biopsy: adenocarcinoma. MMR intact by IHC. CK7, CK20 positive. negative CDX2, TTF1, Gata3, PAX8, ER. Favor cholangiocarcinoma.  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 TMB = 5 mut/mB, MSI-H not detected. Additional mutations in ARID1A, ASXL1, MYC, RB1.  --Y-90 radioembolization 5/1/24. 2nd dose on 6/10/24  --pt declined germline genetic testing for BAP1 mutation  --7/15/24 MR Abdomen: Since April 3, 2024, significantly decreased enhancement and slightly decreased size of intrahepatic cholangiocarcinoma in left lobe status post Y-90.  --initiated chemoimmunotherapy with gemcitabine/cisplatin/durvalumab on 8/2/24  --MRI abdomen after cycle 4 on 10/18/24 showed continued mild decreased size and more significantly decreased enhancement treated intrahepatic cholangiocarcinoma. No new suspicious finding.  ?  PMH is notable for ovarian cancer s/p LUIS/BSO followed by adjuvant platinum/taxane based chemotherapy in 2007. Vestibular neuritis  FH: Sister leukemia. Mother breast cancer. Father lymphoma. Paternal grandfather unspecified GI malignancy. Does not have children or other first degree relatives.  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 denies smoking, ETOH. no children.       PE  HEENT: Anicteric   Cardiovascular:  RRR  Respiratory: CTABL  Gastrointestinal: hepatomegaly, bs otherwise present and otherwise soft, no TTP  Extremities: no edema to LE  Neurological: AAOx3, ca grossly intact                9.2    9.60  )-----------( 427      ( 12 Nov 2024 10:52 )             29.0     11-12    133[L]  |  99  |  17  ----------------------------<  118[H]  4.9   |  27  |  0.75    Ca    9.4      12 Nov 2024 10:52    TPro  7.4  /  Alb  3.4  /  TBili  0.2  /  DBili  x   /  AST  47[H]  /  ALT  38  /  AlkPhos  226[H]  11-12      Urinalysis Basic - ( 12 Nov 2024 10:52 )    Color: x / Appearance: x / SG: x / pH: x  Gluc: 118 mg/dL / Ketone: x  / Bili: x / Urobili: x   Blood: x / Protein: x / Nitrite: x   Leuk Esterase: x / RBC: x / WBC x   Sq Epi: x / Non Sq Epi: x / Bacteria: x            Lactate, Blood: 1.2 mmol/L (11-12 @ 10:52)      RADIOLOGY, EKG & ADDITIONAL TESTS: Reviewed.

## 2024-11-12 NOTE — ED ADULT TRIAGE NOTE - CHIEF COMPLAINT QUOTE
"My last liver CA chemo was 10/24. I've been having fevers since 10/28. I've had to cancel chemo d/t fevers then came to the ER 10/31 and developed abdominal distention and pain. They put me Augmentin, took it for 5 days, had GI upset, and stopped. My doctor repeated my blood work Friday and she thinks I still have infection that the Augmentin didn't touch and thinks I may need to be admitted."

## 2024-11-12 NOTE — H&P ADULT - ASSESSMENT
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

## 2024-11-12 NOTE — H&P ADULT - PROBLEM SELECTOR PLAN 5
on home amlodipine benzapril 2.5-10mg qD, atenolol 25mg qD. continue with home meds on home amlodipine benzapril 2.5-10mg qD, atenolol 25mg qD. we don't have benzapril, so switch to lisinopril 10mg and amlodipine 2.5mg qD

## 2024-11-12 NOTE — H&P ADULT - NSHPPHYSICALEXAM_GEN_ALL_CORE
General: NAD, appears comfortable    HEENT:  PERRL, anicteric sclera  Cardiovascular:  RRR  Respiratory: CTA B/L  Gastrointestinal: hepatomegaly, bs otherwise present and otherwise soft, no TTP  Extremities: no edema to LE  Vascular: 2+ radial pulses  Neurological: AAOx3; no focal deficits  Psychiatric: pleasant mood and affect  Dermatologic: no appreciable wounds or damage to the skin None

## 2024-11-12 NOTE — H&P ADULT - PROBLEM SELECTOR PLAN 2
active malignancy, following Nolberto, seen most recently several days ago, aware of ongoing fevers. s/p embolization in May and on active chemo, last chem was end of october, next dose planned for later this month pending fever w/u    Plan  - f/u heme/onc recs

## 2024-11-12 NOTE — ED PROVIDER NOTE - OBJECTIVE STATEMENT
79F PMH vestibular neuritis, unresectable cholangiocarcinoma (chemo/immunotherapy - last chemo 10/24, R chest port, Dr. Arvizu), Ovarian cancer (2007 s/p LUIS/BSO, adjuvant chemotherapy) p/w fever.  Pt had presented to [facility] ED on 11/1 w/ 3d of abd pain and f/c. Hgb 8.8 (was 9.8), Na 129, alk phos 181 (was 166), other labs grossly wnl. UA no infection. Blood cx NGTD. CT a/p w/ oral and IV contrast showed "IMPRESSION: No significant change of the 11.1 x 7.3 hypodense heterogeneous mass centered in the left hepatic lobe, corresponding to the patient's treated cholangiocarcinoma. However, since the prior MRI of 10/18/2024, there is progressed non-enhancement of the surrounding liver parenchyma in segments 2 and 3, likely reflecting posttreatment changes. Otherwise, no bowel obstruction or inflammation." Was dc'd on empiric 7d of augmentin though only took 5 days 2/2 increased belching which she attributed to abx. Pt continues to have daily fevers for last 2 weeks, feels that she is getting increasingly weak and having increasing difficulty caring for herself. Has transient relief and improved strength w/ tylenol PRN. States she was in touch w/ Dr. Rogers who advised returning to ED for possible admission. Labs from 11/8 notable for WBC 10.7, hgb 8.8, normal neutrophils, Na 130, .4, alk phos improved to 136, albumin 2.8, AST 43. Pt continues to have mild upper abd pain and abd bloating. No other systemic symptoms.   Denies HA, URI symptoms, SOB/CP, NVD, urinary complaints, rashes, focal weakness/numbness, LE pain/swelling.

## 2024-11-12 NOTE — PATIENT PROFILE ADULT - FALL HARM RISK - HARM RISK INTERVENTIONS

## 2024-11-12 NOTE — ED PROVIDER NOTE - CLINICAL SUMMARY MEDICAL DECISION MAKING FREE TEXT BOX
79F PMH vestibular neuritis, unresectable cholangiocarcinoma (chemo/immunotherapy - last chemo 10/24, R chest port, Dr. Arvizu), Ovarian cancer (2007 s/p LUIS/BSO, adjuvant chemotherapy) p/w fever.  Pt had presented to [facility] ED on 11/1 w/ 3d of abd pain and f/c. Hgb 8.8 (was 9.8), Na 129, alk phos 181 (was 166), other labs grossly wnl. UA no infection. Blood cx NGTD. CT a/p w/ oral and IV contrast showed "IMPRESSION: No significant change of the 11.1 x 7.3 hypodense heterogeneous mass centered in the left hepatic lobe, corresponding to the patient's treated cholangiocarcinoma. However, since the prior MRI of 10/18/2024, there is progressed non-enhancement of the surrounding liver parenchyma in segments 2 and 3, likely reflecting posttreatment changes. Otherwise, no bowel obstruction or inflammation." Was dc'd on empiric 7d of augmentin though only took 5 days 2/2 increased belching which she attributed to abx. Pt continues to have daily fevers for last 2 weeks, feels that she is getting increasingly weak and having increasing difficulty caring for herself. Has transient relief and improved strength w/ tylenol PRN. States she was in touch w/ Dr. Rogers who advised returning to ED for possible admission. Labs from 11/8 notable for WBC 10.7, hgb 8.8, normal neutrophils, Na 130, .4, alk phos improved to 136, albumin 2.8, AST 43. Pt continues to have mild upper abd pain and abd bloating. No other systemic symptoms.   Vitals wnl, exam as above.   ddx: FUO. Unclear etiology. Possible infectious vs. cancer related vs. medication related. Does not meet sepsis criteria. Low suspicion neutropenia.   Labs, CXR, IVF, reassess.

## 2024-11-12 NOTE — H&P ADULT - ATTENDING COMMENTS
Ms. Cornejo is a 80yo woman with PMHx cholangiocarcinoma on active chemotherapy, prior hx of ovarian cancer, HTN, HLD who presented to the ED with several weeks of fevers, admitted for further work-up and management. Patient seen in [facility] ED 11/1 for same complaint, CT A/P unremarkable and blood cultures no growth. Given 7-day course of Augmentin, of which she took 5 days (stopped early 2/2 GI side effects). Patient has no localizing symptoms apart from abdominal pain with movement and has generalized fatigue. In the ED, afebrile, no leukocytosis, non-toxic appearing. Concern there is an underlying infection given immunocompromised state, will involve ID. Monitoring off abx given clinical stability, absence of sepsis. Low threshold to start abx should this change. Also consider tumor fever.

## 2024-11-12 NOTE — ED PROVIDER NOTE - PHYSICAL EXAMINATION
abd: soft, mild epigastric/RUQ firmness w/ minimal localized ttp, no rebound/guarding. no cVAT  chest port c/d/i

## 2024-11-12 NOTE — H&P ADULT - NSHPLABSRESULTS_GEN_ALL_CORE
LABS:                         9.2    9.60  )-----------( 427      ( 12 Nov 2024 10:52 )             29.0     11-12    133[L]  |  99  |  17  ----------------------------<  118[H]  4.9   |  27  |  0.75    Ca    9.4      12 Nov 2024 10:52    TPro  7.4  /  Alb  3.4  /  TBili  0.2  /  DBili  x   /  AST  47[H]  /  ALT  38  /  AlkPhos  226[H]  11-12      Urinalysis Basic - ( 12 Nov 2024 10:52 )    Color: x / Appearance: x / SG: x / pH: x  Gluc: 118 mg/dL / Ketone: x  / Bili: x / Urobili: x   Blood: x / Protein: x / Nitrite: x   Leuk Esterase: x / RBC: x / WBC x   Sq Epi: x / Non Sq Epi: x / Bacteria: x            Lactate, Blood: 1.2 mmol/L (11-12 @ 10:52)      RADIOLOGY, EKG & ADDITIONAL TESTS: Reviewed.

## 2024-11-12 NOTE — H&P ADULT - PROBLEM SELECTOR PLAN 1
2 wks of episodic fevers, with active cholangiocarcinoma on chemo following Wozey, with simultaneous new moderate, episodic abd pain that migrates around abd. work up in ED on 11/1 showing NGTD on BCx, negative UA, stable cholangiocarcinoma. given fever + abd pain, ddx liver abscess, sbp, cholangitis, possibly central line infection of chemo port    Plan  - RUQ US to evaluate for liver abscess  - abd US to evaluate for ascites  - f/.u further heme/onc recs 2 wks of episodic fevers, with active cholangiocarcinoma on chemo following Wozey, with simultaneous new moderate, episodic abd pain that migrates around abd, no leukocytosis but on chemo. initial ED w/u on 11/1 negative for bacteremia, UTI, progression of malignancy per imaging. given fever + abd pain, ddx liver abscess, sbp, cholangitis, possibly central line infection of chemo port. fevers refractory to 5d of augmentin.     Plan  - start empiric Zosyn 3.375mg q8h  - RUQ US to evaluate for liver abscess  - abd US to evaluate for ascites  - f/.u further heme/onc recs 2 wks of episodic fevers, with active cholangiocarcinoma on chemo following Wozey, with simultaneous new moderate, episodic abd pain that migrates around abd, no leukocytosis but on chemo. initial ED w/u on 11/1 negative for bacteremia, UTI, progression of malignancy per imaging. given fever + abd pain, ddx liver abscess, sbp, cholangitis, possibly central line infection of chemo port. possibly tumor/chemo fevers. fevers refractory to 5d of augmentin.     Plan  - monitor off ABX  - f/u RUQ US to evaluate for liver abscess  - f/u abd US to evaluate for ascites  - f/u further heme/onc recs 2 wks of episodic fevers, with active cholangiocarcinoma on chemo following Wozey, with simultaneous new moderate, episodic abd pain that migrates around abd, no leukocytosis but on chemo. initial ED w/u on 11/1 negative for bacteremia, UTI, progression of malignancy per imaging. given fever + abd pain, ddx liver abscess, sbp, cholangitis, possibly central line infection of chemo port. possibly tumor/chemo fevers. fevers refractory to 5d of augmentin.     Plan  - monitor off ABX  -f/u ID recs  - f/u RUQ US to evaluate for liver abscess  - f/u abd US to evaluate for ascites  - f/u further heme/onc recs

## 2024-11-12 NOTE — H&P ADULT - NSICDXPASTMEDICALHX_GEN_ALL_CORE_FT
PAST MEDICAL HISTORY:  Cholangiocarcinoma     Hyperlipidemia     Hypertension     Ovarian cancer     Vestibular neuritis

## 2024-11-13 LAB
ADD ON TEST-SPECIMEN IN LAB: SIGNIFICANT CHANGE UP
ANION GAP SERPL CALC-SCNC: 9 MMOL/L — SIGNIFICANT CHANGE UP (ref 5–17)
BUN SERPL-MCNC: 17 MG/DL — SIGNIFICANT CHANGE UP (ref 7–23)
CALCIUM SERPL-MCNC: 9.2 MG/DL — SIGNIFICANT CHANGE UP (ref 8.4–10.5)
CHLORIDE SERPL-SCNC: 101 MMOL/L — SIGNIFICANT CHANGE UP (ref 96–108)
CO2 SERPL-SCNC: 25 MMOL/L — SIGNIFICANT CHANGE UP (ref 22–31)
CREAT SERPL-MCNC: 0.72 MG/DL — SIGNIFICANT CHANGE UP (ref 0.5–1.3)
CULTURE RESULTS: SIGNIFICANT CHANGE UP
EGFR: 86 ML/MIN/1.73M2 — SIGNIFICANT CHANGE UP
GLUCOSE SERPL-MCNC: 116 MG/DL — HIGH (ref 70–99)
HCT VFR BLD CALC: 25.6 % — LOW (ref 34.5–45)
HGB BLD-MCNC: 8 G/DL — LOW (ref 11.5–15.5)
MAGNESIUM SERPL-MCNC: 2.1 MG/DL — SIGNIFICANT CHANGE UP (ref 1.6–2.6)
MCHC RBC-ENTMCNC: 31.3 G/DL — LOW (ref 32–36)
MCHC RBC-ENTMCNC: 32.1 PG — SIGNIFICANT CHANGE UP (ref 27–34)
MCV RBC AUTO: 102.8 FL — HIGH (ref 80–100)
NRBC # BLD: 0 /100 WBCS — SIGNIFICANT CHANGE UP (ref 0–0)
PHOSPHATE SERPL-MCNC: 3.8 MG/DL — SIGNIFICANT CHANGE UP (ref 2.5–4.5)
PLATELET # BLD AUTO: 384 K/UL — SIGNIFICANT CHANGE UP (ref 150–400)
POTASSIUM SERPL-MCNC: 4.6 MMOL/L — SIGNIFICANT CHANGE UP (ref 3.5–5.3)
POTASSIUM SERPL-SCNC: 4.6 MMOL/L — SIGNIFICANT CHANGE UP (ref 3.5–5.3)
RAPID RVP RESULT: SIGNIFICANT CHANGE UP
RBC # BLD: 2.49 M/UL — LOW (ref 3.8–5.2)
RBC # FLD: 15.6 % — HIGH (ref 10.3–14.5)
S PNEUM AG UR QL: NEGATIVE — SIGNIFICANT CHANGE UP
SARS-COV-2 RNA SPEC QL NAA+PROBE: SIGNIFICANT CHANGE UP
SODIUM SERPL-SCNC: 135 MMOL/L — SIGNIFICANT CHANGE UP (ref 135–145)
SPECIMEN SOURCE: SIGNIFICANT CHANGE UP
WBC # BLD: 9.08 K/UL — SIGNIFICANT CHANGE UP (ref 3.8–10.5)
WBC # FLD AUTO: 9.08 K/UL — SIGNIFICANT CHANGE UP (ref 3.8–10.5)

## 2024-11-13 PROCEDURE — 99222 1ST HOSP IP/OBS MODERATE 55: CPT

## 2024-11-13 RX ORDER — IBUPROFEN 200 MG
200 TABLET ORAL ONCE
Refills: 0 | Status: COMPLETED | OUTPATIENT
Start: 2024-11-13 | End: 2024-11-13

## 2024-11-13 RX ORDER — SENNOSIDES 8.6 MG
2 TABLET ORAL AT BEDTIME
Refills: 0 | Status: DISCONTINUED | OUTPATIENT
Start: 2024-11-13 | End: 2024-11-19

## 2024-11-13 RX ORDER — POLYETHYLENE GLYCOL 3350 17 G/17G
17 POWDER, FOR SOLUTION ORAL
Refills: 0 | Status: DISCONTINUED | OUTPATIENT
Start: 2024-11-13 | End: 2024-11-19

## 2024-11-13 RX ORDER — LORAZEPAM 2 MG/1
0.5 TABLET ORAL ONCE
Refills: 0 | Status: DISCONTINUED | OUTPATIENT
Start: 2024-11-13 | End: 2024-11-14

## 2024-11-13 RX ADMIN — LISINOPRIL 10 MILLIGRAM(S): 20 TABLET ORAL at 22:17

## 2024-11-13 RX ADMIN — PANTOPRAZOLE SODIUM 40 MILLIGRAM(S): 40 TABLET, DELAYED RELEASE ORAL at 06:55

## 2024-11-13 RX ADMIN — MONTELUKAST SODIUM 10 MILLIGRAM(S): 10 TABLET ORAL at 22:18

## 2024-11-13 RX ADMIN — Medication 200 MILLIGRAM(S): at 11:41

## 2024-11-13 RX ADMIN — AMLODIPINE BESYLATE 2.5 MILLIGRAM(S): 10 TABLET ORAL at 22:18

## 2024-11-13 RX ADMIN — Medication 200 MILLIGRAM(S): at 12:00

## 2024-11-13 RX ADMIN — ATENOLOL 25 MILLIGRAM(S): 100 TABLET ORAL at 22:18

## 2024-11-13 RX ADMIN — Medication 10 MILLIGRAM(S): at 22:18

## 2024-11-13 RX ADMIN — Medication 200 MILLIGRAM(S): at 00:00

## 2024-11-13 RX ADMIN — Medication 2 TABLET(S): at 22:18

## 2024-11-13 NOTE — CONSULT NOTE ADULT - SUBJECTIVE AND OBJECTIVE BOX
INFECTIOUS DISEASES INITIAL CONSULT NOTE    HPI:  79F PMHx unresectable cholangiocarcinoma on chemo s/p embolization (follows Nolberto), vestibular neuritis, remote ovarian cancer, HTN, HLD presenting with several weeks of fever of unknown origin and abdominal pain. On 10/28, several days after a chemo session, she began to have episodic fevers and chills. She presented to the ED on 11/1 for these fever and was discharged w/o admission with augmentin 7d, of which she completed 5d, stopping d/t GI side effects. Workup in ED included: BCx NGTD, CTAP showing stable cholangiocarcinoma. Labs significant for hyponatremia, high Alk phos, negative UA. Tried treating fever with tylenol. Alongside these fevers, she is having moderate, episodic abd pain that moves around her abd, worsened noticeably when she sits up and down. The pain has a component of "tension."    ROS + for chronic headaches, weight gain  ROS - for chest pain, dyspnea, SOB, changes in urination/bowel habits, leg swelling, recent travel, sick contacts, nausea vomiting diarrhea constipation      ED Course     123/81, 97HR, 18RR, 98.3F, 100% O2 on RA     CBC Hb 9.2, .4, Plt 427     BMP Na 133, Alk phos 226, AST 47     UA LE small, 98 WBC, Hep B/C negative, IM screen negative, RVP negative, HIV negative, TSH normal     CXR Lungs clear. Right port catheter tip at cavoatrial junction.     Interventions: 1L NS x1  (12 Nov 2024 13:12)      PAST MEDICAL & SURGICAL HISTORY:  Cholangiocarcinoma      Vestibular neuritis      Ovarian cancer      Hypertension      Hyperlipidemia            Review of Systems:   Constitutional, eyes, ENT, cardiovascular, respiratory, gastrointestinal, genitourinary, integumentary, neurological, psychiatric and heme/lymph are otherwise negative other than noted above       ANTIBIOTICS:  MEDICATIONS  (STANDING):  amLODIPine   Tablet 2.5 milliGRAM(s) Oral daily  atenolol  Tablet 25 milliGRAM(s) Oral daily  atorvastatin 10 milliGRAM(s) Oral at bedtime  enoxaparin Injectable 40 milliGRAM(s) SubCutaneous every 24 hours  lisinopril 10 milliGRAM(s) Oral daily  montelukast 10 milliGRAM(s) Oral daily  pantoprazole    Tablet 40 milliGRAM(s) Oral before breakfast    MEDICATIONS  (PRN):      Allergies    narcotic analgesics (Other)    Intolerances        SOCIAL HISTORY:    FAMILY HISTORY:   no FH leading to current infection    Vital Signs Last 24 Hrs  T(C): 36.7 (13 Nov 2024 13:00), Max: 36.8 (12 Nov 2024 18:12)  T(F): 98 (13 Nov 2024 13:00), Max: 98.2 (12 Nov 2024 18:12)  HR: 80 (13 Nov 2024 13:00) (80 - 119)  BP: 137/80 (13 Nov 2024 13:00) (124/74 - 147/77)  BP(mean): 91 (13 Nov 2024 05:54) (91 - 91)  RR: 19 (13 Nov 2024 13:00) (17 - 19)  SpO2: 99% (13 Nov 2024 13:00) (98% - 100%)    Parameters below as of 13 Nov 2024 13:00  Patient On (Oxygen Delivery Method): room air          PHYSICAL EXAM:  Constitutional: alert, NAD  Eyes: the sclera and conjunctiva were normal.   ENT: the ears and nose were normal in appearance.   Neck: the appearance of the neck was normal and the neck was supple.   Pulmonary: no respiratory distress and lungs were clear to auscultation bilaterally.   Heart: heart rate was normal and rhythm regular, normal S1 and S2  Vascular:. there was no peripheral edema  Abdomen: normal bowel sounds, soft, non-tender  Neurological: no focal deficits.   Psychiatric: the affect was normal      LABS:                        8.0    9.08  )-----------( 384      ( 13 Nov 2024 05:30 )             25.6     11-13    135  |  101  |  17  ----------------------------<  116[H]  4.6   |  25  |  0.72    Ca    9.2      13 Nov 2024 05:30  Phos  3.8     11-13  Mg     2.1     11-13    TPro  6.5  /  Alb  3.0[L]  /  TBili  0.2  /  DBili  <0.2  /  AST  46[H]  /  ALT  39  /  AlkPhos  232[H]  11-13      Urinalysis Basic - ( 13 Nov 2024 05:30 )    Color: x / Appearance: x / SG: x / pH: x  Gluc: 116 mg/dL / Ketone: x  / Bili: x / Urobili: x   Blood: x / Protein: x / Nitrite: x   Leuk Esterase: x / RBC: x / WBC x   Sq Epi: x / Non Sq Epi: x / Bacteria: x        MICROBIOLOGY:    RADIOLOGY & ADDITIONAL STUDIES:   INFECTIOUS DISEASES INITIAL CONSULT NOTE    HPI:  79F PMHx unresectable cholangiocarcinoma on chemo s/p embolization (follows Nolberto), vestibular neuritis, remote ovarian cancer, HTN, HLD presenting with several weeks of fever of unknown origin and abdominal pain. Patient was diagnosed with chlangiocarcinoma in 3/2024.  She had radioembolization on 5/1/24 and 6/10/24. She was started on chemo with immunotherapy.  She had cycle 5 on 10/24/24 (gemcitabine, cisplatin, durvalumab).  On 10/29, she started to have fever of 101 a/w intermittent vague abdominal pain. She didn't have other symptoms - denied sore throat, chills, n/v/d, CP, SOB, dysuria, rhinorrhea.  Denied any travel or outdoor activity due to cancer, and she always wears mask when she goes outside.  Denied sick contacts. She presented to the ED on 11/1 for these fever and was discharged w/o admission with augmentin 7d, of which she completed 5d, stopping d/t GI side effects. Workup in ED included: BCx NGTD, CTAP showing stable cholangiocarcinoma. Labs significant for hyponatremia, high Alk phos, negative UA. Tried treating fever with tylenol. Alongside these fevers, she is having moderate, episodic abd pain that moves around her abd, worsened noticeably when she sits up and down. The pain has a component of "tension."    ROS + for chronic headaches, weight gain  ROS - for chest pain, dyspnea, SOB, changes in urination/bowel habits, leg swelling, recent travel, sick contacts, nausea vomiting diarrhea constipation      ED Course     123/81, 97HR, 18RR, 98.3F, 100% O2 on RA     CBC Hb 9.2, .4, Plt 427     BMP Na 133, Alk phos 226, AST 47     UA LE small, 98 WBC, Hep B/C negative, IM screen negative, RVP negative, HIV negative, TSH normal     CXR Lungs clear. Right port catheter tip at cavoatrial junction.     Interventions: 1L NS x1  (12 Nov 2024 13:12)      PAST MEDICAL & SURGICAL HISTORY:  Cholangiocarcinoma      Vestibular neuritis      Ovarian cancer      Hypertension      Hyperlipidemia            Review of Systems:   Constitutional, eyes, ENT, cardiovascular, respiratory, gastrointestinal, genitourinary, integumentary, neurological, psychiatric and heme/lymph are otherwise negative other than noted above       ANTIBIOTICS:  MEDICATIONS  (STANDING):  amLODIPine   Tablet 2.5 milliGRAM(s) Oral daily  atenolol  Tablet 25 milliGRAM(s) Oral daily  atorvastatin 10 milliGRAM(s) Oral at bedtime  enoxaparin Injectable 40 milliGRAM(s) SubCutaneous every 24 hours  lisinopril 10 milliGRAM(s) Oral daily  montelukast 10 milliGRAM(s) Oral daily  pantoprazole    Tablet 40 milliGRAM(s) Oral before breakfast    MEDICATIONS  (PRN):      Allergies    narcotic analgesics (Other)    Intolerances        SOCIAL HISTORY:    FAMILY HISTORY:   no FH leading to current infection    Vital Signs Last 24 Hrs  T(C): 36.7 (13 Nov 2024 13:00), Max: 36.8 (12 Nov 2024 18:12)  T(F): 98 (13 Nov 2024 13:00), Max: 98.2 (12 Nov 2024 18:12)  HR: 80 (13 Nov 2024 13:00) (80 - 119)  BP: 137/80 (13 Nov 2024 13:00) (124/74 - 147/77)  BP(mean): 91 (13 Nov 2024 05:54) (91 - 91)  RR: 19 (13 Nov 2024 13:00) (17 - 19)  SpO2: 99% (13 Nov 2024 13:00) (98% - 100%)    Parameters below as of 13 Nov 2024 13:00  Patient On (Oxygen Delivery Method): room air          PHYSICAL EXAM:  Constitutional: alert, NAD  Eyes: the sclera and conjunctiva were normal.   ENT: the ears and nose were normal in appearance.   Neck: the appearance of the neck was normal and the neck was supple.   Pulmonary: no respiratory distress and lungs were clear to auscultation bilaterally.   Heart: heart rate was normal and rhythm regular, normal S1 and S2  Vascular:. there was no peripheral edema  Abdomen: normal bowel sounds, soft, non-tender  Neurological: no focal deficits.   Psychiatric: the affect was normal      LABS:                        8.0    9.08  )-----------( 384      ( 13 Nov 2024 05:30 )             25.6     11-13    135  |  101  |  17  ----------------------------<  116[H]  4.6   |  25  |  0.72    Ca    9.2      13 Nov 2024 05:30  Phos  3.8     11-13  Mg     2.1     11-13    TPro  6.5  /  Alb  3.0[L]  /  TBili  0.2  /  DBili  <0.2  /  AST  46[H]  /  ALT  39  /  AlkPhos  232[H]  11-13      Urinalysis Basic - ( 13 Nov 2024 05:30 )    Color: x / Appearance: x / SG: x / pH: x  Gluc: 116 mg/dL / Ketone: x  / Bili: x / Urobili: x   Blood: x / Protein: x / Nitrite: x   Leuk Esterase: x / RBC: x / WBC x   Sq Epi: x / Non Sq Epi: x / Bacteria: x        MICROBIOLOGY:  Blood Cx sent     RADIOLOGY & ADDITIONAL STUDIES:  11/12 RUQ US Unchanged intrahepatic ductal dilatation and hepatic masses consistent with known cholangiocarcinoma.  11/12 CXR no significant changes

## 2024-11-13 NOTE — PROGRESS NOTE ADULT - PROBLEM SELECTOR PLAN 7
D:   E: replete prn  DVT: lovenox 40mg qD  F: tolerating PO D: regular  E: replete prn  DVT: lovenox 40mg qD  F: tolerating PO  Dispo: F

## 2024-11-13 NOTE — PROGRESS NOTE ADULT - PROBLEM SELECTOR PLAN 4
chronic since starting chemo, on home prilosec. continue with home meds chronic since starting chemo, on home prilosec.     Plan  - continue with home meds

## 2024-11-13 NOTE — PROGRESS NOTE ADULT - PROBLEM SELECTOR PLAN 5
on home amlodipine benzapril 2.5-10mg qD, atenolol 25mg qD. we don't have benzapril, so switch to lisinopril 10mg and amlodipine 2.5mg qD on home amlodipine benzapril 2.5-10mg qD, atenolol 25mg qD.     Plan  - c/w we don't have benzapril, so switch to lisinopril 10mg and amlodipine 2.5mg qD on home amlodipine benzapril 2.5-10mg qD, atenolol 25mg qD.     Plan  - c/w atenolol 25mg qd  - c/w benzapril, so switch to lisinopril 10mg and amlodipine 2.5mg qD on home amlodipine benzapril 2.5-10mg qD, atenolol 25mg qD.     Plan  - c/w atenolol 25mg qd  - c/w lisinopril 10mg for benzapril TI  - c/w amlodipine 2.5mg qD

## 2024-11-13 NOTE — PROGRESS NOTE ADULT - PROBLEM SELECTOR PLAN 6
c/w home atorvastatin 10mg qD Home meds: atorvastatin 10mg qd    Plan  - c/w home atorvastatin 10mg qd

## 2024-11-13 NOTE — PROGRESS NOTE ADULT - SUBJECTIVE AND OBJECTIVE BOX
OVERNIGHT EVENTS:    SUBJECTIVE / INTERVAL HPI: Patient seen and examined at bedside.       PHYSICAL EXAM:    General: NAD  HEENT: NC/AT; PERRL, anicteric sclera; MMM  Neck: supple  Cardiovascular: +S1/S2, RRR  Respiratory: CTA B/L; no W/R/R  Gastrointestinal: soft, NT/ND; +BSx4  Extremities: WWP; no edema, clubbing or cyanosis  Vascular: 2+ radial, DP/PT pulses B/L  Neurological: AAOx3; no focal deficits  Psychiatric: pleasant mood and affect  Dermatologic: no appreciable wounds or damage to the skin    VITAL SIGNS:  Vital Signs Last 24 Hrs  T(C): 36.6 (13 Nov 2024 05:54), Max: 36.8 (12 Nov 2024 09:54)  T(F): 97.8 (13 Nov 2024 05:54), Max: 98.3 (12 Nov 2024 09:54)  HR: 83 (13 Nov 2024 05:54) (83 - 119)  BP: 124/74 (13 Nov 2024 05:54) (123/81 - 147/77)  BP(mean): 91 (13 Nov 2024 05:54) (91 - 91)  RR: 18 (13 Nov 2024 05:54) (16 - 18)  SpO2: 99% (13 Nov 2024 05:54) (98% - 100%)    Parameters below as of 13 Nov 2024 05:54  Patient On (Oxygen Delivery Method): room air          MEDICATIONS:  MEDICATIONS  (STANDING):  amLODIPine   Tablet 2.5 milliGRAM(s) Oral daily  atenolol  Tablet 25 milliGRAM(s) Oral daily  atorvastatin 10 milliGRAM(s) Oral at bedtime  enoxaparin Injectable 40 milliGRAM(s) SubCutaneous every 24 hours  lisinopril 10 milliGRAM(s) Oral daily  montelukast 10 milliGRAM(s) Oral daily  pantoprazole    Tablet 40 milliGRAM(s) Oral before breakfast    MEDICATIONS  (PRN):      ALLERGIES:  Allergies    narcotic analgesics (Other)    Intolerances        LABS:                        9.2    9.60  )-----------( 427      ( 12 Nov 2024 10:52 )             29.0     11-12    133[L]  |  99  |  17  ----------------------------<  118[H]  4.9   |  27  |  0.75    Ca    9.4      12 Nov 2024 10:52    TPro  7.4  /  Alb  3.4  /  TBili  0.2  /  DBili  x   /  AST  47[H]  /  ALT  38  /  AlkPhos  226[H]  11-12      Urinalysis Basic - ( 12 Nov 2024 10:52 )    Color: x / Appearance: x / SG: x / pH: x  Gluc: 118 mg/dL / Ketone: x  / Bili: x / Urobili: x   Blood: x / Protein: x / Nitrite: x   Leuk Esterase: x / RBC: x / WBC x   Sq Epi: x / Non Sq Epi: x / Bacteria: x      CAPILLARY BLOOD GLUCOSE          RADIOLOGY & ADDITIONAL TESTS: Reviewed. OVERNIGHT EVENTS: Hem/onc consulted, need to follow up on recommendations. Ordered RUQ and abdominal ultrasound    SUBJECTIVE / INTERVAL HPI: Patient seen and examined at bedside.       PHYSICAL EXAM:    General: NAD  HEENT: NC/AT; PERRL, anicteric sclera; MMM  Neck: supple  Cardiovascular: +S1/S2, RRR  Respiratory: CTA B/L; no W/R/R  Gastrointestinal: soft, NT/ND; +BSx4  Extremities: WWP; no edema, clubbing or cyanosis  Vascular: 2+ radial, DP/PT pulses B/L  Neurological: AAOx3; no focal deficits  Psychiatric: pleasant mood and affect  Dermatologic: no appreciable wounds or damage to the skin    VITAL SIGNS:  Vital Signs Last 24 Hrs  T(C): 36.6 (13 Nov 2024 05:54), Max: 36.8 (12 Nov 2024 09:54)  T(F): 97.8 (13 Nov 2024 05:54), Max: 98.3 (12 Nov 2024 09:54)  HR: 83 (13 Nov 2024 05:54) (83 - 119)  BP: 124/74 (13 Nov 2024 05:54) (123/81 - 147/77)  BP(mean): 91 (13 Nov 2024 05:54) (91 - 91)  RR: 18 (13 Nov 2024 05:54) (16 - 18)  SpO2: 99% (13 Nov 2024 05:54) (98% - 100%)    Parameters below as of 13 Nov 2024 05:54  Patient On (Oxygen Delivery Method): room air          MEDICATIONS:  MEDICATIONS  (STANDING):  amLODIPine   Tablet 2.5 milliGRAM(s) Oral daily  atenolol  Tablet 25 milliGRAM(s) Oral daily  atorvastatin 10 milliGRAM(s) Oral at bedtime  enoxaparin Injectable 40 milliGRAM(s) SubCutaneous every 24 hours  lisinopril 10 milliGRAM(s) Oral daily  montelukast 10 milliGRAM(s) Oral daily  pantoprazole    Tablet 40 milliGRAM(s) Oral before breakfast    MEDICATIONS  (PRN):      ALLERGIES:  Allergies    narcotic analgesics (Other)    Intolerances        LABS:                        9.2    9.60  )-----------( 427      ( 12 Nov 2024 10:52 )             29.0     11-12    133[L]  |  99  |  17  ----------------------------<  118[H]  4.9   |  27  |  0.75    Ca    9.4      12 Nov 2024 10:52    TPro  7.4  /  Alb  3.4  /  TBili  0.2  /  DBili  x   /  AST  47[H]  /  ALT  38  /  AlkPhos  226[H]  11-12      Urinalysis Basic - ( 12 Nov 2024 10:52 )    Color: x / Appearance: x / SG: x / pH: x  Gluc: 118 mg/dL / Ketone: x  / Bili: x / Urobili: x   Blood: x / Protein: x / Nitrite: x   Leuk Esterase: x / RBC: x / WBC x   Sq Epi: x / Non Sq Epi: x / Bacteria: x      CAPILLARY BLOOD GLUCOSE          RADIOLOGY & ADDITIONAL TESTS: Reviewed. OVERNIGHT EVENTS: No acute events.    SUBJECTIVE / INTERVAL HPI: 80 y/o female seen and examined at bedside. Pt reports that she did not feel feverish yesterday. She states that her maximum temperature reading at home was 102F. She does mention that she had night sweats last night. She also reports some loss of appetite, mild nausea, and vague abdominal pain. Pt describes the abdominal pain as being "in the muscles of the wall" and states that it presents at different locations around the abdomen intermittently. She states that the pain is aggravated when standing from sitting and when sitting from standing. Patient also reports chronic headaches and reports a current mild headache, she requests Advil for the headache.     Denies chest pain, shortness of breath, cough, congestion, vomiting, diarrhea, sore throat, odynophagia, dysuria, change in urination pattern, rash.     PHYSICAL EXAM:    General: Well-appearing, no apparent distress  HEENT: Anicteric sclera  Cardiovascular: +S1/S2, RRR  Respiratory: CTA B/L; no W/R/R; pt breathing without difficulty and speaking in full sentences  Gastrointestinal: some firmness at the upper epigastric region and right upper quadrant, otherwise soft; mild tenderness to palpation at the left lower quadrant; liver palpable; +BSx4  Extremities: WWP; no edema, clubbing or cyanosis  Vascular: 2+ radial, DP/PT pulses B/L  Neurological: AAOx3; no focal deficits  Psychiatric: pleasant mood and affect  Dermatologic: no appreciable wounds or damage to the skin    VITAL SIGNS:  Vital Signs Last 24 Hrs  T(C): 36.6 (13 Nov 2024 05:54), Max: 36.8 (12 Nov 2024 09:54)  T(F): 97.8 (13 Nov 2024 05:54), Max: 98.3 (12 Nov 2024 09:54)  HR: 83 (13 Nov 2024 05:54) (83 - 119)  BP: 124/74 (13 Nov 2024 05:54) (123/81 - 147/77)  BP(mean): 91 (13 Nov 2024 05:54) (91 - 91)  RR: 18 (13 Nov 2024 05:54) (16 - 18)  SpO2: 99% (13 Nov 2024 05:54) (98% - 100%)    Parameters below as of 13 Nov 2024 05:54  Patient On (Oxygen Delivery Method): room air    MEDICATIONS:  MEDICATIONS (STANDING):  amLODIPine Tablet 2.5 milliGRAM(s) Oral daily  atenolol  Tablet 25 milliGRAM(s) Oral daily  atorvastatin 10 milliGRAM(s) Oral at bedtime  enoxaparin Injectable 40 milliGRAM(s) SubCutaneous every 24 hours  lisinopril 10 milliGRAM(s) Oral daily  montelukast 10 milliGRAM(s) Oral daily  pantoprazole Tablet 40 milliGRAM(s) Oral before breakfast    MEDICATIONS (PRN):      ALLERGIES:    narcotic analgesics (Other)    Intolerances      LABS:                        9.2    9.60  )-----------( 427      ( 12 Nov 2024 10:52 )             29.0     11-12    133[L]  |  99  |  17  ----------------------------<  118[H]  4.9   |  27  |  0.75    Ca    9.4      12 Nov 2024 10:52    TPro  7.4  /  Alb  3.4  /  TBili  0.2  /  DBili  x   /  AST  47[H]  /  ALT  38  /  AlkPhos  226[H]  11-12      Urinalysis Basic - ( 12 Nov 2024 10:52 )    Color: x / Appearance: x / SG: x / pH: x  Gluc: 118 mg/dL / Ketone: x  / Bili: x / Urobili: x   Blood: x / Protein: x / Nitrite: x   Leuk Esterase: x / RBC: x / WBC x   Sq Epi: x / Non Sq Epi: x / Bacteria: x      CAPILLARY BLOOD GLUCOSE          RADIOLOGY & ADDITIONAL TESTS: Reviewed. OVERNIGHT EVENTS: No acute events.    SUBJECTIVE / INTERVAL HPI: 80 y/o female seen and examined at bedside. Pt reports that she did not feel feverish yesterday. She states that she has completed 4 out of 6 of her scheduled chemotherapy cycles. She was unable to attend an appointment on 10/31 for her 5th cycle due to her recent symptoms. She states that she will be cancelling her upcoming appointment for chemotherapy tomorrow as well. She states that since the intermittent fevers began, her maximum temperature reading at home was 102F. She does mention that she had night sweats last night. She also reports some loss of appetite, mild nausea, and vague abdominal pain. Pt describes the abdominal pain as being "in the muscles of the wall" and states that it presents at different locations around the abdomen intermittently. She states that the pain is aggravated when standing from sitting as well as when sitting from standing. Patient also reports chronic headaches and reports a current mild headache, she requests Advil for the headache.       Denies chest pain, shortness of breath, cough, congestion, vomiting, diarrhea, sore throat, odynophagia, dysuria, change in urination pattern, rash.    PHYSICAL EXAM:    General: Well-appearing, no apparent distress  HEENT: Anicteric sclera  Cardiovascular: Regular rate and rhythm, +S1/S2  Respiratory: Clear to auscultation B/L; no wheezes, rales, or rhonchi; pt breathing without difficulty and speaking in full sentences  Gastrointestinal: Some firmness at the upper epigastric region and right upper quadrant, otherwise soft; mild tenderness to palpation at the left lower quadrant; liver palpable; +BSx4  Extremities: warm and well perfused; no edema, clubbing, or cyanosis  Vascular: Distal pulses palpable B/L  Neurological: A&Ox3; no focal deficits  Psychiatric: Pleasant mood and affect  Dermatologic: No appreciable wounds, rash, damage to the visible skin; chemo port located at right upper chest without erythema, ecchymosis, rash    VITAL SIGNS:  Vital Signs Last 24 Hrs  T(C): 36.6 (13 Nov 2024 05:54), Max: 36.8 (12 Nov 2024 09:54)  T(F): 97.8 (13 Nov 2024 05:54), Max: 98.3 (12 Nov 2024 09:54)  HR: 83 (13 Nov 2024 05:54) (83 - 119)  BP: 124/74 (13 Nov 2024 05:54) (123/81 - 147/77)  BP(mean): 91 (13 Nov 2024 05:54) (91 - 91)  RR: 18 (13 Nov 2024 05:54) (16 - 18)  SpO2: 99% (13 Nov 2024 05:54) (98% - 100%)    Parameters below as of 13 Nov 2024 05:54  Patient On (Oxygen Delivery Method): room air    MEDICATIONS:  MEDICATIONS (STANDING):  amLODIPine Tablet 2.5 milliGRAM(s) Oral daily  atenolol  Tablet 25 milliGRAM(s) Oral daily  atorvastatin 10 milliGRAM(s) Oral at bedtime  enoxaparin Injectable 40 milliGRAM(s) SubCutaneous every 24 hours  lisinopril 10 milliGRAM(s) Oral daily  montelukast 10 milliGRAM(s) Oral daily  pantoprazole Tablet 40 milliGRAM(s) Oral before breakfast    MEDICATIONS (PRN):  albuterol for asthma  loratadine for seasonal allergies  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   magnesium supplement for foot cramps    ALLERGIES:    narcotic analgesics (Other)    LABS:                        9.2    9.60  )-----------( 427      ( 12 Nov 2024 10:52 )             29.0     11-12    133[L]  |  99  |  17  ----------------------------<  118[H]  4.9   |  27  |  0.75    Ca    9.4      12 Nov 2024 10:52    TPro  7.4  /  Alb  3.4  /  TBili  0.2  /  DBili  x   /  AST  47[H]  /  ALT  38  /  AlkPhos  226[H]  11-12      Urinalysis Basic - ( 12 Nov 2024 10:52 )    Color: x / Appearance: x / SG: x / pH: x  Gluc: 118 mg/dL / Ketone: x  / Bili: x / Urobili: x   Blood: x / Protein: x / Nitrite: x   Leuk Esterase: x / RBC: x / WBC x   Sq Epi: x / Non Sq Epi: x / Bacteria: x      CAPILLARY BLOOD GLUCOSE          RADIOLOGY & ADDITIONAL TESTS: Reviewed. OVERNIGHT EVENTS: No acute events.    SUBJECTIVE / INTERVAL HPI: 78 y/o female presenting with fevers seen and examined at bedside. Pt reports that she did not feel feverish yesterday. She states that she has completed 4 out of 6 of her scheduled chemotherapy cycles. She was unable to attend an appointment on 10/31 for her 5th cycle due to her recent symptoms. She states that she will be cancelling her upcoming appointment for chemotherapy tomorrow as well. She states that since the intermittent fevers began, her maximum temperature reading at home was 102F. She does mention that she had night sweats last night. She also reports some loss of appetite, mild nausea, and vague abdominal pain. Pt describes the abdominal pain as being "in the muscles of the wall" and states that it presents at different locations around the abdomen intermittently. She states that the pain is aggravated when standing from sitting as well as when sitting from standing. Patient also reports chronic headaches and reports a current mild headache, she requests Advil for the headache.       Denies chest pain, shortness of breath, cough, congestion, vomiting, diarrhea, sore throat, odynophagia, dysuria, change in urination pattern, rash.    PHYSICAL EXAM:    General: Well-appearing, no apparent distress  HEENT: Anicteric sclera  Cardiovascular: Regular rate and rhythm, +S1/S2  Respiratory: Clear to auscultation B/L; no wheezes, rales, or rhonchi; pt breathing without difficulty and speaking in full sentences  Gastrointestinal: Some firmness at the upper epigastric region and right upper quadrant, otherwise soft; mild tenderness to palpation at the left lower quadrant; liver palpable; +BSx4  Extremities: warm and well perfused; no edema, clubbing, or cyanosis  Vascular: Distal pulses palpable B/L  Neurological: A&Ox3; no focal deficits  Psychiatric: Pleasant mood and affect  Dermatologic: No appreciable wounds, rash, damage to the visible skin; chemo port located at right upper chest without erythema, ecchymosis, rash    VITAL SIGNS:  Vital Signs Last 24 Hrs  T(C): 36.6 (13 Nov 2024 05:54), Max: 36.8 (12 Nov 2024 09:54)  T(F): 97.8 (13 Nov 2024 05:54), Max: 98.3 (12 Nov 2024 09:54)  HR: 83 (13 Nov 2024 05:54) (83 - 119)  BP: 124/74 (13 Nov 2024 05:54) (123/81 - 147/77)  BP(mean): 91 (13 Nov 2024 05:54) (91 - 91)  RR: 18 (13 Nov 2024 05:54) (16 - 18)  SpO2: 99% (13 Nov 2024 05:54) (98% - 100%)    Parameters below as of 13 Nov 2024 05:54  Patient On (Oxygen Delivery Method): room air    MEDICATIONS:  MEDICATIONS (STANDING):  amLODIPine Tablet 2.5 milliGRAM(s) Oral daily  atenolol  Tablet 25 milliGRAM(s) Oral daily  atorvastatin 10 milliGRAM(s) Oral at bedtime  enoxaparin Injectable 40 milliGRAM(s) SubCutaneous every 24 hours  lisinopril 10 milliGRAM(s) Oral daily  montelukast 10 milliGRAM(s) Oral daily  pantoprazole Tablet 40 milliGRAM(s) Oral before breakfast    MEDICATIONS (PRN):  albuterol for asthma  loratadine for seasonal allergies  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   magnesium supplement for foot cramps    ALLERGIES:    narcotic analgesics (Other)    LABS:                        9.2    9.60  )-----------( 427      ( 12 Nov 2024 10:52 )             29.0     11-12    133[L]  |  99  |  17  ----------------------------<  118[H]  4.9   |  27  |  0.75    Ca    9.4      12 Nov 2024 10:52    TPro  7.4  /  Alb  3.4  /  TBili  0.2  /  DBili  x   /  AST  47[H]  /  ALT  38  /  AlkPhos  226[H]  11-12      Urinalysis Basic - ( 12 Nov 2024 10:52 )    Color: x / Appearance: x / SG: x / pH: x  Gluc: 118 mg/dL / Ketone: x  / Bili: x / Urobili: x   Blood: x / Protein: x / Nitrite: x   Leuk Esterase: x / RBC: x / WBC x   Sq Epi: x / Non Sq Epi: x / Bacteria: x      CAPILLARY BLOOD GLUCOSE          RADIOLOGY & ADDITIONAL TESTS: Reviewed. OVERNIGHT EVENTS: No acute events.    SUBJECTIVE / INTERVAL HPI: 80 y/o female, PMH HTN, HLD, remote ovarian cancer, and unresectable cholangiocarcinoma on chemo s/p embolization now presenting with fevers, seen and examined at bedside. Pt reports that the fevers had recently been occurring twice per day, once in the afternoon/evening and once at night. She reports that since the onset of these intermittent fevers, her maximum temperature reading at home was 102F. She states that she did not feel feverish yesterday. She does mention that she had night sweats last night. She also reports some loss of appetite, mild nausea, and vague abdominal pain. Pt describes the abdominal pain as being "in the muscles of the wall" and states that it presents at different locations around the abdomen intermittently. She states that the pain is aggravated when standing from sitting as well as when sitting from standing. She reports that she has completed 4 out of 6 of her scheduled chemotherapy cycles. She was unable to attend an appointment on 10/31 for her 5th cycle due to her recent symptoms. She states that she will be cancelling her upcoming appointment for chemotherapy tomorrow as well. 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     Denies chest pain, shortness of breath, cough, congestion, vomiting, diarrhea, sore throat, odynophagia, dysuria, change in urination pattern, rash.    PHYSICAL EXAM:    General: Well-appearing, no apparent distress  HEENT: Anicteric sclera  Cardiovascular: Regular rate and rhythm, +S1/S2  Respiratory: Clear to auscultation B/L; no wheezes, rales, or rhonchi; pt breathing without difficulty and speaking in full sentences  Gastrointestinal: Some firmness at the upper epigastric region and right upper quadrant, otherwise soft; mild tenderness to palpation at the left lower quadrant; liver palpable; +BSx4  Extremities: warm and well perfused; no edema, clubbing, or cyanosis  Vascular: Distal pulses palpable B/L  Neurological: A&Ox3; no focal deficits  Psychiatric: Pleasant mood and affect  Dermatologic: No appreciable wounds, rash, damage to the visible skin; chemo port located at right upper chest without erythema, ecchymosis, rash    VITAL SIGNS:  Vital Signs Last 24 Hrs  T(C): 36.6 (13 Nov 2024 05:54), Max: 36.8 (12 Nov 2024 09:54)  T(F): 97.8 (13 Nov 2024 05:54), Max: 98.3 (12 Nov 2024 09:54)  HR: 83 (13 Nov 2024 05:54) (83 - 119)  BP: 124/74 (13 Nov 2024 05:54) (123/81 - 147/77)  BP(mean): 91 (13 Nov 2024 05:54) (91 - 91)  RR: 18 (13 Nov 2024 05:54) (16 - 18)  SpO2: 99% (13 Nov 2024 05:54) (98% - 100%)    Parameters below as of 13 Nov 2024 05:54  Patient On (Oxygen Delivery Method): room air    MEDICATIONS:  MEDICATIONS (STANDING):  amLODIPine Tablet 2.5 milliGRAM(s) Oral daily  atenolol  Tablet 25 milliGRAM(s) Oral daily  atorvastatin 10 milliGRAM(s) Oral at bedtime  enoxaparin Injectable 40 milliGRAM(s) SubCutaneous every 24 hours  lisinopril 10 milliGRAM(s) Oral daily  montelukast 10 milliGRAM(s) Oral daily  pantoprazole Tablet 40 milliGRAM(s) Oral before breakfast    MEDICATIONS (PRN):  albuterol for asthma  loratadine for seasonal allergies  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   magnesium supplement for foot cramps    ALLERGIES:    narcotic analgesics (Other)    LABS:                        9.2    9.60  )-----------( 427      ( 12 Nov 2024 10:52 )             29.0     11-12    133[L]  |  99  |  17  ----------------------------<  118[H]  4.9   |  27  |  0.75    Ca    9.4      12 Nov 2024 10:52    TPro  7.4  /  Alb  3.4  /  TBili  0.2  /  DBili  x   /  AST  47[H]  /  ALT  38  /  AlkPhos  226[H]  11-12      Urinalysis Basic - ( 12 Nov 2024 10:52 )    Color: x / Appearance: x / SG: x / pH: x  Gluc: 118 mg/dL / Ketone: x  / Bili: x / Urobili: x   Blood: x / Protein: x / Nitrite: x   Leuk Esterase: x / RBC: x / WBC x   Sq Epi: x / Non Sq Epi: x / Bacteria: x      CAPILLARY BLOOD GLUCOSE          RADIOLOGY & ADDITIONAL TESTS: Reviewed. OVERNIGHT EVENTS: No acute events.    SUBJECTIVE / INTERVAL HPI: 80 y/o female, PMH HTN, HLD, remote ovarian cancer, and unresectable cholangiocarcinoma on chemo s/p embolization now presenting with fevers, seen and examined at bedside. Pt reports that the fevers were recently occurring twice per day, once in the afternoon/evening and once at night. She reports that since the onset of these intermittent fevers, her maximum temperature reading at home was 102F. She states that she did not feel feverish yesterday. She does mention that she had night sweats last night. She also reports some loss of appetite, mild nausea, and vague abdominal pain. Pt describes the abdominal pain as being "in the muscles of the wall" and states that it presents at different locations around the abdomen intermittently. She states that the pain is aggravated when standing from sitting as well as when sitting from standing. She reports that she has completed 4 out of 6 of her scheduled chemotherapy cycles. She was unable to attend an appointment on 10/31/24 for her 5th cycle due to her recent symptoms. She states that she will be cancelling her upcoming appointment for chemotherapy tomorrow as well. 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     Denies chest pain, shortness of breath, cough, congestion, vomiting, diarrhea, sore throat, odynophagia, dysuria, change in urination pattern, rash.    PHYSICAL EXAM:    General: Well-appearing, no apparent distress  HEENT: Anicteric sclera  Cardiovascular: Regular rate and rhythm, +S1/S2  Respiratory: Clear to auscultation B/L; no wheezes, rales, or rhonchi; pt breathing without difficulty and speaking in full sentences  Gastrointestinal: Some firmness at the upper epigastric region and right upper quadrant, otherwise soft; mild tenderness to palpation at the left lower quadrant; liver palpable; +BSx4  Extremities: warm and well perfused; no edema, clubbing, or cyanosis  Vascular: Distal pulses palpable B/L  Neurological: A&Ox3; no focal deficits  Psychiatric: Pleasant mood and affect  Dermatologic: No appreciable wounds, rash, damage to the visible skin; chemo port located at right upper chest without erythema, ecchymosis, rash    VITAL SIGNS:  Vital Signs Last 24 Hrs  T(C): 36.6 (13 Nov 2024 05:54), Max: 36.8 (12 Nov 2024 09:54)  T(F): 97.8 (13 Nov 2024 05:54), Max: 98.3 (12 Nov 2024 09:54)  HR: 83 (13 Nov 2024 05:54) (83 - 119)  BP: 124/74 (13 Nov 2024 05:54) (123/81 - 147/77)  BP(mean): 91 (13 Nov 2024 05:54) (91 - 91)  RR: 18 (13 Nov 2024 05:54) (16 - 18)  SpO2: 99% (13 Nov 2024 05:54) (98% - 100%)    Parameters below as of 13 Nov 2024 05:54  Patient On (Oxygen Delivery Method): room air    MEDICATIONS:  MEDICATIONS (STANDING):  amLODIPine Tablet 2.5 milliGRAM(s) Oral daily  atenolol  Tablet 25 milliGRAM(s) Oral daily  atorvastatin 10 milliGRAM(s) Oral at bedtime  enoxaparin Injectable 40 milliGRAM(s) SubCutaneous every 24 hours  lisinopril 10 milliGRAM(s) Oral daily  montelukast 10 milliGRAM(s) Oral daily  pantoprazole Tablet 40 milliGRAM(s) Oral before breakfast    MEDICATIONS (PRN):  albuterol for asthma  loratadine for seasonal allergies  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   magnesium supplement for foot cramps    ALLERGIES:    narcotic analgesics (Other)    LABS:                        9.2    9.60  )-----------( 427      ( 12 Nov 2024 10:52 )             29.0     11-12    133[L]  |  99  |  17  ----------------------------<  118[H]  4.9   |  27  |  0.75    Ca    9.4      12 Nov 2024 10:52    TPro  7.4  /  Alb  3.4  /  TBili  0.2  /  DBili  x   /  AST  47[H]  /  ALT  38  /  AlkPhos  226[H]  11-12      Urinalysis Basic - ( 12 Nov 2024 10:52 )    Color: x / Appearance: x / SG: x / pH: x  Gluc: 118 mg/dL / Ketone: x  / Bili: x / Urobili: x   Blood: x / Protein: x / Nitrite: x   Leuk Esterase: x / RBC: x / WBC x   Sq Epi: x / Non Sq Epi: x / Bacteria: x      CAPILLARY BLOOD GLUCOSE      RADIOLOGY & ADDITIONAL TESTS: Reviewed. OVERNIGHT EVENTS: No acute events.    SUBJECTIVE / INTERVAL HPI: Pt seen and examined at bedside. Pt reports that the fevers were recently occurring twice per day, once in the afternoon/evening and once at night. She reports that since the onset of these intermittent fevers, her maximum temperature reading at home was 102F. She states that she did not feel feverish yesterday. She does mention that she had night sweats last night. She also reports some loss of appetite, mild nausea, and vague abdominal pain. Pt describes the abdominal pain as being "in the muscles of the wall" and states that it presents at different locations around the abdomen intermittently. She states that the pain is aggravated when standing from sitting as well as when sitting from standing. She reports that she has completed 4 out of 6 of her scheduled chemotherapy cycles. She was unable to attend an appointment on 10/31/24 for her 5th cycle due to her recent symptoms. She states that she will be cancelling her upcoming appointment for chemotherapy tomorrow as well. 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     Denies chest pain, shortness of breath, cough, congestion, vomiting, diarrhea, sore throat, odynophagia, dysuria, change in urination pattern, rash.    PHYSICAL EXAM:    General: Well-appearing, no apparent distress  HEENT: Anicteric sclera  Cardiovascular: Regular rate and rhythm, +S1/S2  Respiratory: Clear to auscultation B/L; no wheezes, rales, or rhonchi; pt breathing without difficulty and speaking in full sentences  Gastrointestinal: Some firmness at the upper epigastric region and right upper quadrant, otherwise soft; mild tenderness to palpation at the left lower quadrant; hepatomegaly present; +BSx4  Extremities: warm and well perfused; no edema, clubbing, or cyanosis  Vascular: Distal pulses palpable B/L  Neurological: A&Ox3; no focal deficits  Psychiatric: Pleasant mood and affect  Dermatologic: No appreciable wounds, rash, damage to the visible skin; chemo port located at right upper chest without erythema, ecchymosis, rash    VITAL SIGNS:  Vital Signs Last 24 Hrs  T(C): 36.6 (13 Nov 2024 05:54), Max: 36.8 (12 Nov 2024 09:54)  T(F): 97.8 (13 Nov 2024 05:54), Max: 98.3 (12 Nov 2024 09:54)  HR: 83 (13 Nov 2024 05:54) (83 - 119)  BP: 124/74 (13 Nov 2024 05:54) (123/81 - 147/77)  BP(mean): 91 (13 Nov 2024 05:54) (91 - 91)  RR: 18 (13 Nov 2024 05:54) (16 - 18)  SpO2: 99% (13 Nov 2024 05:54) (98% - 100%)    Parameters below as of 13 Nov 2024 05:54  Patient On (Oxygen Delivery Method): room air    MEDICATIONS:  MEDICATIONS (STANDING):  amLODIPine Tablet 2.5 milliGRAM(s) Oral daily  atenolol  Tablet 25 milliGRAM(s) Oral daily  atorvastatin 10 milliGRAM(s) Oral at bedtime  enoxaparin Injectable 40 milliGRAM(s) SubCutaneous every 24 hours  lisinopril 10 milliGRAM(s) Oral daily  montelukast 10 milliGRAM(s) Oral daily  pantoprazole Tablet 40 milliGRAM(s) Oral before breakfast    MEDICATIONS (PRN):  albuterol for asthma  loratadine for seasonal allergies  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   magnesium supplement for foot cramps    ALLERGIES:    narcotic analgesics (Other)    LABS:                        9.2    9.60  )-----------( 427      ( 12 Nov 2024 10:52 )             29.0     11-12    133[L]  |  99  |  17  ----------------------------<  118[H]  4.9   |  27  |  0.75    Ca    9.4      12 Nov 2024 10:52    TPro  7.4  /  Alb  3.4  /  TBili  0.2  /  DBili  x   /  AST  47[H]  /  ALT  38  /  AlkPhos  226[H]  11-12      Urinalysis Basic - ( 12 Nov 2024 10:52 )    Color: x / Appearance: x / SG: x / pH: x  Gluc: 118 mg/dL / Ketone: x  / Bili: x / Urobili: x   Blood: x / Protein: x / Nitrite: x   Leuk Esterase: x / RBC: x / WBC x   Sq Epi: x / Non Sq Epi: x / Bacteria: x      CAPILLARY BLOOD GLUCOSE      RADIOLOGY & ADDITIONAL TESTS: Reviewed.

## 2024-11-13 NOTE — CONSULT NOTE ADULT - TIME BILLING
fever, cholangiocarcinoma
Discussion with patient, review of records, ordering of studies and interpertation.

## 2024-11-13 NOTE — PROGRESS NOTE ADULT - PROBLEM SELECTOR PLAN 1
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 initial ED w/u on 11/1 negative for bacteremia, UTI, progression of malignancy per imaging. given fever + abd pain, ddx liver abscess, sbp, cholangitis, possibly central line infection of chemo port. possibly tumor/chemo fevers. fevers refractory to 5d of augmentin.     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 initial ED w/u on 11/1 negative for bacteremia, UTI, progression of malignancy per imaging. given fever + abd pain, ddx liver abscess, sbp, cholangitis, possibly central line infection of chemo port. possibly tumor/chemo fevers. fevers refractory to 5d of augmentin.     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 initial ED w/u on 11/1 negative for bacteremia, UTI, progression of malignancy per imaging. given fever + abd pain, ddx liver abscess, sbp, cholangitis, possibly central line infection of chemo port. possibly tumor/chemo fevers. fevers refractory to 5d of augmentin.     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 initial ED w/u on 11/1 negative for bacteremia, UTI, progression of malignancy per imaging. given fever + abd pain, ddx liver abscess, sbp, cholangitis, possibly central line infection of chemo port. possibly tumor/chemo fevers. fevers refractory to 5d of augmentin.   Since presenting, pt has been afebrile, though reports night sweats last night.  CXR unremarkable. UA largely unremarkable. COVID, flu, RSV neg. Hepatitis neg. HIV neg.  RUQ/abdominal u/s 11/13: Since November 1, 2024, no significant change since the CT exam. Left hepatic vein again not visualized. Left portal vein branch not definitely visualized. Unchanged intrahepatic ductal dilatation and hepatic masses consistent with known cholangiocarcinoma.    Plan  - monitor off ABX  - ID consulted, f/u recs  - f/u further heme/onc recs  - monitor leukocytes  - f/u full RVP  - f/u blood cultures  - f/u urine cultures  - f/u urine strep and legionella

## 2024-11-13 NOTE — PROGRESS NOTE ADULT - PROBLEM SELECTOR PLAN 3
on home montelukast 10mg qD, continue with this on home montelukast 10mg QD, albuterol prn    Plan  - c/w home meds

## 2024-11-13 NOTE — CONSULT NOTE ADULT - ATTENDING COMMENTS
79F h/o unresectable cholangiocarcinoma s/p radioembolization x 2 currently on chemo/immunotherapy (last chemo 10/24/24), vestibular neuritis p/w fever and abdominal pain x 2 weeks.  Patient was diagnosed with chlangiocarcinoma in 3/2024.  She had radioembolization on 5/1/24 and 6/10/24. She was started on chemo with immunotherapy.  She had cycle 5 on 10/24/24 (gemcitabine, cisplatin, durvalumab).  On 10/29, she started to have fever of 101 a/w intermittent vague abdominal pain. She didn't have other symptoms - denied sore throat, chills, n/v/d, CP, SOB, dysuria, rhinorrhea.  Denied any travel or outdoor activity due to cancer, and she always wears mask when she goes outside.  Denied sick contacts.  She came to ED on 11/1 for work-up which was unremarkable, including WBC 7.9, UA negative, BCx neg, CT a/p with decreased tumor.  She was discharged with Augmentin.  She took Augmentin x 5 days without any help and caused more abdominal pain so it was discontinued. Her fever curve may have improved after, now to 100.  In general she felt more tired and weak so she came to the hospital for work-up. Upon arrival, she was afebrile, VSS, lab notable for WBC 9.6, fluvid neg, UCx NUF, BCx ngtd, mono neg, HIV neg, CXR clear, US abdomen unchanged.  She has been afebrile since admission.   Fever of unclear etiology, suspect tumor fever in setting of treatment response (necrotic tumor?).  No e/o liver abscess and so far infectious work-up unremarkable.  Monitor off abx.  Agree with obtaining BCx from chemo port (please send BCx today).  Obtain MRI liver.  f/u BCx.    Team 1 will follow you.  Case d/w primary team.    Jeanie Davidson MD, MS  Infectious Disease attending  office phone [phone]  For any questions during evening/weekend/holiday, please page ID on call
Agree with fellow note as documented above.  79 year old female with recent history cholangiocarcinoma on TOPAZ-1 regimen, with local therapy in the form of y90 x 2, most recently 6/24, presenting with ongoing fevers and abdominal pain. CT imaging during prior presentation unremarkable, mild abdominal TTP which is acute on chronic. Abdomen is hard and rigid with clear liver edge. LFTs unremarkable, lowers suspicion of intra-hepatic process. However, given refractory fevers should obtain dedicated hepatic imaging such as RUQ u/s to ensure no abscess formation. May require more advanced biliary imaging if fevers continue while inpatient. ID consultation appreciated. No role for line removal at this time, but may consider following ID consult. Should obtain blood cultures at a minimum.    Bernabe No

## 2024-11-13 NOTE — PROGRESS NOTE ADULT - PROBLEM SELECTOR PLAN 2
active malignancy, following Nolberto, seen most recently several days ago, aware of ongoing fevers. s/p embolization in May and on active chemo, last chem was end of october, next dose planned for later this month pending fever w/u    Plan  - f/u heme/onc recs Active malignancy, following Dr. Arvizu, seen most recently several days ago, aware of ongoing fevers. Pt was due for 6 cycles of gemcitabine/cisplatin/durvalumab, however only completed 4 out of 6.  S/p embolization in May with IR and on active chemo, last chem was end of October next dose planned for later this month pending fever w/u.  Per pt, she is also to follow up with a surgeon regarding possible surgical resection.    Plan  - f/u heme/onc recs  - nutrition consult for anorexia and mild nausea

## 2024-11-14 ENCOUNTER — APPOINTMENT (OUTPATIENT)
Dept: INFUSION THERAPY | Facility: CLINIC | Age: 79
End: 2024-11-14

## 2024-11-14 LAB
ALBUMIN SERPL ELPH-MCNC: 2.8 G/DL — LOW (ref 3.3–5)
ALP SERPL-CCNC: 245 U/L — HIGH (ref 40–120)
ALT FLD-CCNC: 42 U/L — SIGNIFICANT CHANGE UP (ref 10–45)
ANION GAP SERPL CALC-SCNC: 11 MMOL/L — SIGNIFICANT CHANGE UP (ref 5–17)
AST SERPL-CCNC: 49 U/L — HIGH (ref 10–40)
BASOPHILS # BLD AUTO: 0.06 K/UL — SIGNIFICANT CHANGE UP (ref 0–0.2)
BASOPHILS NFR BLD AUTO: 0.5 % — SIGNIFICANT CHANGE UP (ref 0–2)
BILIRUB SERPL-MCNC: 0.2 MG/DL — SIGNIFICANT CHANGE UP (ref 0.2–1.2)
BUN SERPL-MCNC: 19 MG/DL — SIGNIFICANT CHANGE UP (ref 7–23)
CALCIUM SERPL-MCNC: 8.7 MG/DL — SIGNIFICANT CHANGE UP (ref 8.4–10.5)
CHLORIDE SERPL-SCNC: 98 MMOL/L — SIGNIFICANT CHANGE UP (ref 96–108)
CO2 SERPL-SCNC: 21 MMOL/L — LOW (ref 22–31)
CREAT SERPL-MCNC: 0.76 MG/DL — SIGNIFICANT CHANGE UP (ref 0.5–1.3)
EGFR: 81 ML/MIN/1.73M2 — SIGNIFICANT CHANGE UP
EOSINOPHIL # BLD AUTO: 0.02 K/UL — SIGNIFICANT CHANGE UP (ref 0–0.5)
EOSINOPHIL NFR BLD AUTO: 0.2 % — SIGNIFICANT CHANGE UP (ref 0–6)
ERYTHROCYTE [SEDIMENTATION RATE] IN BLOOD: 129 MM/HR — HIGH
GLUCOSE SERPL-MCNC: 123 MG/DL — HIGH (ref 70–99)
HCT VFR BLD CALC: 23.9 % — LOW (ref 34.5–45)
HGB BLD-MCNC: 7.4 G/DL — LOW (ref 11.5–15.5)
IMM GRANULOCYTES NFR BLD AUTO: 4.2 % — HIGH (ref 0–0.9)
LYMPHOCYTES # BLD AUTO: 0.87 K/UL — LOW (ref 1–3.3)
LYMPHOCYTES # BLD AUTO: 7 % — LOW (ref 13–44)
MAGNESIUM SERPL-MCNC: 1.8 MG/DL — SIGNIFICANT CHANGE UP (ref 1.6–2.6)
MCHC RBC-ENTMCNC: 31 G/DL — LOW (ref 32–36)
MCHC RBC-ENTMCNC: 31 PG — SIGNIFICANT CHANGE UP (ref 27–34)
MCV RBC AUTO: 100 FL — SIGNIFICANT CHANGE UP (ref 80–100)
MONOCYTES # BLD AUTO: 1.13 K/UL — HIGH (ref 0–0.9)
MONOCYTES NFR BLD AUTO: 9.1 % — SIGNIFICANT CHANGE UP (ref 2–14)
NEUTROPHILS # BLD AUTO: 9.79 K/UL — HIGH (ref 1.8–7.4)
NEUTROPHILS NFR BLD AUTO: 79 % — HIGH (ref 43–77)
NRBC # BLD: 0 /100 WBCS — SIGNIFICANT CHANGE UP (ref 0–0)
PHOSPHATE SERPL-MCNC: 3 MG/DL — SIGNIFICANT CHANGE UP (ref 2.5–4.5)
PLATELET # BLD AUTO: 415 K/UL — HIGH (ref 150–400)
POTASSIUM SERPL-MCNC: 4.5 MMOL/L — SIGNIFICANT CHANGE UP (ref 3.5–5.3)
POTASSIUM SERPL-SCNC: 4.5 MMOL/L — SIGNIFICANT CHANGE UP (ref 3.5–5.3)
PROT SERPL-MCNC: 6.3 G/DL — SIGNIFICANT CHANGE UP (ref 6–8.3)
RBC # BLD: 2.39 M/UL — LOW (ref 3.8–5.2)
RBC # FLD: 15.8 % — HIGH (ref 10.3–14.5)
SODIUM SERPL-SCNC: 130 MMOL/L — LOW (ref 135–145)
WBC # BLD: 12.39 K/UL — HIGH (ref 3.8–10.5)
WBC # FLD AUTO: 12.39 K/UL — HIGH (ref 3.8–10.5)

## 2024-11-14 PROCEDURE — 99221 1ST HOSP IP/OBS SF/LOW 40: CPT

## 2024-11-14 PROCEDURE — 99232 SBSQ HOSP IP/OBS MODERATE 35: CPT | Mod: GC

## 2024-11-14 PROCEDURE — 74183 MRI ABD W/O CNTR FLWD CNTR: CPT | Mod: 26

## 2024-11-14 PROCEDURE — 99232 SBSQ HOSP IP/OBS MODERATE 35: CPT

## 2024-11-14 RX ORDER — IBUPROFEN 200 MG
200 TABLET ORAL EVERY 8 HOURS
Refills: 0 | Status: DISCONTINUED | OUTPATIENT
Start: 2024-11-14 | End: 2024-11-19

## 2024-11-14 RX ORDER — IBUPROFEN 200 MG
400 TABLET ORAL ONCE
Refills: 0 | Status: DISCONTINUED | OUTPATIENT
Start: 2024-11-14 | End: 2024-11-14

## 2024-11-14 RX ORDER — IBUPROFEN 200 MG
200 TABLET ORAL ONCE
Refills: 0 | Status: COMPLETED | OUTPATIENT
Start: 2024-11-14 | End: 2024-11-14

## 2024-11-14 RX ADMIN — Medication 200 MILLIGRAM(S): at 17:43

## 2024-11-14 RX ADMIN — Medication 10 MILLIGRAM(S): at 21:58

## 2024-11-14 RX ADMIN — Medication 200 MILLIGRAM(S): at 16:43

## 2024-11-14 RX ADMIN — ATENOLOL 25 MILLIGRAM(S): 100 TABLET ORAL at 06:53

## 2024-11-14 RX ADMIN — Medication 200 MILLIGRAM(S): at 07:26

## 2024-11-14 RX ADMIN — MONTELUKAST SODIUM 10 MILLIGRAM(S): 10 TABLET ORAL at 21:58

## 2024-11-14 RX ADMIN — LORAZEPAM 0.5 MILLIGRAM(S): 2 TABLET ORAL at 18:43

## 2024-11-14 RX ADMIN — PANTOPRAZOLE SODIUM 40 MILLIGRAM(S): 40 TABLET, DELAYED RELEASE ORAL at 06:54

## 2024-11-14 RX ADMIN — Medication 200 MILLIGRAM(S): at 06:56

## 2024-11-14 NOTE — PROGRESS NOTE ADULT - PROBLEM SELECTOR PLAN 7
D: regular  E: replete prn  DVT: lovenox 40mg qD  F: tolerating PO  Dispo: F Home meds: atorvastatin 10mg qd    Plan  - c/w home atorvastatin 10mg qd

## 2024-11-14 NOTE — DIETITIAN INITIAL EVALUATION ADULT - PROBLEM SELECTOR PLAN 1
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 initial ED w/u on 11/1 negative for bacteremia, UTI, progression of malignancy per imaging. given fever + abd pain, ddx liver abscess, sbp, cholangitis, possibly central line infection of chemo port. possibly tumor/chemo fevers. fevers refractory to 5d of augmentin.     Plan  - monitor off ABX  -f/u ID recs  - f/u RUQ US to evaluate for liver abscess  - f/u abd US to evaluate for ascites  - f/u further heme/onc recs

## 2024-11-14 NOTE — PROGRESS NOTE ADULT - PROBLEM SELECTOR PLAN 1
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 initial ED w/u on 11/1 negative for bacteremia, UTI, progression of malignancy per imaging. given fever + abd pain, ddx liver abscess, sbp, cholangitis, possibly central line infection of chemo port. possibly tumor/chemo fevers. fevers refractory to 5d of augmentin.   Since presenting, pt has been afebrile, though reports night sweats last night.  CXR unremarkable. UA largely unremarkable. COVID, flu, RSV neg. Hepatitis neg. HIV neg.  RUQ/abdominal u/s 11/13: Since November 1, 2024, no significant change since the CT exam. Left hepatic vein again not visualized. Left portal vein branch not definitely visualized. Unchanged intrahepatic ductal dilatation and hepatic masses consistent with known cholangiocarcinoma.    Plan  - monitor off ABX; if pt starts to have worsening symptoms start zosyn  - plan for MRI (gadolinium enhanced) today for further characterization of liver lesions + r/o liver abscess or biloma as source of fever  - f/u further heme/onc recs   - f/u full RVP - negative  - f/u blood cultures - 24 hour no growth  - f/u urine cultures - normal urogenital pedrito  - f/u urine strep and legionella - streptococcus pneumoniae negative, legionella pending  - SW consulted - pt arranging private home care 2 wks of episodic fevers, with active cholangiocarcinoma on chemo following Wozey, with simultaneous new moderate, episodic abd pain that migrates around abd, no leukocytosis but on chemo. initial ED w/u on 11/1 negative for bacteremia, UTI, progression of malignancy per imaging. given fever + abd pain, ddx liver abscess, sbp, cholangitis, possibly central line infection of chemo port. Possibly tumor/chemo fevers. Fevers refractory to 5d of augmentin.   CXR unremarkable. UA largely unremarkable, ucx growing normal urogenital pedrito. Urine strep neg. COVID, flu, RSV neg. Full RVP neg. Hepatitis neg. HIV neg.  RUQ/abdominal u/s 11/13: Since November 1, 2024, no significant change since the CT exam. Left hepatic vein again not visualized. Left portal vein branch not definitely visualized. Unchanged intrahepatic ductal dilatation and hepatic masses consistent with known cholangiocarcinoma.  Pt with fever to 100.6F 11/14 with subjective chills.    Plan  - monitor off ABX; per ID, if pt starts to have fever > 101F, hemodynamic instability, or leukocytosis, low threshold to start ceftriaxone 1g IV q24h and flagyl 500mg PO q12h  - f/u MRI liver protocol (gadolinium enhanced) for further characterization of liver lesions + r/o liver abscess or biloma as source of fever  - f/u further heme/onc recs   - f/u blood cultures draw in ED - NGTD  - f/u blood cultures from chemoport  - f/u urine legionella

## 2024-11-14 NOTE — DIETITIAN INITIAL EVALUATION ADULT - EDUCATION DIETARY MODIFICATIONS
Educated on strategies to promote nutritional status in setting of cancer/treatment. Discussed protein-rich foods, importance of hydration, nutrition therapy for constipation, food safety, strategies to mitigate nausea, and use of oral nutrition supplements between meals prn. Pt aware RD remains available for additional questions/concerns./(2) meets goals/outcomes/verbalization

## 2024-11-14 NOTE — DIETITIAN INITIAL EVALUATION ADULT - PROBLEM SELECTOR PLAN 5
on home amlodipine benzapril 2.5-10mg qD, atenolol 25mg qD. we don't have benzapril, so switch to lisinopril 10mg and amlodipine 2.5mg qD

## 2024-11-14 NOTE — PROGRESS NOTE ADULT - SUBJECTIVE AND OBJECTIVE BOX
OVERNIGHT EVENTS:    SUBJECTIVE / INTERVAL HPI: Patient seen and examined at bedside.       PHYSICAL EXAM:    General: NAD  HEENT: NC/AT; PERRL, anicteric sclera; MMM  Neck: supple  Cardiovascular: +S1/S2, RRR  Respiratory: CTA B/L; no W/R/R  Gastrointestinal: soft, NT/ND; +BSx4  Extremities: WWP; no edema, clubbing or cyanosis  Vascular: 2+ radial, DP/PT pulses B/L  Neurological: AAOx3; no focal deficits  Psychiatric: pleasant mood and affect  Dermatologic: no appreciable wounds or damage to the skin    VITAL SIGNS:  Vital Signs Last 24 Hrs  T(C): 38.1 (14 Nov 2024 06:16), Max: 38.1 (14 Nov 2024 06:16)  T(F): 100.6 (14 Nov 2024 06:16), Max: 100.6 (14 Nov 2024 06:16)  HR: 97 (14 Nov 2024 06:16) (80 - 100)  BP: 134/72 (14 Nov 2024 06:16) (114/73 - 137/80)  BP(mean): 93 (14 Nov 2024 06:16) (87 - 93)  RR: 17 (14 Nov 2024 06:16) (17 - 19)  SpO2: 97% (14 Nov 2024 06:16) (97% - 99%)    Parameters below as of 14 Nov 2024 06:16  Patient On (Oxygen Delivery Method): room air          MEDICATIONS:  MEDICATIONS  (STANDING):  amLODIPine   Tablet 2.5 milliGRAM(s) Oral daily  atenolol  Tablet 25 milliGRAM(s) Oral daily  atorvastatin 10 milliGRAM(s) Oral at bedtime  enoxaparin Injectable 40 milliGRAM(s) SubCutaneous every 24 hours  ibuprofen  Tablet. 200 milliGRAM(s) Oral once  lisinopril 10 milliGRAM(s) Oral daily  montelukast 10 milliGRAM(s) Oral daily  pantoprazole    Tablet 40 milliGRAM(s) Oral before breakfast    MEDICATIONS  (PRN):  LORazepam     Tablet 0.5 milliGRAM(s) Oral once PRN Before MRI  polyethylene glycol 3350 17 Gram(s) Oral two times a day PRN Constipation  senna 2 Tablet(s) Oral at bedtime PRN Constipation      ALLERGIES:  Allergies    narcotic analgesics (Other)    Intolerances        LABS:                        8.0    9.08  )-----------( 384      ( 13 Nov 2024 05:30 )             25.6     11-13    135  |  101  |  17  ----------------------------<  116[H]  4.6   |  25  |  0.72    Ca    9.2      13 Nov 2024 05:30  Phos  3.8     11-13  Mg     2.1     11-13    TPro  6.5  /  Alb  3.0[L]  /  TBili  0.2  /  DBili  <0.2  /  AST  46[H]  /  ALT  39  /  AlkPhos  232[H]  11-13      Urinalysis Basic - ( 13 Nov 2024 05:30 )    Color: x / Appearance: x / SG: x / pH: x  Gluc: 116 mg/dL / Ketone: x  / Bili: x / Urobili: x   Blood: x / Protein: x / Nitrite: x   Leuk Esterase: x / RBC: x / WBC x   Sq Epi: x / Non Sq Epi: x / Bacteria: x      CAPILLARY BLOOD GLUCOSE          RADIOLOGY & ADDITIONAL TESTS: Reviewed. OVERNIGHT EVENTS: Pt developed a fever of 100.6F. A repeat blood culture was not done because the pt had one done earlier that day. Pt was given ibuprofen for the fever.    SUBJECTIVE / INTERVAL HPI: Patient seen and examined at bedside. Pt mentions that she developed a fever last night, which was preceded by chills. She states that she did not experience night sweats. Pt also reports that she had a small bowel movement yesterday after being administered senna, though she reports straining for approximately 15 minutes. She states that prior to this she was constipated for 3-4 days, but she normally has a bowel movement daily even while she is on chemotherapy. She reports that 1-2 hours after this bowel movement the fever began. Pt states that her abdominal pain today is "good" and shares that her appetite is better and she is looking forward to breakfast. She would still like to speak to the nutrition team. Pt reports a mild mild headache which she attributes to her vestibular neuritis and requests Advil. She also requests a consult from social work for help with errands at home.     PHYSICAL EXAM:  General: Well-appearing, no apparent distress  HEENT: Anicteric sclera  Neck: Supple  Cardiovascular: Regular rate and rhythm, +S1/S2  Respiratory: Clear to auscultation B/L; no wheezes, rales, or rhonchi; pt breathing without difficulty and speaking in full sentences  Gastrointestinal: Some firmness at the epigastric region and right upper quadrant, otherwise soft; no tenderness to palpation; hepatomegaly present; +BSx4  Extremities: warm and well perfused; no edema, erythema, edema, rash, clubbing, or cyanosis of visibile skin; bilateral calves nontender  Vascular: Distal pulses palpable B/L  Neurological: A&Ox3; no focal deficits  Psychiatric: Pleasant mood and affect  Dermatologic: No appreciable wounds, rash, damage to the visible skin; chemo port located at right upper chest without erythema, ecchymosis, rash    VITAL SIGNS:  Vital Signs Last 24 Hrs  T(C): 38.1 (14 Nov 2024 06:16), Max: 38.1 (14 Nov 2024 06:16)  T(F): 100.6 (14 Nov 2024 06:16), Max: 100.6 (14 Nov 2024 06:16) - temperature rechecked during morning rounds, 98.1F  HR: 97 (14 Nov 2024 06:16) (80 - 100)  BP: 134/72 (14 Nov 2024 06:16) (114/73 - 137/80)  BP(mean): 93 (14 Nov 2024 06:16) (87 - 93)  RR: 17 (14 Nov 2024 06:16) (17 - 19)  SpO2: 97% (14 Nov 2024 06:16) (97% - 99%)    Parameters below as of 14 Nov 2024 06:16  Patient On (Oxygen Delivery Method): room air    MEDICATIONS:  MEDICATIONS (STANDING):  amLODIPine Tablet 2.5 milliGRAM(s) Oral daily  atenolol Tablet 25 milliGRAM(s) Oral daily  atorvastatin 10 milliGRAM(s) Oral at bedtime  enoxaparin Injectable 40 milliGRAM(s) SubCutaneous every 24 hours  ibuprofen Tablet. 200 milliGRAM(s) Oral once  lisinopril 10 milliGRAM(s) Oral daily  montelukast 10 milliGRAM(s) Oral daily  pantoprazole Tablet 40 milliGRAM(s) Oral before breakfast    MEDICATIONS (PRN):  LORazepam Tablet 0.5 milliGRAM(s) Oral once PRN Before MRI  polyethylene glycol 3350 17 Gram(s) Oral two times a day PRN Constipation  senna 2 Tablet(s) Oral at bedtime PRN Constipation    ALLERGIES:  narcotic analgesics (Other)      LABS:                        8.0    9.08  )-----------( 384      ( 13 Nov 2024 05:30 )             25.6     11-13    135  |  101  |  17  ----------------------------<  116[H]  4.6   |  25  |  0.72    Ca    9.2      13 Nov 2024 05:30  Phos  3.8     11-13  Mg     2.1     11-13    TPro  6.5  /  Alb  3.0[L]  /  TBili  0.2  /  DBili  <0.2  /  AST  46[H]  /  ALT  39  /  AlkPhos  232[H]  11-13      Urinalysis Basic - ( 13 Nov 2024 05:30 )    Color: x / Appearance: x / SG: x / pH: x  Gluc: 116 mg/dL / Ketone: x  / Bili: x / Urobili: x   Blood: x / Protein: x / Nitrite: x   Leuk Esterase: x / RBC: x / WBC x   Sq Epi: x / Non Sq Epi: x / Bacteria: x    CAPILLARY BLOOD GLUCOSE    RADIOLOGY & ADDITIONAL TESTS: Reviewed. OVERNIGHT EVENTS: Ordered bowel regimen. Pt developed a fever of 100.6F. A repeat blood culture was not done because the pt had one done earlier that day. Pt was given ibuprofen for the fever.    SUBJECTIVE / INTERVAL HPI: Patient seen and examined at bedside. Pt resting comfortably. Pt mentions that she developed a fever last night, which was preceded by chills. She states that she did not experience night sweats. Pt also reports that she had a small bowel movement yesterday after being administered senna, though she reports straining for approximately 15 minutes. She states that prior to this she was constipated for 3-4 days, but she normally has a bowel movement daily even while she is on chemotherapy. She reports that 1-2 hours after this bowel movement the fever began. Pt states that her abdominal pain today is "good" and shares that her appetite is better and she is looking forward to breakfast. She would still like to speak to the nutrition team. Pt reports a mild headache which she attributes to her vestibular neuritis and requests Advil. She also requests a consult from social work for help with errands at home. Denies chest pain, SOB, n/v/d, abd pain, dysuria. ROS otherwise negative.    PHYSICAL EXAM:  General: Well-appearing, no apparent distress  HEENT: Anicteric sclera  Neck: Supple  Cardiovascular: Regular rate and rhythm, +S1/S2  Respiratory: Clear to auscultation B/L; no wheezes, rales, or rhonchi; pt breathing without difficulty and speaking in full sentences  Gastrointestinal: Some firmness at the epigastric region and right upper quadrant, otherwise soft; no tenderness to palpation; hepatomegaly present; +BSx4  Extremities: warm and well perfused; no edema, erythema, edema, rash, clubbing, or cyanosis of visibile skin; bilateral calves nontender  Vascular: Distal pulses palpable B/L  Neurological: A&Ox3; no focal deficits  Psychiatric: Pleasant mood and affect  Dermatologic: No appreciable wounds, rash, damage to the visible skin; chemo port located at right upper chest without erythema, ecchymosis, rash    VITAL SIGNS:  Vital Signs Last 24 Hrs  T(C): 38.1 (14 Nov 2024 06:16), Max: 38.1 (14 Nov 2024 06:16)  T(F): 100.6 (14 Nov 2024 06:16), Max: 100.6 (14 Nov 2024 06:16) - temperature rechecked during morning rounds, 98.1F  HR: 97 (14 Nov 2024 06:16) (80 - 100)  BP: 134/72 (14 Nov 2024 06:16) (114/73 - 137/80)  BP(mean): 93 (14 Nov 2024 06:16) (87 - 93)  RR: 17 (14 Nov 2024 06:16) (17 - 19)  SpO2: 97% (14 Nov 2024 06:16) (97% - 99%)    Parameters below as of 14 Nov 2024 06:16  Patient On (Oxygen Delivery Method): room air    MEDICATIONS:  MEDICATIONS (STANDING):  amLODIPine Tablet 2.5 milliGRAM(s) Oral daily  atenolol Tablet 25 milliGRAM(s) Oral daily  atorvastatin 10 milliGRAM(s) Oral at bedtime  enoxaparin Injectable 40 milliGRAM(s) SubCutaneous every 24 hours  ibuprofen Tablet. 200 milliGRAM(s) Oral once  lisinopril 10 milliGRAM(s) Oral daily  montelukast 10 milliGRAM(s) Oral daily  pantoprazole Tablet 40 milliGRAM(s) Oral before breakfast    MEDICATIONS (PRN):  LORazepam Tablet 0.5 milliGRAM(s) Oral once PRN Before MRI  polyethylene glycol 3350 17 Gram(s) Oral two times a day PRN Constipation  senna 2 Tablet(s) Oral at bedtime PRN Constipation    ALLERGIES:  narcotic analgesics (Other)      LABS:                        8.0    9.08  )-----------( 384      ( 13 Nov 2024 05:30 )             25.6     11-13    135  |  101  |  17  ----------------------------<  116[H]  4.6   |  25  |  0.72    Ca    9.2      13 Nov 2024 05:30  Phos  3.8     11-13  Mg     2.1     11-13    TPro  6.5  /  Alb  3.0[L]  /  TBili  0.2  /  DBili  <0.2  /  AST  46[H]  /  ALT  39  /  AlkPhos  232[H]  11-13      Urinalysis Basic - ( 13 Nov 2024 05:30 )    Color: x / Appearance: x / SG: x / pH: x  Gluc: 116 mg/dL / Ketone: x  / Bili: x / Urobili: x   Blood: x / Protein: x / Nitrite: x   Leuk Esterase: x / RBC: x / WBC x   Sq Epi: x / Non Sq Epi: x / Bacteria: x    CAPILLARY BLOOD GLUCOSE    RADIOLOGY & ADDITIONAL TESTS: Reviewed.

## 2024-11-14 NOTE — PROGRESS NOTE ADULT - SUBJECTIVE AND OBJECTIVE BOX
patient febrile this morning, 100.6 (6am), MR liver remains pending, port culture sent, peripheral blood cultures remain pending   seen and examined at bedside this morning , no new complaints, feels overall well, no new symptoms         Oncologic History:  1. ovarian ca - 2007- LUIS/BSO, adjuvant chemotherapy  ?  2. intrahepatic cholangiocarcinoma  --presentation: abdominal discomfort.  --CT abdomen 3/2024: Mass in L lobe of liver with portal vein obstruction. Mass measures 10 x 7 x 9 cm.  --AFP 27, CA 19-9 normal, CEA normal.  --MRI abdomen 4/2024: 11 x 7 cm mass in the L lobe. exophytic component exerting mass efffect on pancreas.  --saw surgical oncologist. Unresectable disease.  --Biopsy: adenocarcinoma. MMR intact by IHC. CK7, CK20 positive. negative CDX2, TTF1, Gata3, PAX8, ER. Favor cholangiocarcinoma.  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 TMB = 5 mut/mB, MSI-H not detected. Additional mutations in ARID1A, ASXL1, MYC, RB1.  --Y-90 radioembolization 5/1/24. 2nd dose on 6/10/24  --pt declined germline genetic testing for BAP1 mutation  --7/15/24 MR Abdomen: Since April 3, 2024, significantly decreased enhancement and slightly decreased size of intrahepatic cholangiocarcinoma in left lobe status post Y-90.  --initiated chemoimmunotherapy with gemcitabine/cisplatin/durvalumab on 8/2/24  --MRI abdomen after cycle 4 on 10/18/24 showed continued mild decreased size and more significantly decreased enhancement treated intrahepatic cholangiocarcinoma. No new suspicious finding.  ?  PMH is notable for ovarian cancer s/p LUIS/BSO followed by adjuvant platinum/taxane based chemotherapy in 2007. Vestibular neuritis  FH: Sister leukemia. Mother breast cancer. Father lymphoma. Paternal grandfather unspecified GI malignancy. Does not have children or other first degree relatives.  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 denies smoking, ETOH. no children.      ANTIBIOTICS/RELEVANT:  MEDICATIONS  (STANDING):  amLODIPine   Tablet 2.5 milliGRAM(s) Oral daily  atenolol  Tablet 25 milliGRAM(s) Oral daily  atorvastatin 10 milliGRAM(s) Oral at bedtime  enoxaparin Injectable 40 milliGRAM(s) SubCutaneous every 24 hours  lisinopril 10 milliGRAM(s) Oral daily  montelukast 10 milliGRAM(s) Oral daily  pantoprazole    Tablet 40 milliGRAM(s) Oral before breakfast    MEDICATIONS  (PRN):  LORazepam     Tablet 0.5 milliGRAM(s) Oral once PRN Before MRI  polyethylene glycol 3350 17 Gram(s) Oral two times a day PRN Constipation  senna 2 Tablet(s) Oral at bedtime PRN Constipation      Vital Signs Last 24 Hrs  T(C): 38.1 (14 Nov 2024 06:16), Max: 38.1 (14 Nov 2024 06:16)  T(F): 100.6 (14 Nov 2024 06:16), Max: 100.6 (14 Nov 2024 06:16)  HR: 97 (14 Nov 2024 06:16) (80 - 100)  BP: 134/72 (14 Nov 2024 06:16) (114/73 - 137/80)  BP(mean): 93 (14 Nov 2024 06:16) (87 - 93)  RR: 17 (14 Nov 2024 06:16) (17 - 19)  SpO2: 97% (14 Nov 2024 06:16) (97% - 99%)    Parameters below as of 14 Nov 2024 06:16  Patient On (Oxygen Delivery Method): room air        PHYSICAL EXAM:  General: in no acute distress  Eyes: EOMI intact bilaterally. Anicteric sclerae, moist conjunctivae  HENT: Moist mucous membranes  Neck: Trachea midline, supple  Lungs: CTA B/L. No wheezes, rales, or rhonchi  Cardiovascular: RRR. No murmurs, rubs, or gallops  Abdomen: Soft, non-tender non-distended; No rebound or guarding  Extremities: WWP, No clubbing, cyanosis or edema  Neurological: Alert and oriented  Skin: Warm and dry. No obvious rash     LABS:                        7.4    12.39 )-----------( 415      ( 14 Nov 2024 08:13 )             23.9     11-14    130[L]  |  98  |  19  ----------------------------<  123[H]  4.5   |  21[L]  |  0.76    Ca    8.7      14 Nov 2024 08:13  Phos  3.0     11-14  Mg     1.8     11-14    TPro  6.3  /  Alb  2.8[L]  /  TBili  0.2  /  DBili  x   /  AST  49[H]  /  ALT  42  /  AlkPhos  245[H]  11-14

## 2024-11-14 NOTE — PROGRESS NOTE ADULT - PROBLEM SELECTOR PLAN 2
Active malignancy, following Dr. Arvizu, seen most recently several days ago, aware of ongoing fevers. Pt was due for 6 cycles of gemcitabine/cisplatin/durvalumab, however only completed 4 out of 6.  S/p embolization in May with IR and on active chemo, last chem was end of October next dose planned for later this month pending fever w/u.  Per pt, she is also to follow up with a surgeon regarding possible surgical resection.    Plan  - f/u heme/onc recs  - nutrition consult pending for anorexia and mild nausea Active malignancy, following Dr. Arvizu, seen most recently several days ago, aware of ongoing fevers. Pt was due for 6 cycles of gemcitabine/cisplatin/durvalumab, however only completed 4 out of 6.  S/p embolization in May with IR and on active chemo, last chem was end of October next dose planned for later this month pending fever w/u.  Per pt, she is also to follow up with a surgeon Dr. Loredo regarding possible surgical resection.    Plan  - f/u heme/onc recs  - nutrition consult pending for anorexia and mild nausea  - pt requesting SW to help set up home care iso inability to complete ADLs from malignancy

## 2024-11-14 NOTE — PROGRESS NOTE ADULT - SUBJECTIVE AND OBJECTIVE BOX
INCOMPLETE NOTE - NOTE IN PROGRESS**  Infectious Disease Progress Note    Interval Events:    Subjective:  Patient seen and evaluated at bedside.    ROS:  negative except as above      ANTIBIOTICS/RELEVANT:  antimicrobials    immunologic:    OTHER:  amLODIPine   Tablet 2.5 milliGRAM(s) Oral daily  atenolol  Tablet 25 milliGRAM(s) Oral daily  atorvastatin 10 milliGRAM(s) Oral at bedtime  enoxaparin Injectable 40 milliGRAM(s) SubCutaneous every 24 hours  lisinopril 10 milliGRAM(s) Oral daily  LORazepam     Tablet 0.5 milliGRAM(s) Oral once PRN  montelukast 10 milliGRAM(s) Oral daily  pantoprazole    Tablet 40 milliGRAM(s) Oral before breakfast  polyethylene glycol 3350 17 Gram(s) Oral two times a day PRN  senna 2 Tablet(s) Oral at bedtime PRN      Allergies    narcotic analgesics (Other)    Intolerances        Objective:  Vital Signs Last 24 Hrs  T(C): 38.1 (14 Nov 2024 06:16), Max: 38.1 (14 Nov 2024 06:16)  T(F): 100.6 (14 Nov 2024 06:16), Max: 100.6 (14 Nov 2024 06:16)  HR: 97 (14 Nov 2024 06:16) (80 - 100)  BP: 134/72 (14 Nov 2024 06:16) (114/73 - 137/80)  BP(mean): 93 (14 Nov 2024 06:16) (87 - 93)  RR: 17 (14 Nov 2024 06:16) (17 - 19)  SpO2: 97% (14 Nov 2024 06:16) (97% - 99%)    Parameters below as of 14 Nov 2024 06:16  Patient On (Oxygen Delivery Method): room air        PHYSICAL EXAM: **NOTE IN PROGRESS**  General: NAD  HEENT: NC/AT; PERRL, clear conjunctiva  Neck: supple  Respiratory: CTA b/l  Cardiovascular: +S1/S2; RRR  Abdomen: soft, NT/ND; +BS x4  Extremities: WWP, 2+ peripheral pulses b/l; no LE edema  Skin: normal color and turgor; no rash  Neurological:       LABS:                        7.4    12.39 )-----------( 415      ( 14 Nov 2024 08:13 )             23.9     11-13    135  |  101  |  17  ----------------------------<  116[H]  4.6   |  25  |  0.72    Ca    9.2      13 Nov 2024 05:30  Phos  3.8     11-13  Mg     2.1     11-13    TPro  6.5  /  Alb  3.0[L]  /  TBili  0.2  /  DBili  <0.2  /  AST  46[H]  /  ALT  39  /  AlkPhos  232[H]  11-13      Urinalysis Basic - ( 13 Nov 2024 05:30 )    Color: x / Appearance: x / SG: x / pH: x  Gluc: 116 mg/dL / Ketone: x  / Bili: x / Urobili: x   Blood: x / Protein: x / Nitrite: x   Leuk Esterase: x / RBC: x / WBC x   Sq Epi: x / Non Sq Epi: x / Bacteria: x        MICROBIOLOGY:            RECENT CULTURES:  11-12 @ 10:12  .Blood BLOOD  --  --  --    No growth at 24 hours  --      RADIOLOGY & ADDITIONAL STUDIES: Infectious Disease Progress Note    Interval Events: spike fever 100.9, WBC uptrending     Subjective:  Patient seen and evaluated at bedside.    ROS:  negative except as above      ANTIBIOTICS/RELEVANT:  antimicrobials    immunologic:    OTHER:  amLODIPine   Tablet 2.5 milliGRAM(s) Oral daily  atenolol  Tablet 25 milliGRAM(s) Oral daily  atorvastatin 10 milliGRAM(s) Oral at bedtime  enoxaparin Injectable 40 milliGRAM(s) SubCutaneous every 24 hours  lisinopril 10 milliGRAM(s) Oral daily  LORazepam     Tablet 0.5 milliGRAM(s) Oral once PRN  montelukast 10 milliGRAM(s) Oral daily  pantoprazole    Tablet 40 milliGRAM(s) Oral before breakfast  polyethylene glycol 3350 17 Gram(s) Oral two times a day PRN  senna 2 Tablet(s) Oral at bedtime PRN      Allergies    narcotic analgesics (Other)    Intolerances        Objective:  Vital Signs Last 24 Hrs  T(C): 38.1 (14 Nov 2024 06:16), Max: 38.1 (14 Nov 2024 06:16)  T(F): 100.6 (14 Nov 2024 06:16), Max: 100.6 (14 Nov 2024 06:16)  HR: 97 (14 Nov 2024 06:16) (80 - 100)  BP: 134/72 (14 Nov 2024 06:16) (114/73 - 137/80)  BP(mean): 93 (14 Nov 2024 06:16) (87 - 93)  RR: 17 (14 Nov 2024 06:16) (17 - 19)  SpO2: 97% (14 Nov 2024 06:16) (97% - 99%)    Parameters below as of 14 Nov 2024 06:16  Patient On (Oxygen Delivery Method): room air        PHYSICAL EXAM:  Constitutional: alert, NAD  Eyes: the sclera and conjunctiva were normal.   ENT: the ears and nose were normal in appearance.   Neck: the appearance of the neck was normal and the neck was supple.   Pulmonary: no respiratory distress and lungs were clear to auscultation bilaterally.   Heart: heart rate was normal and rhythm regular, normal S1 and S2  Vascular:. there was no peripheral edema  Abdomen: normal bowel sounds, soft, non-tender  Neurological: no focal deficits.   Psychiatric: the affect was normal      LABS:                        7.4    12.39 )-----------( 415      ( 14 Nov 2024 08:13 )             23.9     11-13    135  |  101  |  17  ----------------------------<  116[H]  4.6   |  25  |  0.72    Ca    9.2      13 Nov 2024 05:30  Phos  3.8     11-13  Mg     2.1     11-13    TPro  6.5  /  Alb  3.0[L]  /  TBili  0.2  /  DBili  <0.2  /  AST  46[H]  /  ALT  39  /  AlkPhos  232[H]  11-13      Urinalysis Basic - ( 13 Nov 2024 05:30 )    Color: x / Appearance: x / SG: x / pH: x  Gluc: 116 mg/dL / Ketone: x  / Bili: x / Urobili: x   Blood: x / Protein: x / Nitrite: x   Leuk Esterase: x / RBC: x / WBC x   Sq Epi: x / Non Sq Epi: x / Bacteria: x        MICROBIOLOGY:            RECENT CULTURES:  11-12 @ 10:12  .Blood BLOOD  --  --  --    No growth at 24 hours  --      RADIOLOGY & ADDITIONAL STUDIES:

## 2024-11-14 NOTE — PROGRESS NOTE ADULT - PROBLEM SELECTOR PLAN 4
chronic since starting chemo, on home prilosec.     Plan  - continue with home meds on home montelukast 10mg QD, albuterol prn    Plan  - c/w home meds

## 2024-11-14 NOTE — PROGRESS NOTE ADULT - ASSESSMENT
79F PMHx unresectable intrahepatic cholangiocarcinoma on gem/cis/durvalumab s/p Y90 embolization 05 and 06/24, HTN, HLD presenting with several weeks of fever, failure to thrive and abdominal pain.    # Unresectable intrahepatic cholangiocarcinoma  s/p 5 cycles of gem/cis/durvalumab -- plan maintenance durvalumab following C6 if cleared from infectious standpoint     # Fever >2wks in an immunocompromised patient refractory to augmentin (last febrile 11/14)  -- concerning for liver abscess v line infection v other infection  -- RUQ ultrasound 11/12 w/o abscess   -- ID following, pending MR liver and culture data     Patient seen and d/w hematology/oncology attending, Dr Bernabe No

## 2024-11-14 NOTE — DIETITIAN INITIAL EVALUATION ADULT - ADD RECOMMEND
1. Continue Regular diet.   >>Encourage & monitor PO intake. [place] dietary preferences as able.   2. Monitor GI tolerance, weight trends, labs, & skin integrity.  3. Defer bowel and pain regimens to team.   4. RD to remain available for diet education/intervention prn.

## 2024-11-14 NOTE — CONSULT NOTE ADULT - SUBJECTIVE AND OBJECTIVE BOX
80 yo F PMHx ovarian ca, recent dx of unresectable intrahepatic cholangiocarcinoma (dx april 2024), s/p y90 ekmbolization (05 and 06/24) x2, s/p 5 cycles of gem/cis/durvalumab, most recently 10/24, since she had been having low grade fevers on and off since. Seen on 11/1 in ED, workup was negative and was dc with 7 day course of Augmentin Fevers remained and was admitted for further workup. Not having any additional sx. VS wnl. Low grade fevers. On exam, soft, mildly distended abd, nontender. Labs were normal, except WBC 12 today from 9. with mild transaminitis. UA, hepatitis panel, RVP, BCx all normal. CT CAP  Re-demonstration of an 11.1 x 7.3 cm hypodense heterogeneous mass centered in the left hepatic lobe, corresponding to the patient's treated cholangiocarcinoma. Since the prior MRI of 10/18/2024, there is progressive non-enhancement of the surrounding parenchyma of segments 2 and 3. The left and middle hepatic veins as well as left portal vein are not visualized. Right portal and main portal veins are patent. Right hepatic vein is patent.    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

## 2024-11-14 NOTE — DIETITIAN INITIAL EVALUATION ADULT - OTHER INFO
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     Pt seen on 7WO for assessment. Labs and medication orders reviewed. Na 130 <low>, K/Mg/Phos WNL. On Regular diet. Pt reports good intake at baseline, notes decreased appetite for a few weeks PTA in setting of fevers/feeling unwell. Diet recall: scrambled eggs or omelette, cheese and crackers or toast with cheese, a few other light snacks. Notes intake improving since admission, RD observed pt having oatmeal and eggs for breakfast this AM. 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     Pt seen on 7WO for assessment. Labs and medication orders reviewed. Na 130 <low>, K/Mg/Phos WNL. On Regular diet. Pt reports good intake at baseline, notes decreased appetite for a few weeks PTA in setting of fevers/feeling unwell. Diet recall: scrambled eggs or omelette, cheese and crackers or toast with cheese, a few other light snacks. Notes intake improving since admission, RD observed pt having oatmeal and eggs for breakfast this AM. Pt reports UBW ~166lb in 03/2024; stated dosing wt 156lb/70.8kg, RD obtained bed scale wt this AM 159lb indicates 7lb/4% wt change over x8 months - not clinically significant. No overt signs of wasting identified. Per ASPEN guidelines, pt does not meet objective criteria for malnutrition at this time. Pt denies difficulty chewing/swallowing. Reports intermittent nausea without vomiting at home, no nausea or pain reported at this time. Endorses constipation with last BM yesterday - bowel regimen ordered. Confirms no known food allergies. No Protestant/ethnic/cultural food preferences noted. No pressure injuries or edema documented, Amanuel score 22. RD reviewed formulary, pt prefers no oral nutrition supplements unless appetite declines. See nutrition recommendations. RD to remain available.

## 2024-11-14 NOTE — PROGRESS NOTE ADULT - PROBLEM SELECTOR PLAN 5
on home amlodipine benzapril 2.5-10mg qD, atenolol 25mg qD.     Plan  - c/w atenolol 25mg qd  - c/w lisinopril 10mg for benzapril TI  - c/w amlodipine 2.5mg qD chronic since starting chemo, on home prilosec.     Plan  - continue with home meds

## 2024-11-14 NOTE — DIETITIAN INITIAL EVALUATION ADULT - BODY MASS INDEX
12/01/21 Update CM Note; POD 1 kyphoplasty. Precert started with insurance [facility]. Pt 11/24 and OT 12/24. Will follow.  Electronically signed by Joe Dietz RN CM on 12/1/2021 at 3:24 PM 23

## 2024-11-14 NOTE — CONSULT NOTE ADULT - ASSESSMENT
79F recent dx of unresectable intrahepatic cholangiocarcinoma (dx April 2024), s/p y90 embolization (05 and 06/24) x2, s/p 5 cycles of gem/cis/durvalumab, admitted to medicine service for fevers of unknown origin, infectious workup negative thus far. Heme-onc and infectious disease following, plan to evaluate patient off antibiotics, considering tumor cell necrosis as possible source of fevers. No acute surgical intervention at this time, further surgical planning/consideration pending continued work up.    -Please obtain 3 phase CT abdomen or MRI  -Further surgical planning pending further work up  -Team 1C will continue to follow, please reach out with any questions/concerns       
79F PMHx unresectable intrahepatic cholangiocarcinoma on gem/cis/durvalumab s/p Y90 embolization 05 and 06/24, HTN, HLD presenting with several weeks of fever, failure to thrive and abdominal pain.    # Unresectable intrahepatic cholangiocarcinoma  s/p 5 cycles of gem/cis/durvalumab -- plan maintenance durvalumab following C6 if cleared from infectious standpoint     # Fever >2wks in an immunocompromised patient refractory to augmentin  -- concerning for liver abscess v line infection v other infection  -- please obtain RUQ ultrasound to evaluate for a possible abscess  -- please consult ID     Patient seen and d/w hematology/oncology attending, Dr Bernabe No
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 On 10/28, several days after a chemo session, she began to have episodic fevers and chills. She presented to the ED on 11/1 for these fever and was discharged w/o admission with augmentin 7d, of which she completed 5d, stopping d/t GI side effects. Workup in ED included: BCx NGTD, CTAP showing stable cholangiocarcinoma. RUQ US showing findings similar to prior studies. Since admission, VSS, no fevers or WBC counts, no HIV, mono.    - Please obtain blood cx from chemoport today  - Please obtain Gadolinium enhanced MRI to further characterize the nature of the liver lesions and to rule out liver abscess or biloma as a source of fever  - Monitor off antibiotics for now, if imaging findings and blood Cx are neative, the fever might be related to cancer cell necrosis.     Team 1 will continue to follow  Case discussed with Dr Davidson

## 2024-11-14 NOTE — DIETITIAN INITIAL EVALUATION ADULT - PERTINENT MEDS FT
MEDICATIONS  (STANDING):  amLODIPine   Tablet 2.5 milliGRAM(s) Oral daily  atenolol  Tablet 25 milliGRAM(s) Oral daily  atorvastatin 10 milliGRAM(s) Oral at bedtime  enoxaparin Injectable 40 milliGRAM(s) SubCutaneous every 24 hours  lisinopril 10 milliGRAM(s) Oral daily  montelukast 10 milliGRAM(s) Oral daily  pantoprazole    Tablet 40 milliGRAM(s) Oral before breakfast    MEDICATIONS  (PRN):  ibuprofen  Tablet. 200 milliGRAM(s) Oral every 8 hours PRN Temp greater or equal to 38C (100.4F), Mild Pain (1 - 3)  LORazepam     Tablet 0.5 milliGRAM(s) Oral once PRN Before MRI  polyethylene glycol 3350 17 Gram(s) Oral two times a day PRN Constipation  senna 2 Tablet(s) Oral at bedtime PRN Constipation

## 2024-11-14 NOTE — PROGRESS NOTE ADULT - ASSESSMENT
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 79F PMHx 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 Arvizu), vestibular neuritis, remote ovarian cancer, HTN, HLD presenting with several weeks of fever of unknown origin and abdominal pain.

## 2024-11-14 NOTE — DIETITIAN INITIAL EVALUATION ADULT - PERTINENT LABORATORY DATA
11-14    130[L]  |  98  |  19  ----------------------------<  123[H]  4.5   |  21[L]  |  0.76    Ca    8.7      14 Nov 2024 08:13  Phos  3.0     11-14  Mg     1.8     11-14    TPro  6.3  /  Alb  2.8[L]  /  TBili  0.2  /  DBili  x   /  AST  49[H]  /  ALT  42  /  AlkPhos  245[H]  11-14

## 2024-11-14 NOTE — PROGRESS NOTE ADULT - PROBLEM SELECTOR PLAN 6
Home meds: atorvastatin 10mg qd    Plan  - c/w home atorvastatin 10mg qd on home amlodipine benzapril 2.5-10mg qD, atenolol 25mg qD.     Plan  - c/w atenolol 25mg qd  - c/w lisinopril 10mg for benzapril TI  - c/w amlodipine 2.5mg qD

## 2024-11-14 NOTE — PROGRESS NOTE ADULT - PROBLEM SELECTOR PLAN 3
on home montelukast 10mg QD, albuterol prn    Plan  - c/w home meds Pt reports a hx of vestibular neuritis for which she has been undergoing PT. States it causes recurrent HA, dizziness, blurry vision. Usually takes ibuprofen for symptomatic relief.    Plan  - ibuprofen 200mg q8hr prn

## 2024-11-14 NOTE — PROGRESS NOTE ADULT - ASSESSMENT
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 On 10/28, several days after a chemo session, she began to have episodic fevers and chills. She presented to the ED on 11/1 for these fever and was discharged w/o admission with augmentin 7d, of which she completed 5d, stopping d/t GI side effects. Workup in ED included: BCx NGTD, CTAP showing stable cholangiocarcinoma. RUQ US showing findings similar to prior studies. Since admission, VSS, no fevers or WBC counts, no HIV, mono.    - Please obtain blood cx from chemoport today  - Please obtain Gadolinium enhanced MRI to further characterize the nature of the liver lesions and to rule out liver abscess or biloma as a source of fever  - Monitor off antibiotics for now, if imaging findings and blood Cx are neative, the fever might be related to cancer cell necrosis.     Team 1 will continue to follow  Case discussed with Dr Dvaidson  79F PMHx unresectable cholangiocarcinoma on chemo s/p embolization (follows Nolberto), vestibular neuritis, remote ovarian cancer, HTN, HLD presenting with several weeks of fever of unknown origin and abdominal pain. On 10/28, several days after a chemo session, she began to have episodic fevers and chills. She presented to the ED on 11/1 for these fever and was discharged w/o admission with augmentin 7d, of which she completed 5d, stopping d/t GI side effects. Workup in ED included: BCx NGTD, CTAP showing stable cholangiocarcinoma. RUQ US showing findings similar to prior studies. Since admission, VSS, no fevers or WBC counts, no HIV, mono.    - FU blood cx from chemoport from yesterday  - Please obtain Gadolinium enhanced MRI to further characterize the nature of the liver lesions and to rule out liver abscess or biloma as a source of fever  - Monitor off antibiotics for now, if imaging findings and blood Cx are neative, the fever might be related to cancer cell necrosis.   - If she spikes >101 fever, or leukocytosis continues to worsen, or feels unwell, or any clinical status change, then start ceftriaxone 1g IV q24h and flagyl 500mg PO q12h.     Team 1 will continue to follow  Case discussed with Dr Davidson

## 2024-11-14 NOTE — DIETITIAN INITIAL EVALUATION ADULT - OTHER CALCULATIONS
Estimated needs based on dosing wt as within % IBW 145lb/65.9kg (108%). Needs adjusted for age and cancer on treatment.

## 2024-11-15 LAB
ALBUMIN SERPL ELPH-MCNC: 3 G/DL — LOW (ref 3.3–5)
ALP SERPL-CCNC: 246 U/L — HIGH (ref 40–120)
ALT FLD-CCNC: 42 U/L — SIGNIFICANT CHANGE UP (ref 10–45)
ANION GAP SERPL CALC-SCNC: 6 MMOL/L — SIGNIFICANT CHANGE UP (ref 5–17)
AST SERPL-CCNC: 41 U/L — HIGH (ref 10–40)
BASOPHILS # BLD AUTO: 0.08 K/UL — SIGNIFICANT CHANGE UP (ref 0–0.2)
BASOPHILS NFR BLD AUTO: 0.8 % — SIGNIFICANT CHANGE UP (ref 0–2)
BILIRUB SERPL-MCNC: 0.2 MG/DL — SIGNIFICANT CHANGE UP (ref 0.2–1.2)
BUN SERPL-MCNC: 19 MG/DL — SIGNIFICANT CHANGE UP (ref 7–23)
CALCIUM SERPL-MCNC: 9.1 MG/DL — SIGNIFICANT CHANGE UP (ref 8.4–10.5)
CHLORIDE SERPL-SCNC: 101 MMOL/L — SIGNIFICANT CHANGE UP (ref 96–108)
CO2 SERPL-SCNC: 25 MMOL/L — SIGNIFICANT CHANGE UP (ref 22–31)
CREAT SERPL-MCNC: 0.78 MG/DL — SIGNIFICANT CHANGE UP (ref 0.5–1.3)
EGFR: 78 ML/MIN/1.73M2 — SIGNIFICANT CHANGE UP
EOSINOPHIL # BLD AUTO: 0.03 K/UL — SIGNIFICANT CHANGE UP (ref 0–0.5)
EOSINOPHIL NFR BLD AUTO: 0.3 % — SIGNIFICANT CHANGE UP (ref 0–6)
GLUCOSE SERPL-MCNC: 110 MG/DL — HIGH (ref 70–99)
HCT VFR BLD CALC: 27.3 % — LOW (ref 34.5–45)
HGB BLD-MCNC: 8.5 G/DL — LOW (ref 11.5–15.5)
IMM GRANULOCYTES NFR BLD AUTO: 6.6 % — HIGH (ref 0–0.9)
LYMPHOCYTES # BLD AUTO: 1 K/UL — SIGNIFICANT CHANGE UP (ref 1–3.3)
LYMPHOCYTES # BLD AUTO: 9.7 % — LOW (ref 13–44)
MAGNESIUM SERPL-MCNC: 1.9 MG/DL — SIGNIFICANT CHANGE UP (ref 1.6–2.6)
MCHC RBC-ENTMCNC: 31.1 G/DL — LOW (ref 32–36)
MCHC RBC-ENTMCNC: 31.7 PG — SIGNIFICANT CHANGE UP (ref 27–34)
MCV RBC AUTO: 101.9 FL — HIGH (ref 80–100)
MONOCYTES # BLD AUTO: 1.09 K/UL — HIGH (ref 0–0.9)
MONOCYTES NFR BLD AUTO: 10.6 % — SIGNIFICANT CHANGE UP (ref 2–14)
NEUTROPHILS # BLD AUTO: 7.38 K/UL — SIGNIFICANT CHANGE UP (ref 1.8–7.4)
NEUTROPHILS NFR BLD AUTO: 72 % — SIGNIFICANT CHANGE UP (ref 43–77)
NRBC # BLD: 0 /100 WBCS — SIGNIFICANT CHANGE UP (ref 0–0)
PHOSPHATE SERPL-MCNC: 3.6 MG/DL — SIGNIFICANT CHANGE UP (ref 2.5–4.5)
PLATELET # BLD AUTO: 418 K/UL — HIGH (ref 150–400)
POTASSIUM SERPL-MCNC: 4.6 MMOL/L — SIGNIFICANT CHANGE UP (ref 3.5–5.3)
POTASSIUM SERPL-SCNC: 4.6 MMOL/L — SIGNIFICANT CHANGE UP (ref 3.5–5.3)
PROT SERPL-MCNC: 6.7 G/DL — SIGNIFICANT CHANGE UP (ref 6–8.3)
RBC # BLD: 2.68 M/UL — LOW (ref 3.8–5.2)
RBC # FLD: 15.8 % — HIGH (ref 10.3–14.5)
SODIUM SERPL-SCNC: 132 MMOL/L — LOW (ref 135–145)
WBC # BLD: 10.26 K/UL — SIGNIFICANT CHANGE UP (ref 3.8–10.5)
WBC # FLD AUTO: 10.26 K/UL — SIGNIFICANT CHANGE UP (ref 3.8–10.5)

## 2024-11-15 PROCEDURE — 99232 SBSQ HOSP IP/OBS MODERATE 35: CPT

## 2024-11-15 PROCEDURE — 99233 SBSQ HOSP IP/OBS HIGH 50: CPT

## 2024-11-15 PROCEDURE — 99232 SBSQ HOSP IP/OBS MODERATE 35: CPT | Mod: GC

## 2024-11-15 RX ORDER — PIPERACILLIN SODIUM AND TAZOBACTAM SODIUM 4; .5 G/20ML; G/20ML
4.5 INJECTION, POWDER, LYOPHILIZED, FOR SOLUTION INTRAVENOUS ONCE
Refills: 0 | Status: COMPLETED | OUTPATIENT
Start: 2024-11-16 | End: 2024-11-16

## 2024-11-15 RX ORDER — PIPERACILLIN SODIUM AND TAZOBACTAM SODIUM 4; .5 G/20ML; G/20ML
4.5 INJECTION, POWDER, LYOPHILIZED, FOR SOLUTION INTRAVENOUS ONCE
Refills: 0 | Status: COMPLETED | OUTPATIENT
Start: 2024-11-15 | End: 2024-11-15

## 2024-11-15 RX ORDER — PIPERACILLIN SODIUM AND TAZOBACTAM SODIUM 4; .5 G/20ML; G/20ML
4.5 INJECTION, POWDER, LYOPHILIZED, FOR SOLUTION INTRAVENOUS EVERY 8 HOURS
Refills: 0 | Status: DISCONTINUED | OUTPATIENT
Start: 2024-11-16 | End: 2024-11-16

## 2024-11-15 RX ADMIN — ATENOLOL 25 MILLIGRAM(S): 100 TABLET ORAL at 06:37

## 2024-11-15 RX ADMIN — MONTELUKAST SODIUM 10 MILLIGRAM(S): 10 TABLET ORAL at 21:55

## 2024-11-15 RX ADMIN — PANTOPRAZOLE SODIUM 40 MILLIGRAM(S): 40 TABLET, DELAYED RELEASE ORAL at 06:37

## 2024-11-15 RX ADMIN — LISINOPRIL 10 MILLIGRAM(S): 20 TABLET ORAL at 21:55

## 2024-11-15 RX ADMIN — Medication 10 MILLIGRAM(S): at 21:55

## 2024-11-15 RX ADMIN — Medication 200 MILLIGRAM(S): at 10:45

## 2024-11-15 RX ADMIN — Medication 200 MILLIGRAM(S): at 12:57

## 2024-11-15 RX ADMIN — AMLODIPINE BESYLATE 2.5 MILLIGRAM(S): 10 TABLET ORAL at 21:55

## 2024-11-15 NOTE — PROGRESS NOTE ADULT - SUBJECTIVE AND OBJECTIVE BOX
INCOMPLETE NOTE - NOTE IN PROGRESS**  Infectious Disease Progress Note    Interval Events:    Subjective:  Patient seen and evaluated at bedside.    ROS:  negative except as above      ANTIBIOTICS/RELEVANT:  antimicrobials    immunologic:    OTHER:  amLODIPine   Tablet 2.5 milliGRAM(s) Oral daily  atenolol  Tablet 25 milliGRAM(s) Oral daily  atorvastatin 10 milliGRAM(s) Oral at bedtime  enoxaparin Injectable 40 milliGRAM(s) SubCutaneous every 24 hours  ibuprofen  Tablet. 200 milliGRAM(s) Oral every 8 hours PRN  lisinopril 10 milliGRAM(s) Oral daily  montelukast 10 milliGRAM(s) Oral daily  pantoprazole    Tablet 40 milliGRAM(s) Oral before breakfast  polyethylene glycol 3350 17 Gram(s) Oral two times a day PRN  senna 2 Tablet(s) Oral at bedtime PRN      Allergies    narcotic analgesics (Other)    Intolerances        Objective:  Vital Signs Last 24 Hrs  T(C): 36.9 (15 Nov 2024 06:04), Max: 37.1 (15 Nov 2024 02:00)  T(F): 98.4 (15 Nov 2024 06:04), Max: 98.7 (15 Nov 2024 02:00)  HR: 84 (15 Nov 2024 07:20) (82 - 103)  BP: 122/88 (15 Nov 2024 06:04) (103/61 - 122/88)  BP(mean): 100 (15 Nov 2024 06:04) (100 - 100)  RR: 16 (15 Nov 2024 07:20) (16 - 18)  SpO2: 98% (15 Nov 2024 07:20) (98% - 100%)    Parameters below as of 15 Nov 2024 07:20  Patient On (Oxygen Delivery Method): room air        PHYSICAL EXAM: **NOTE IN PROGRESS**  General: NAD  HEENT: NC/AT; PERRL, clear conjunctiva  Neck: supple  Respiratory: CTA b/l  Cardiovascular: +S1/S2; RRR  Abdomen: soft, NT/ND; +BS x4  Extremities: WWP, 2+ peripheral pulses b/l; no LE edema  Skin: normal color and turgor; no rash  Neurological:       LABS:                        8.5    10.26 )-----------( 418      ( 15 Nov 2024 05:30 )             27.3     11-14    130[L]  |  98  |  19  ----------------------------<  123[H]  4.5   |  21[L]  |  0.76    Ca    8.7      14 Nov 2024 08:13  Phos  3.6     11-15  Mg     1.9     11-15    TPro  6.3  /  Alb  2.8[L]  /  TBili  0.2  /  DBili  x   /  AST  49[H]  /  ALT  42  /  AlkPhos  245[H]  11-14      Urinalysis Basic - ( 14 Nov 2024 08:13 )    Color: x / Appearance: x / SG: x / pH: x  Gluc: 123 mg/dL / Ketone: x  / Bili: x / Urobili: x   Blood: x / Protein: x / Nitrite: x   Leuk Esterase: x / RBC: x / WBC x   Sq Epi: x / Non Sq Epi: x / Bacteria: x        MICROBIOLOGY:            RECENT CULTURES:  11-13 @ 15:39  .Blood BLOOD  --  --  --    No growth at 24 hours  --  11-12 @ 10:12  .Blood BLOOD  --  --  --    No growth at 48 Hours  --      RADIOLOGY & ADDITIONAL STUDIES:   Infectious Disease Progress Note    Interval Events: no events, no fevers     Subjective:  Patient seen and evaluated at bedside.    ROS:  negative except as above      ANTIBIOTICS/RELEVANT:  antimicrobials    immunologic:    OTHER:  amLODIPine   Tablet 2.5 milliGRAM(s) Oral daily  atenolol  Tablet 25 milliGRAM(s) Oral daily  atorvastatin 10 milliGRAM(s) Oral at bedtime  enoxaparin Injectable 40 milliGRAM(s) SubCutaneous every 24 hours  ibuprofen  Tablet. 200 milliGRAM(s) Oral every 8 hours PRN  lisinopril 10 milliGRAM(s) Oral daily  montelukast 10 milliGRAM(s) Oral daily  pantoprazole    Tablet 40 milliGRAM(s) Oral before breakfast  polyethylene glycol 3350 17 Gram(s) Oral two times a day PRN  senna 2 Tablet(s) Oral at bedtime PRN      Allergies    narcotic analgesics (Other)    Intolerances        Objective:  Vital Signs Last 24 Hrs  T(C): 36.9 (15 Nov 2024 06:04), Max: 37.1 (15 Nov 2024 02:00)  T(F): 98.4 (15 Nov 2024 06:04), Max: 98.7 (15 Nov 2024 02:00)  HR: 84 (15 Nov 2024 07:20) (82 - 103)  BP: 122/88 (15 Nov 2024 06:04) (103/61 - 122/88)  BP(mean): 100 (15 Nov 2024 06:04) (100 - 100)  RR: 16 (15 Nov 2024 07:20) (16 - 18)  SpO2: 98% (15 Nov 2024 07:20) (98% - 100%)    Parameters below as of 15 Nov 2024 07:20  Patient On (Oxygen Delivery Method): room air        PHYSICAL EXAM:   Constitutional: alert, NAD  Eyes: the sclera and conjunctiva were normal.   ENT: the ears and nose were normal in appearance.   Neck: the appearance of the neck was normal and the neck was supple.   Pulmonary: no respiratory distress and lungs were clear to auscultation bilaterally.   Heart: heart rate was normal and rhythm regular, normal S1 and S2  Vascular:. there was no peripheral edema  Abdomen: normal bowel sounds, soft, non-tender  Neurological: no focal deficits.   Psychiatric: the affect was normal      LABS:                        8.5    10.26 )-----------( 418      ( 15 Nov 2024 05:30 )             27.3     11-14    130[L]  |  98  |  19  ----------------------------<  123[H]  4.5   |  21[L]  |  0.76    Ca    8.7      14 Nov 2024 08:13  Phos  3.6     11-15  Mg     1.9     11-15    TPro  6.3  /  Alb  2.8[L]  /  TBili  0.2  /  DBili  x   /  AST  49[H]  /  ALT  42  /  AlkPhos  245[H]  11-14      Urinalysis Basic - ( 14 Nov 2024 08:13 )    Color: x / Appearance: x / SG: x / pH: x  Gluc: 123 mg/dL / Ketone: x  / Bili: x / Urobili: x   Blood: x / Protein: x / Nitrite: x   Leuk Esterase: x / RBC: x / WBC x   Sq Epi: x / Non Sq Epi: x / Bacteria: x        MICROBIOLOGY:            RECENT CULTURES:  11-13 @ 15:39  .Blood BLOOD  --  --  --    No growth at 24 hours  --  11-12 @ 10:12  .Blood BLOOD  --  --  --    No growth at 48 Hours  --      RADIOLOGY & ADDITIONAL STUDIES:

## 2024-11-15 NOTE — DISCHARGE NOTE PROVIDER - NSDCFUSCHEDAPPT_GEN_ALL_CORE_FT
Moreno Loredo  [facility]  SURGONC [address]  Scheduled Appointment: 11/19/2024    Freida Moffett  [facility]  HEMONC [address]  Scheduled Appointment: 11/21/2024    [facility]  AMBCHEMO  [address]  Scheduled Appointment: 12/05/2024    [facility]  AMBCHEMO  [address]  Scheduled Appointment: 12/12/2024     Freida Moffett  [facility]  HEMONC [address]  Scheduled Appointment: 11/21/2024    [facility]  AMBCHEMO  [address]  Scheduled Appointment: 12/05/2024    [facility]  AMBCHEMO  [address]  Scheduled Appointment: 12/12/2024

## 2024-11-15 NOTE — PROGRESS NOTE ADULT - PROBLEM SELECTOR PLAN 2
Active malignancy, following Dr. Arvizu, seen most recently several days ago, aware of ongoing fevers. Pt was due for 6 cycles of gemcitabine/cisplatin/durvalumab, however only completed 4 out of 6.  S/p embolization in May with IR and on active chemo, last chem was end of October next dose planned for later this month pending fever w/u.  Per pt, she is also to follow up with a surgeon Dr. Loredo regarding possible surgical resection.    Plan  - f/u heme/onc recs  - nutrition consult pending for anorexia and mild nausea  - pt requesting SW to help set up home care iso inability to complete ADLs from malignancy Active malignancy, following Dr. Arvizu, seen most recently several days ago, aware of ongoing fevers. Pt was due for 6 cycles of gemcitabine/cisplatin/durvalumab, however only completed 4 out of 6.  S/p embolization in May with IR and on active chemo, last chem was end of October next dose planned for later this month pending fever w/u.  Per pt, she is also to follow up with a surgeon Dr. Loredo regarding possible surgical resection.    Plan  - f/u heme/onc recs

## 2024-11-15 NOTE — PROGRESS NOTE ADULT - TIME BILLING
Discussion with patient, review of records, ordering of studies and interpertation.
fever, cholangiocarcinoma.
fever, cholangiocarcinoma.

## 2024-11-15 NOTE — PROGRESS NOTE ADULT - PROBLEM SELECTOR PLAN 6
on home amlodipine benzapril 2.5-10mg qD, atenolol 25mg qD.     Plan  - c/w atenolol 25mg qd  - c/w lisinopril 10mg for benzapril TI  - c/w amlodipine 2.5mg qD

## 2024-11-15 NOTE — DISCHARGE NOTE PROVIDER - CARE PROVIDERS DIRECT ADDRESSES
[email or web address]

## 2024-11-15 NOTE — PROGRESS NOTE ADULT - ASSESSMENT
79F PMHx unresectable intrahepatic cholangiocarcinoma on gem/cis/durvalumab s/p Y90 embolization 05 and 06/24, HTN, HLD presenting with several weeks of fever, failure to thrive and abdominal pain.    # Unresectable intrahepatic cholangiocarcinoma  s/p 5 cycles of gem/cis/durvalumab -- plan maintenance durvalumab following C6 if cleared from infectious standpoint     # Fever >2wks in an immunocompromised patient refractory to augmentin (last febrile 11/14)  -- port cultures negative so far  -- MRCP 11/14 w/ new tumor hypoenhancement --> please start patient on zosyn  -- ID following     Patient seen and d/w hematology/oncology attending, Dr Bernabe No

## 2024-11-15 NOTE — PROGRESS NOTE ADULT - ASSESSMENT
79F recent dx of unresectable intrahepatic cholangiocarcinoma (dx April 2024), s/p y90 embolization (05 and 06/24) x2, s/p 5 cycles of gem/cis/durvalumab, admitted to medicine service for fevers of unknown origin, infectious workup negative thus far. Heme-onc and infectious disease following, plan to evaluate patient off antibiotics, considering tumor cell necrosis as possible source of fevers. No acute surgical intervention at this time, further surgical planning/consideration pending continued work up.    Recs:              Team 1C will continue to follow, please reach out with any questions/concerns     Case discussed with chief resident and attending physician. 79F recent dx of unresectable intrahepatic cholangiocarcinoma (dx April 2024), s/p y90 embolization (05 and 06/24) x2, s/p 5 cycles of gem/cis/durvalumab, admitted to medicine service for fevers of unknown origin, infectious workup negative thus far. Heme-onc and infectious disease following, plan to evaluate patient off antibiotics, considering tumor cell necrosis as possible source of fevers. No acute surgical intervention at this time, further surgical planning/consideration pending continued work up.    Recs:  - MRI reviewed, no identifiable source of fevers noted  - no acute surgical intervention at this time  - agree with trial off abx      Team 1C will continue to follow, please reach out with any questions/concerns     Case discussed with chief resident and attending physician.

## 2024-11-15 NOTE — DISCHARGE NOTE PROVIDER - CARE PROVIDER_API CALL
Katya Arvizu [place]  Hematology  [address]  [place] [address]  Phone: [phone]  Fax: [phone]  Established Patient  Follow Up Time:     Moreno Loredo  Surgical Oncology  [address] [address]  Phone: [phone]  Fax: [phone]  Established Patient  Follow Up Time:

## 2024-11-15 NOTE — PROGRESS NOTE ADULT - PROBLEM SELECTOR PLAN 3
Pt reports a hx of vestibular neuritis for which she has been undergoing PT. States it causes recurrent HA, dizziness, blurry vision. Usually takes ibuprofen for symptomatic relief.    Plan  - ibuprofen 200mg q8hr prn

## 2024-11-15 NOTE — PROGRESS NOTE ADULT - PROBLEM SELECTOR PLAN 1
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 initial ED w/u on 11/1 negative for bacteremia, UTI, progression of malignancy per imaging. given fever + abd pain, ddx liver abscess, sbp, cholangitis, possibly central line infection of chemo port. Possibly tumor/chemo fevers. Fevers refractory to 5d of augmentin.   CXR unremarkable. UA largely unremarkable, ucx growing normal urogenital pedrito. Urine strep neg. COVID, flu, RSV neg. Full RVP neg. Hepatitis neg. HIV neg.  RUQ/abdominal u/s 11/13: Since November 1, 2024, no significant change since the CT exam. Left hepatic vein again not visualized. Left portal vein branch not definitely visualized. Unchanged intrahepatic ductal dilatation and hepatic masses consistent with known cholangiocarcinoma.  Pt with fever to 100.6F 11/14 with subjective chills.    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 initial ED w/u on 11/1 negative for bacteremia, UTI, progression of malignancy per imaging. given fever + abd pain, ddx liver abscess, sbp, cholangitis, possibly central line infection of chemo port. Possibly tumor/chemo fevers. Fevers refractory to 5d of augmentin.   CXR unremarkable. UA largely unremarkable, ucx growing normal urogenital pedrito. Urine strep neg. COVID, flu, RSV neg. Full RVP neg. Hepatitis neg. HIV neg.  RUQ/abdominal u/s 11/13: Since November 1, 2024, no significant change since the CT exam. Left hepatic vein again not visualized. Left portal vein branch not definitely visualized. Unchanged intrahepatic ductal dilatation and hepatic masses consistent with known cholangiocarcinoma.  Pt with fever to 100.6F 11/14 with subjective chills.    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 initial ED w/u on 11/1 negative for bacteremia, UTI, progression of malignancy per imaging. given fever + abd pain, ddx liver abscess, sbp, cholangitis, possibly central line infection of chemo port. Possibly tumor/chemo fevers. Fevers refractory to 5d of augmentin.   CXR unremarkable. UA largely unremarkable, ucx growing normal urogenital pedrito. Urine strep neg. COVID, flu, RSV neg. Full RVP neg. Hepatitis neg. HIV neg.  RUQ/abdominal u/s 11/13: Since November 1, 2024, no significant change since the CT exam. Left hepatic vein again not visualized. Left portal vein branch not definitely visualized. Unchanged intrahepatic ductal dilatation and hepatic masses consistent with known cholangiocarcinoma.  11/14: Pt with fever to 100.6F with subjective chills.  11/15: No fevers or focal symptoms while off abx.    Plan  - monitor off ABX; per ID, if pt starts to have fever > 101F, hemodynamic instability, or leukocytosis, low threshold to start ceftriaxone 1g IV q24h and flagyl 500mg PO q12h  - f/u further surgery recs  - f/u further heme/onc recs   - f/u further ID recs  - f/u blood cultures drawn in ED - NGTD   - f/u blood cultures from chemoport - NGTD  - f/u urine legionella

## 2024-11-15 NOTE — DISCHARGE NOTE PROVIDER - NSDCCPTREATMENT_GEN_ALL_CORE_FT
PRINCIPAL PROCEDURE  Procedure: MRI of abdomen  Findings and Treatment: MRI of the abdomen was performed. MRCP was performed.  FINDINGS:  LOWER CHEST: Within normal limits.  LIVER: No significant change in intrahepatic mass measuring 10.8 x 6.0   cm. New from prior MRI is increased T2 signal and lack of enhancement   throughout the entire left hepatic lobe, again likely reflecting   posttreatment changes.  BILE DUCTS: Stable intrahepatic ductal dilatation.  GALLBLADDER: Within normal limits.  SPLEEN: Within normal limits.  PANCREAS: Within normal limits.  ADRENALS: Within normal limits.  KIDNEYS/URETERS: Bilateral renal cysts. No hydronephrosis.  VISUALIZED PORTIONS:  BOWEL: Within normal limits.  PERITONEUM: No ascites.  VESSELS: Within normal limits.  RETROPERITONEUM/LYMPH NODES: Stable nonspecific right retrocrural nodes.  ABDOMINAL WALL: Within normal limits.  BONES: Within normal limits.  IMPRESSION:  No significant change in intrahepatic cholangiocarcinoma. New   hypoenhancement throughout the left hepatic lobe likely represents   posttreatment change.  Persistent intrahepatic biliary ductal dilatation secondary to the   intrahepatic cholangiocarcinoma

## 2024-11-15 NOTE — PROGRESS NOTE ADULT - ASSESSMENT
79F PMHx 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 Arvizu), vestibular neuritis, remote ovarian cancer, HTN, HLD presenting with several weeks of fever of unknown origin and abdominal pain. 79F PMHx 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 Arvizu), vestibular neuritis, remote ovarian cancer, HTN, HLD presenting with several weeks of fever of unknown origin and abdominal pain. Fevers thought to be likely necrosis of cholangiocarcinoma 2/2 chemotherapy.

## 2024-11-15 NOTE — DISCHARGE NOTE PROVIDER - PROVIDER TOKENS
PROVIDER:[TOKEN:[[id number]],ESTABLISHEDPATIENT:[T]],PROVIDER:[TOKEN:[[id number]],ESTABLISHEDPATIENT:[T]]

## 2024-11-15 NOTE — PROGRESS NOTE ADULT - SUBJECTIVE AND OBJECTIVE BOX
SUBJECTIVE: Pt seen and examined at bedside this am by surgery team. Reports some abdominal bloating. Denies N/V, CP, SOB or abd pain. Tolerating diet, sticking to high protein foods. Last passed bowel movement yesterday (formed but not hard, brown). Passing flatus consistently.     MEDICATIONS  (STANDING):  amLODIPine   Tablet 2.5 milliGRAM(s) Oral daily  atenolol  Tablet 25 milliGRAM(s) Oral daily  atorvastatin 10 milliGRAM(s) Oral at bedtime  enoxaparin Injectable 40 milliGRAM(s) SubCutaneous every 24 hours  lisinopril 10 milliGRAM(s) Oral daily  montelukast 10 milliGRAM(s) Oral daily  pantoprazole    Tablet 40 milliGRAM(s) Oral before breakfast    MEDICATIONS  (PRN):  ibuprofen  Tablet. 200 milliGRAM(s) Oral every 8 hours PRN Temp greater or equal to 38C (100.4F), Mild Pain (1 - 3)  polyethylene glycol 3350 17 Gram(s) Oral two times a day PRN Constipation  senna 2 Tablet(s) Oral at bedtime PRN Constipation      Vital Signs Last 24 Hrs  T(C): 36.9 (15 Nov 2024 06:04), Max: 37.1 (15 Nov 2024 02:00)  T(F): 98.4 (15 Nov 2024 06:04), Max: 98.7 (15 Nov 2024 02:00)  HR: 84 (15 Nov 2024 07:20) (82 - 103)  BP: 122/88 (15 Nov 2024 06:04) (103/61 - 122/88)  BP(mean): 100 (15 Nov 2024 06:04) (100 - 100)  RR: 16 (15 Nov 2024 07:20) (16 - 18)  SpO2: 98% (15 Nov 2024 07:20) (98% - 100%)    Parameters below as of 15 Nov 2024 07:20  Patient On (Oxygen Delivery Method): room air        PHYSICAL EXAM:    Constitutional: A&Ox3, nad  Respiratory: non labored breathing, no respiratory distress  Cardiovascular: NSR, RRR  Gastrointestinal: abd soft, NT, ND  Genitourinary: voiding  Extremities: (-) edema, wwp, SCDs in place                  I&O's Detail      LABS:                        7.4    12.39 )-----------( 415      ( 14 Nov 2024 08:13 )             23.9     11-14    130[L]  |  98  |  19  ----------------------------<  123[H]  4.5   |  21[L]  |  0.76    Ca    8.7      14 Nov 2024 08:13  Phos  3.0     11-14  Mg     1.8     11-14    TPro  6.3  /  Alb  2.8[L]  /  TBili  0.2  /  DBili  x   /  AST  49[H]  /  ALT  42  /  AlkPhos  245[H]  11-14      Urinalysis Basic - ( 14 Nov 2024 08:13 )    Color: x / Appearance: x / SG: x / pH: x  Gluc: 123 mg/dL / Ketone: x  / Bili: x / Urobili: x   Blood: x / Protein: x / Nitrite: x   Leuk Esterase: x / RBC: x / WBC x   Sq Epi: x / Non Sq Epi: x / Bacteria: x        RADIOLOGY & ADDITIONAL STUDIES:

## 2024-11-15 NOTE — PROGRESS NOTE ADULT - ASSESSMENT
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 On 10/28, several days after a chemo session, she began to have episodic fevers and chills. She presented to the ED on 11/1 for these fever and was discharged w/o admission with augmentin 7d, of which she completed 5d, stopping d/t GI side effects. Workup in ED included: BCx NGTD, CTAP showing stable cholangiocarcinoma. RUQ US showing findings similar to prior studies. Since admission, VSS, no fevers or WBC counts, no HIV, mono.    -  blood cx negative from chemoport from yesterday  -  MRI with no signs of abscess or infection  - Monitor off antibiotics the fever might be related to cancer cell necrosis.   - Team 1 will sign off      Please consult for any further questions arises   Case discussed with Dr Davidson

## 2024-11-15 NOTE — DISCHARGE NOTE PROVIDER - NSDCCPCAREPLAN_GEN_ALL_CORE_FT
PRINCIPAL DISCHARGE DIAGNOSIS  Diagnosis: Fever of unknown origin  Assessment and Plan of Treatment: You were admitted to the hospital for fever of unknown origin.     PRINCIPAL DISCHARGE DIAGNOSIS  Diagnosis: Fever of unknown origin  Assessment and Plan of Treatment: You were admitted to the hospital for fever of unknown origin. While in the hospital, you underwent numerous imaging tests, including ultrasound and MRI, to try and find a source of your infection. These tests did not show any evidence of intraabdominal infection. However, you were started on IV antibiotics called zosyn to cover for any possible infection, per the oncology team. We would like you to continue with antibiotics at home. Please continue to take cefpodoxime 200mg 2x/day and flagyl 500mg 3x/day for the next two days (last day is November 20th). If you have any worsening symptoms, such as persistent fever over 100.4F, please return to the ED.      SECONDARY DISCHARGE DIAGNOSES  Diagnosis: Cholangiocarcinoma  Assessment and Plan of Treatment: You have a known history of cholangiocarcinoma. While in the hospital, you had imaging done which showed some post-treatment changes. Please be sure to follow up with your oncologist, Dr. Arvizu, as well as your surgeon, Dr. Loredo, to go over your treatment plan moving forward.     PRINCIPAL DISCHARGE DIAGNOSIS  Diagnosis: Fever of unknown origin  Assessment and Plan of Treatment: You were admitted to the hospital for fever of unknown origin. While in the hospital, you underwent numerous imaging tests, including ultrasound and MRI, to try and find a source of your infection. These tests did not show any evidence of intraabdominal infection. However, you were started on IV antibiotics called zosyn to cover for any possible infection, per the oncology team. We would like you to continue with antibiotics at home. Please continue to take cefpodoxime 200mg 2x/day and flagyl 500mg 3x/day for the next two days (last day is November 20th). If you have any worsening symptoms, such as persistent fever over 100.4F, please return to the ED.      SECONDARY DISCHARGE DIAGNOSES  Diagnosis: Cholangiocarcinoma  Assessment and Plan of Treatment: You have a known history of cholangiocarcinoma. While in the hospital, you had imaging done which showed some post-treatment changes. Please be sure to follow up with your oncologist, Dr. Arvizu, as well as your surgeon, Dr. Loredo, to go over your treatment plan moving forward. You should also complete physical therapy outpatient to help you build up your strength. We also think it would be beneficial for you to undergo psychotherapy in the [facility] program. You can contact Dr. Nikki Villa, PhD.

## 2024-11-15 NOTE — PROGRESS NOTE ADULT - SUBJECTIVE AND OBJECTIVE BOX
WALLACE, reports feeling "crummy"      PHYSICAL EXAM:  General: in no acute distress  HENT: Moist mucous membranes  Neck: Trachea midline, supple  Lungs: CTA B/L. No wheezes, rales, or rhonchi  Cardiovascular: RRR. No murmurs, rubs, or gallops  Abdomen: Soft, non-tender non-distended; No rebound or guarding  Extremities: WWP, No clubbing, cyanosis or edema  Neurological: Alert and oriented  Skin: Warm and dry. No obvious rash       VITAL SIGNS:  Vital Signs Last 24 Hrs  T(C): 36.9 (15 Nov 2024 06:04), Max: 37.1 (15 Nov 2024 02:00)  T(F): 98.4 (15 Nov 2024 06:04), Max: 98.7 (15 Nov 2024 02:00)  - rechecked during morning rounds, 98.1 F  HR: 103 (15 Nov 2024 06:04) (82 - 103) - rechecked during morning rounds, 85 beats per minute  BP: 122/88 (15 Nov 2024 06:04) (103/61 - 122/88)  - rechecked during morning rounds, 129/77 mmHg  BP(mean): 100 (15 Nov 2024 06:04) (100 - 100)  RR: 16 (15 Nov 2024 06:04) (16 - 18)  SpO2: 98% (15 Nov 2024 06:04) (98% - 100%)    Parameters below as of 15 Nov 2024 06:04  Patient On (Oxygen Delivery Method): room air    MEDICATIONS:  MEDICATIONS  (STANDING):  amLODIPine Tablet 2.5 milliGRAM(s) Oral daily  atenolol Tablet 25 milliGRAM(s) Oral daily  atorvastatin 10 milliGRAM(s) Oral at bedtime  enoxaparin Injectable 40 milliGRAM(s) SubCutaneous every 24 hours  lisinopril 10 milliGRAM(s) Oral daily  montelukast 10 milliGRAM(s) Oral daily  pantoprazole Tablet 40 milliGRAM(s) Oral before breakfast    MEDICATIONS  (PRN):  ibuprofen Tablet. 200 milliGRAM(s) Oral every 8 hours PRN Temp greater or equal to 38C (100.4F), Mild Pain (1 - 3)  polyethylene glycol 3350 17 Gram(s) Oral two times a day PRN Constipation  senna 2 Tablet(s) Oral at bedtime PRN Constipation    ALLERGIES:  narcotic analgesics (Other)      LABS:                        7.4    12.39 )-----------( 415      ( 14 Nov 2024 08:13 )             23.9     11-14    130[L]  |  98  |  19  ----------------------------<  123[H]  4.5   |  21[L]  |  0.76    Ca    8.7      14 Nov 2024 08:13  Phos  3.0     11-14  Mg     1.8     11-14    TPro  6.3  /  Alb  2.8[L]  /  TBili  0.2  /  DBili  x   /  AST  49[H]  /  ALT  42  /  AlkPhos  245[H]  11-14      Urinalysis Basic - ( 14 Nov 2024 08:13 )    Color: x / Appearance: x / SG: x / pH: x  Gluc: 123 mg/dL / Ketone: x  / Bili: x / Urobili: x   Blood: x / Protein: x / Nitrite: x   Leuk Esterase: x / RBC: x / WBC x   Sq Epi: x / Non Sq Epi: x / Bacteria: x    CAPILLARY BLOOD GLUCOSE    RADIOLOGY & ADDITIONAL TESTS: Reviewed.

## 2024-11-15 NOTE — DISCHARGE NOTE PROVIDER - HOSPITAL COURSE
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 Arvizu), vestibular neuritis, remote ovarian cancer, HTN, HLD presenting with several weeks of fever of unknown origin refractory to augmentin outpatient and abdominal pain. Pt was evaluated by heme/onc and ID, who were concerned for possible liver abscess vs. chemoport infx vs. other unknown infx. Pt underwent extensive workup to r/o infx. Abd u/s did not show any acute findings. MRI liver showed no significant change in intrahepatic cholangiocarcinoma, new hypoenhancement throughout the left hepatic lobe likely represents posttreatment change, persistent intrahepatic biliary ductal dilatation secondary to the   intrahepatic cholangiocarcinoma. Full RVP negative, CXR negative, urine legionella neg, ucx growing normal urogenital [place], hepatitis panel neg, HIV neg. She was also evaluated by surgery for evaluation of possible resection of cholangiocarinoma s/p chemo. While admitted, pt had one febrile episode to 100.6F. Fevers likely related to malignancy.    Hospital course (by problem):     Patient was discharged to: home    New medications: none  Changes to old medications: none  Medications that were stopped: none    Items to follow up as outpatient: oncology, surgery    Physical exam at the time of discharge:  General: NAD  HEENT: PERRL, EOM, anicteric sclera, MMM  Cardiovascular: +S1/S2; RRR; no M/R/G  Respiratory: CTAB; no W/R/R  Gastrointestinal: soft, NT/ND; +BS x4  Extremities: WWP; 2+ peripheral pulses bilaterally; no LE edema  Skin: normal color and turgor; no rash  Neurologic: AOx3, no focal deficits         #Discharge: do not delete    Patient is a 79F PMHx 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 Arvizu), vestibular neuritis, remote ovarian cancer, HTN, HLD presenting with several weeks of fever of unknown origin refractory to augmentin outpatient and abdominal pain. Pt was evaluated by heme/onc and ID, who were concerned for possible liver abscess vs. chemoport infx vs. other unknown infx. Pt underwent extensive workup to r/o infx. Abd u/s did not show any acute findings. MRI liver showed no significant change in intrahepatic cholangiocarcinoma, new hypoenhancement throughout the left hepatic lobe likely represents posttreatment change, persistent intrahepatic biliary ductal dilatation secondary to the   intrahepatic cholangiocarcinoma. Full RVP negative, CXR negative, urine legionella neg, ucx growing normal urogenital [place], hepatitis panel neg, HIV neg. She was also evaluated by surgery for evaluation of possible resection of cholangiocarinoma s/p chemo. While admitted, pt had one febrile episode to 100.6F. Pt was started on zosyn 4.5mg q8hr for empiric treatment. Fevers likely related to malignancy.    Hospital course (by problem):     Patient was discharged to: home    New medications: none  Changes to old medications: none  Medications that were stopped: none    Items to follow up as outpatient: oncology, surgery    Physical exam at the time of discharge:  General: NAD  HEENT: PERRL, EOM, anicteric sclera, MMM  Cardiovascular: +S1/S2; RRR; no M/R/G  Respiratory: CTAB; no W/R/R  Gastrointestinal: soft, NT/ND; +BS x4  Extremities: WWP; 2+ peripheral pulses bilaterally; no LE edema  Skin: normal color and turgor; no rash  Neurologic: AOx3, no focal deficits         #Discharge: do not delete    Patient is a 79F PMHx 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 Arvizu), vestibular neuritis, remote ovarian cancer, HTN, HLD presenting with several weeks of fever of unknown origin refractory to augmentin outpatient and abdominal pain. Pt was evaluated by heme/onc and ID, who were concerned for possible liver abscess vs. chemoport infx vs. other unknown infx. Pt underwent extensive workup to r/o infx. Abd u/s did not show any acute findings. MRI liver showed no significant change in intrahepatic cholangiocarcinoma, new hypoenhancement throughout the left hepatic lobe likely represents posttreatment change, persistent intrahepatic biliary ductal dilatation secondary to the   intrahepatic cholangiocarcinoma. Full RVP negative, CXR negative, urine legionella neg, ucx growing normal urogenital [place], hepatitis panel neg, HIV neg. She was also evaluated by surgery for evaluation of possible resection of cholangiocarinoma s/p chemo. While admitted, pt had one febrile episode to 100.6F. Pt was started on zosyn 4.5mg q8hr for empiric treatment. Fevers likely related to malignancy.    Hospital course (by problem):     Fever of unknown origin  - wks of episodic fevers, with active cholangiocarcinoma on chemo following [facility], with simultaneous new moderate, episodic abd pain that migrates around abd, no leukocytosis but on chemo.   - initial ED w/u on 11/1 negative for bacteremia, UTI, progression of malignancy per imaging. given fever + abd pain, ddx liver abscess, sbp, cholangitis, possibly central line infection of chemo port. Possibly tumor/chemo fevers. Fevers refractory to 5d of Augmentin.   - CXR unremarkable. UA largely unremarkable, ucx growing normal urogenital pedrito. Urine strep neg. COVID, flu, RSV neg. Full RVP neg. Hepatitis neg. HIV neg.  - RUQ/abdominal u/s 11/13: Since November 1, 2024, no significant change since the CT exam.   - MRI abd 11/14: no significant change in intrahepatic cholangiocarcinoma, new hypo-enhancement throughout L hepatic lobe likely represents post-tx change, persistent intrahepatic biliary ductal dilatation  - 11/14: Fever to 100.6 with subjective chills   - afebrile since 11/14 AM. ; Procalcitonin 0.2. UA negative for bacteruria. RVP negative.   - Per Oncology Recs: Started on Zosyn on 11/15 for 48 hours.   -  blood cultures drawn in ED - NGTD   - blood cultures from chemoport - NGTD.      Cholangiocarcinoma   - Active malignancy, following Dr. Arvizu, seen most recently several days ago, aware of ongoing fevers. Pt was due for 6 cycles of gemcitabine/cisplatin/durvalumab, however only completed 4 out of 6.  - S/p embolization in May with IR and on active chemo, last chem was end of October next dose planned for later this month pending fever w/u.  Per pt, she is also to follow up with a surgeon Dr. Loredo regarding possible surgical resection.  -MRI liver wo acute change in lesion. Intrahepatic ductal dilation present.   - Surgery and Heme/Onc following     Normocytic anemia   - 7.7 from 8.5 from 7.4 from 8 from 9.2.   - Patient endorsed persistent fatigue. Unclear if from anemia or combination of anemia and recent chemotherapy   - No signs of  acute blood loss. Possibly AoCD i/s/o malignancy.  - Given 1 unit PRBC on 11/17       Hyponatremia   - Sodium of 130 on 14 November and 132 the following day   - continued to monitor   - resolved on 11/16, Na: 135     Vestibular neuritis.   Pt reports a hx of vestibular neuritis for which she has been undergoing PT. States it causes recurrent HA, dizziness, blurry vision. Usually takes ibuprofen for symptomatic relief.  - ibuprofen 200mg q8hr prn.    Asthma.   on home montelukast 10mg QD, albuterol prn  - c/w home meds.    GERD (gastroesophageal reflux disease).   chronic since starting chemo, on home prilosec.   - continue with home meds.    Hypertension & Hyperlipidemia   on home amlodipine benzapril 2.5-10mg qD, atenolol 25mg qD. Atorvastatin 10mg qd  - c/w atenolol 25mg qd, lisinopril 10mg for benzapril TI, amlodipine 2.5mg qD. Atorvastatin 10mg qd      Patient was discharged to: home    New medications: none  Changes to old medications: none  Medications that were stopped: none    Items to follow up as outpatient: oncology, surgery    Physical exam at the time of discharge:  General: NAD  HEENT: PERRL, EOM, anicteric sclera, MMM  Cardiovascular: +S1/S2; RRR; no M/R/G  Respiratory: CTAB; no W/R/R  Gastrointestinal: soft, NT/ND; +BS x4  Extremities: WWP; 2+ peripheral pulses bilaterally; no LE edema  Skin: normal color and turgor; no rash  Neurologic: AOx3, no focal deficits         #Discharge: do not delete    Patient is a 79F PMHx 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 Arvizu), vestibular neuritis, remote ovarian cancer, HTN, HLD presenting with several weeks of fever of unknown origin refractory to augmentin outpatient and abdominal pain. Pt was evaluated by heme/onc and ID, who were concerned for possible liver abscess vs. chemoport infx vs. other unknown infx. Pt underwent extensive workup to r/o infx. Abd u/s did not show any acute findings. MRI liver showed no significant change in intrahepatic cholangiocarcinoma, new hypoenhancement throughout the left hepatic lobe likely represents posttreatment change, persistent intrahepatic biliary ductal dilatation secondary to the   intrahepatic cholangiocarcinoma. Full RVP negative, CXR negative, urine legionella neg, ucx growing normal urogenital [place], hepatitis panel neg, HIV neg. She was also evaluated by surgery for evaluation of possible resection of cholangiocarinoma s/p chemo. While admitted, pt had one febrile episode to 100.6F. Pt was started on zosyn 4.5mg q8hr for empiric treatment. She was also given 1u of pRBC for symptomatic anemia. Fevers likely related to malignancy, but will continue with flagyl 500mg TID and cefpodoxime 200mg BID for total 5d course. Pt should f/u with her oncologist, Dr. Arvizu, as well as surgery team.     Hospital course (by problem):     Fever of unknown origin  - wks of episodic fevers, with active cholangiocarcinoma on chemo following [facility], with simultaneous new moderate, episodic abd pain that migrates around abd, no leukocytosis but on chemo.   - initial ED w/u on 11/1 negative for bacteremia, UTI, progression of malignancy per imaging. given fever + abd pain, ddx liver abscess, sbp, cholangitis, possibly central line infection of chemo port. Possibly tumor/chemo fevers. Fevers refractory to 5d of Augmentin.   - CXR unremarkable. UA largely unremarkable, ucx growing normal urogenital pedrito. Urine strep neg. COVID, flu, RSV neg. Full RVP neg. Hepatitis neg. HIV neg.  - RUQ/abdominal u/s 11/13: Since November 1, 2024, no significant change since the CT exam.   - MRI abd 11/14: no significant change in intrahepatic cholangiocarcinoma, new hypo-enhancement throughout L hepatic lobe likely represents post-tx change, persistent intrahepatic biliary ductal dilatation  - 11/14: Fever to 100.6 with subjective chills   - afebrile since 11/14 AM. ; Procalcitonin 0.2. UA negative for bacteruria. RVP negative.   - S/p Zosyn on 11/15 for 48 hours.   - blood cultures drawn in ED - NGTD   - blood cultures from chemoport - NGTD.  - pt should c/w cefpodoxime 200mg BID and flagyl 500mg TID x 5d total course    Cholangiocarcinoma   - Active malignancy, following Dr. Arvizu, seen most recently several days ago, aware of ongoing fevers. Pt was due for 6 cycles of gemcitabine/cisplatin/durvalumab, however only completed 4 out of 6.  - S/p embolization in May with IR and on active chemo, last chem was end of October next dose planned for later this month pending fever w/u.  Per pt, she is also to follow up with a surgeon Dr. Loredo regarding possible surgical resection.  -MRI liver wo acute change in lesion. Intrahepatic ductal dilation present.   - Pt should f/u with heme/onc and surgery outpatient    Normocytic anemia   - 7.7 from 8.5 from 7.4 from 8 from 9.2.   - Patient endorsed persistent fatigue. Unclear if from anemia or combination of anemia and recent chemotherapy   - No signs of  acute blood loss. Possibly AoCD i/s/o malignancy.  - Given 1 unit PRBC on 11/17     Hyponatremia   - Sodium of 130 on 14 November and 132 the following day   - continued to monitor   - resolved on 11/16, Na: 135     Vestibular neuritis.   Pt reports a hx of vestibular neuritis for which she has been undergoing PT. States it causes recurrent HA, dizziness, blurry vision. Usually takes ibuprofen for symptomatic relief.  - ibuprofen 200mg q8hr prn.    Asthma.   on home montelukast 10mg QD, albuterol prn  - c/w home meds.    GERD (gastroesophageal reflux disease).   chronic since starting chemo, on home prilosec.   - continue with home meds.    Hypertension & Hyperlipidemia   on home amlodipine benzapril 2.5-10mg qD, atenolol 25mg qD. Atorvastatin 10mg qd  - c/w atenolol 25mg qd, lisinopril 10mg for benzapril TI, amlodipine 2.5mg qD. Atorvastatin 10mg qd    Patient was discharged to: home    New medications: flagyl 500mg TID, cefpodoxime 200mg BID  Changes to old medications: none  Medications that were stopped: none    Items to follow up as outpatient: oncology, surgery    Physical exam at the time of discharge:  General: Well-appearing, no apparent distress  HEENT: Anicteric sclera  Neck: Supple  Cardiovascular: Regular rate and rhythm, +S1/S2  Respiratory: Clear to auscultation B/L; no wheezes, rales, or rhonchi; pt breathing without difficulty and speaking in full sentences  Gastrointestinal: Some firmness at the epigastric region and right upper quadrant, otherwise soft; denies tenderness to palpation; hepatomegaly present; +BSx4  Extremities: Warm and well perfused; no edema, erythema, edema, rash, clubbing, or cyanosis of visibile skin; bilateral calves soft and nontender  Vascular: Radial and dorsalis pedis palpable B/L  Neurological: A&Ox3; no focal deficits  Psychiatric: Pleasant mood and affect  Dermatologic: No appreciable wounds, rash, damage to the visible skin; chemo port located at right upper chest without erythema, ecchymosis, rash

## 2024-11-15 NOTE — PROGRESS NOTE ADULT - SUBJECTIVE AND OBJECTIVE BOX
OVERNIGHT EVENTS:    SUBJECTIVE / INTERVAL HPI: Patient seen and examined at bedside.       PHYSICAL EXAM:    General: NAD  HEENT: NC/AT; PERRL, anicteric sclera; MMM  Neck: supple  Cardiovascular: +S1/S2, RRR  Respiratory: CTA B/L; no W/R/R  Gastrointestinal: soft, NT/ND; +BSx4  Extremities: WWP; no edema, clubbing or cyanosis  Vascular: 2+ radial, DP/PT pulses B/L  Neurological: AAOx3; no focal deficits  Psychiatric: pleasant mood and affect  Dermatologic: no appreciable wounds or damage to the skin    VITAL SIGNS:  Vital Signs Last 24 Hrs  T(C): 36.9 (15 Nov 2024 06:04), Max: 37.1 (15 Nov 2024 02:00)  T(F): 98.4 (15 Nov 2024 06:04), Max: 98.7 (15 Nov 2024 02:00)  HR: 103 (15 Nov 2024 06:04) (82 - 103)  BP: 122/88 (15 Nov 2024 06:04) (103/61 - 122/88)  BP(mean): 100 (15 Nov 2024 06:04) (100 - 100)  RR: 16 (15 Nov 2024 06:04) (16 - 18)  SpO2: 98% (15 Nov 2024 06:04) (98% - 100%)    Parameters below as of 15 Nov 2024 06:04  Patient On (Oxygen Delivery Method): room air          MEDICATIONS:  MEDICATIONS  (STANDING):  amLODIPine   Tablet 2.5 milliGRAM(s) Oral daily  atenolol  Tablet 25 milliGRAM(s) Oral daily  atorvastatin 10 milliGRAM(s) Oral at bedtime  enoxaparin Injectable 40 milliGRAM(s) SubCutaneous every 24 hours  lisinopril 10 milliGRAM(s) Oral daily  montelukast 10 milliGRAM(s) Oral daily  pantoprazole    Tablet 40 milliGRAM(s) Oral before breakfast    MEDICATIONS  (PRN):  ibuprofen  Tablet. 200 milliGRAM(s) Oral every 8 hours PRN Temp greater or equal to 38C (100.4F), Mild Pain (1 - 3)  polyethylene glycol 3350 17 Gram(s) Oral two times a day PRN Constipation  senna 2 Tablet(s) Oral at bedtime PRN Constipation      ALLERGIES:  Allergies    narcotic analgesics (Other)    Intolerances        LABS:                        7.4    12.39 )-----------( 415      ( 14 Nov 2024 08:13 )             23.9     11-14    130[L]  |  98  |  19  ----------------------------<  123[H]  4.5   |  21[L]  |  0.76    Ca    8.7      14 Nov 2024 08:13  Phos  3.0     11-14  Mg     1.8     11-14    TPro  6.3  /  Alb  2.8[L]  /  TBili  0.2  /  DBili  x   /  AST  49[H]  /  ALT  42  /  AlkPhos  245[H]  11-14      Urinalysis Basic - ( 14 Nov 2024 08:13 )    Color: x / Appearance: x / SG: x / pH: x  Gluc: 123 mg/dL / Ketone: x  / Bili: x / Urobili: x   Blood: x / Protein: x / Nitrite: x   Leuk Esterase: x / RBC: x / WBC x   Sq Epi: x / Non Sq Epi: x / Bacteria: x      CAPILLARY BLOOD GLUCOSE          RADIOLOGY & ADDITIONAL TESTS: Reviewed. OVERNIGHT EVENTS: Pt afebrile. BP decreased to 103/61, held lisinopril and amlodipine, then BP normalized.    SUBJECTIVE / INTERVAL HPI: Patient seen and examined at bedside. Pt denies feeling feverish overnight and this morning. She reports that her bowel movements have improved; she had a bowel movement last night without straining. She also reports passage of gas today. Pt states that she was able to tolerate her dinner well. She reports some diffuse discomfort of her abdomen this morning; she states "it's not pain, it's tender" and is alleviated with rest and provoked with standing up and sitting down. Pt ambulated independently in the hospital hallway today.    Denies chills, night sweats, chest pain, shortness of breath, dizziness, nausea, vomiting, loss of appetite.     PHYSICAL EXAM:  General: Well-appearing, no apparent distress  HEENT: Anicteric sclera  Neck: Supple  Cardiovascular: Regular rate and rhythm, +S1/S2  Respiratory: Clear to auscultation B/L; no wheezes, rales, or rhonchi; pt breathing without difficulty and speaking in full sentences  Gastrointestinal: Some firmness at the epigastric region and right upper quadrant, otherwise soft; mild diffuse tenderness to palpation; hepatomegaly present; +BSx4  Extremities: Warm and well perfused; no edema, erythema, edema, rash, clubbing, or cyanosis of visibile skin; bilateral calves soft and nontender  Vascular: Radial and dorsalis pedis palpable B/L  Neurological: A&Ox3; no focal deficits  Psychiatric: Pleasant mood and affect  Dermatologic: No appreciable wounds, rash, damage to the visible skin; chemo port located at right upper chest without erythema, ecchymosis, rash    VITAL SIGNS:  Vital Signs Last 24 Hrs  T(C): 36.9 (15 Nov 2024 06:04), Max: 37.1 (15 Nov 2024 02:00)  T(F): 98.4 (15 Nov 2024 06:04), Max: 98.7 (15 Nov 2024 02:00)  - rechecked during morning rounds, 98.1 F  HR: 103 (15 Nov 2024 06:04) (82 - 103) - rechecked during morning rounds, 85 beats per minute  BP: 122/88 (15 Nov 2024 06:04) (103/61 - 122/88)  - rechecked during morning rounds, 129/77 mmHg  BP(mean): 100 (15 Nov 2024 06:04) (100 - 100)  RR: 16 (15 Nov 2024 06:04) (16 - 18)  SpO2: 98% (15 Nov 2024 06:04) (98% - 100%)    Parameters below as of 15 Nov 2024 06:04  Patient On (Oxygen Delivery Method): room air    MEDICATIONS:  MEDICATIONS  (STANDING):  amLODIPine Tablet 2.5 milliGRAM(s) Oral daily  atenolol Tablet 25 milliGRAM(s) Oral daily  atorvastatin 10 milliGRAM(s) Oral at bedtime  enoxaparin Injectable 40 milliGRAM(s) SubCutaneous every 24 hours  lisinopril 10 milliGRAM(s) Oral daily  montelukast 10 milliGRAM(s) Oral daily  pantoprazole Tablet 40 milliGRAM(s) Oral before breakfast    MEDICATIONS  (PRN):  ibuprofen Tablet. 200 milliGRAM(s) Oral every 8 hours PRN Temp greater or equal to 38C (100.4F), Mild Pain (1 - 3)  polyethylene glycol 3350 17 Gram(s) Oral two times a day PRN Constipation  senna 2 Tablet(s) Oral at bedtime PRN Constipation    ALLERGIES:  narcotic analgesics (Other)      LABS:                        7.4    12.39 )-----------( 415      ( 14 Nov 2024 08:13 )             23.9     11-14    130[L]  |  98  |  19  ----------------------------<  123[H]  4.5   |  21[L]  |  0.76    Ca    8.7      14 Nov 2024 08:13  Phos  3.0     11-14  Mg     1.8     11-14    TPro  6.3  /  Alb  2.8[L]  /  TBili  0.2  /  DBili  x   /  AST  49[H]  /  ALT  42  /  AlkPhos  245[H]  11-14      Urinalysis Basic - ( 14 Nov 2024 08:13 )    Color: x / Appearance: x / SG: x / pH: x  Gluc: 123 mg/dL / Ketone: x  / Bili: x / Urobili: x   Blood: x / Protein: x / Nitrite: x   Leuk Esterase: x / RBC: x / WBC x   Sq Epi: x / Non Sq Epi: x / Bacteria: x    CAPILLARY BLOOD GLUCOSE    RADIOLOGY & ADDITIONAL TESTS: Reviewed.

## 2024-11-16 LAB
ALBUMIN SERPL ELPH-MCNC: 3 G/DL — LOW (ref 3.3–5)
ALP SERPL-CCNC: 242 U/L — HIGH (ref 40–120)
ALT FLD-CCNC: 44 U/L — SIGNIFICANT CHANGE UP (ref 10–45)
ANION GAP SERPL CALC-SCNC: 10 MMOL/L — SIGNIFICANT CHANGE UP (ref 5–17)
AST SERPL-CCNC: 42 U/L — HIGH (ref 10–40)
BASOPHILS # BLD AUTO: 0 K/UL — SIGNIFICANT CHANGE UP (ref 0–0.2)
BASOPHILS NFR BLD AUTO: 0 % — SIGNIFICANT CHANGE UP (ref 0–2)
BILIRUB SERPL-MCNC: 0.2 MG/DL — SIGNIFICANT CHANGE UP (ref 0.2–1.2)
BUN SERPL-MCNC: 21 MG/DL — SIGNIFICANT CHANGE UP (ref 7–23)
CALCIUM SERPL-MCNC: 8.7 MG/DL — SIGNIFICANT CHANGE UP (ref 8.4–10.5)
CHLORIDE SERPL-SCNC: 101 MMOL/L — SIGNIFICANT CHANGE UP (ref 96–108)
CO2 SERPL-SCNC: 24 MMOL/L — SIGNIFICANT CHANGE UP (ref 22–31)
CREAT SERPL-MCNC: 0.78 MG/DL — SIGNIFICANT CHANGE UP (ref 0.5–1.3)
EGFR: 78 ML/MIN/1.73M2 — SIGNIFICANT CHANGE UP
EOSINOPHIL # BLD AUTO: 0 K/UL — SIGNIFICANT CHANGE UP (ref 0–0.5)
EOSINOPHIL NFR BLD AUTO: 0 % — SIGNIFICANT CHANGE UP (ref 0–6)
GLUCOSE SERPL-MCNC: 110 MG/DL — HIGH (ref 70–99)
HCT VFR BLD CALC: 24.7 % — LOW (ref 34.5–45)
HGB BLD-MCNC: 7.7 G/DL — LOW (ref 11.5–15.5)
LYMPHOCYTES # BLD AUTO: 0.57 K/UL — LOW (ref 1–3.3)
LYMPHOCYTES # BLD AUTO: 5.3 % — LOW (ref 13–44)
MAGNESIUM SERPL-MCNC: 1.9 MG/DL — SIGNIFICANT CHANGE UP (ref 1.6–2.6)
MCHC RBC-ENTMCNC: 31.2 G/DL — LOW (ref 32–36)
MCHC RBC-ENTMCNC: 31.2 PG — SIGNIFICANT CHANGE UP (ref 27–34)
MCV RBC AUTO: 100 FL — SIGNIFICANT CHANGE UP (ref 80–100)
MONOCYTES # BLD AUTO: 1.13 K/UL — HIGH (ref 0–0.9)
MONOCYTES NFR BLD AUTO: 10.5 % — SIGNIFICANT CHANGE UP (ref 2–14)
NEUTROPHILS # BLD AUTO: 8.76 K/UL — HIGH (ref 1.8–7.4)
NEUTROPHILS NFR BLD AUTO: 81.6 % — HIGH (ref 43–77)
PHOSPHATE SERPL-MCNC: 3.4 MG/DL — SIGNIFICANT CHANGE UP (ref 2.5–4.5)
PLATELET # BLD AUTO: 395 K/UL — SIGNIFICANT CHANGE UP (ref 150–400)
POTASSIUM SERPL-MCNC: 4.2 MMOL/L — SIGNIFICANT CHANGE UP (ref 3.5–5.3)
POTASSIUM SERPL-SCNC: 4.2 MMOL/L — SIGNIFICANT CHANGE UP (ref 3.5–5.3)
PROT SERPL-MCNC: 6.3 G/DL — SIGNIFICANT CHANGE UP (ref 6–8.3)
RBC # BLD: 2.47 M/UL — LOW (ref 3.8–5.2)
RBC # FLD: 15.9 % — HIGH (ref 10.3–14.5)
SODIUM SERPL-SCNC: 135 MMOL/L — SIGNIFICANT CHANGE UP (ref 135–145)
WBC # BLD: 10.74 K/UL — HIGH (ref 3.8–10.5)
WBC # FLD AUTO: 10.74 K/UL — HIGH (ref 3.8–10.5)

## 2024-11-16 RX ORDER — LORAZEPAM 2 MG/1
0.5 TABLET ORAL EVERY 12 HOURS
Refills: 0 | Status: DISCONTINUED | OUTPATIENT
Start: 2024-11-16 | End: 2024-11-19

## 2024-11-16 RX ORDER — LORAZEPAM 2 MG/1
0.5 TABLET ORAL ONCE
Refills: 0 | Status: DISCONTINUED | OUTPATIENT
Start: 2024-11-16 | End: 2024-11-16

## 2024-11-16 RX ORDER — PIPERACILLIN SODIUM AND TAZOBACTAM SODIUM 4; .5 G/20ML; G/20ML
4.5 INJECTION, POWDER, LYOPHILIZED, FOR SOLUTION INTRAVENOUS EVERY 8 HOURS
Refills: 0 | Status: DISCONTINUED | OUTPATIENT
Start: 2024-11-16 | End: 2024-11-19

## 2024-11-16 RX ORDER — PIPERACILLIN SODIUM AND TAZOBACTAM SODIUM 4; .5 G/20ML; G/20ML
4.5 INJECTION, POWDER, LYOPHILIZED, FOR SOLUTION INTRAVENOUS ONCE
Refills: 0 | Status: COMPLETED | OUTPATIENT
Start: 2024-11-16 | End: 2024-11-16

## 2024-11-16 RX ADMIN — Medication 200 MILLIGRAM(S): at 01:35

## 2024-11-16 RX ADMIN — MONTELUKAST SODIUM 10 MILLIGRAM(S): 10 TABLET ORAL at 22:27

## 2024-11-16 RX ADMIN — PIPERACILLIN SODIUM AND TAZOBACTAM SODIUM 25 GRAM(S): 4; .5 INJECTION, POWDER, LYOPHILIZED, FOR SOLUTION INTRAVENOUS at 22:25

## 2024-11-16 RX ADMIN — PANTOPRAZOLE SODIUM 40 MILLIGRAM(S): 40 TABLET, DELAYED RELEASE ORAL at 06:49

## 2024-11-16 RX ADMIN — PIPERACILLIN SODIUM AND TAZOBACTAM SODIUM 200 GRAM(S): 4; .5 INJECTION, POWDER, LYOPHILIZED, FOR SOLUTION INTRAVENOUS at 09:44

## 2024-11-16 RX ADMIN — Medication 10 MILLIGRAM(S): at 22:27

## 2024-11-16 RX ADMIN — LORAZEPAM 0.5 MILLIGRAM(S): 2 TABLET ORAL at 00:37

## 2024-11-16 RX ADMIN — LORAZEPAM 0.5 MILLIGRAM(S): 2 TABLET ORAL at 09:44

## 2024-11-16 RX ADMIN — LISINOPRIL 10 MILLIGRAM(S): 20 TABLET ORAL at 22:27

## 2024-11-16 RX ADMIN — ENOXAPARIN SODIUM 40 MILLIGRAM(S): 30 INJECTION SUBCUTANEOUS at 22:26

## 2024-11-16 RX ADMIN — PIPERACILLIN SODIUM AND TAZOBACTAM SODIUM 25 GRAM(S): 4; .5 INJECTION, POWDER, LYOPHILIZED, FOR SOLUTION INTRAVENOUS at 15:39

## 2024-11-16 RX ADMIN — Medication 200 MILLIGRAM(S): at 00:43

## 2024-11-16 RX ADMIN — AMLODIPINE BESYLATE 2.5 MILLIGRAM(S): 10 TABLET ORAL at 22:27

## 2024-11-16 RX ADMIN — ATENOLOL 25 MILLIGRAM(S): 100 TABLET ORAL at 06:49

## 2024-11-16 RX ADMIN — LORAZEPAM 0.5 MILLIGRAM(S): 2 TABLET ORAL at 22:27

## 2024-11-16 NOTE — PROGRESS NOTE ADULT - ASSESSMENT
79F recent dx of unresectable intrahepatic cholangiocarcinoma (dx April 2024), s/p y90 embolization (05 and 06/24) x2, s/p 5 cycles of gem/cis/durvalumab, admitted to medicine service for fevers of unknown origin, infectious workup negative thus far. No acute surgical intervention at this time, further surgical planning/consideration pending continued work up.    Recs:  - MRI reviewed, no identifiable source of fevers noted  - no acute surgical intervention at this time    Team 1C will continue to follow, please reach out with any questions/concerns

## 2024-11-16 NOTE — PROGRESS NOTE ADULT - PROBLEM SELECTOR PLAN 2
Active malignancy, following Dr. Arvizu, seen most recently several days ago, aware of ongoing fevers. Pt was due for 6 cycles of gemcitabine/cisplatin/durvalumab, however only completed 4 out of 6.  S/p embolization in May with IR and on active chemo, last chem was end of October next dose planned for later this month pending fever w/u.  Per pt, she is also to follow up with a surgeon Dr. Loredo regarding possible surgical resection.    Plan  - f/u heme/onc recs Active malignancy, following Dr. Arvizu, seen most recently several days ago, aware of ongoing fevers. Pt was due for 6 cycles of gemcitabine/cisplatin/durvalumab, however only completed 4 out of 6.  S/p embolization in May with IR and on active chemo, last chem was end of October next dose planned for later this month pending fever w/u.  Per pt, she is also to follow up with a surgeon Dr. Loredo regarding possible surgical resection.    Plan  - f/u heme/onc recs  - f/u surgery recs Cellcept Pregnancy And Lactation Text: This medication is Pregnancy Category D and isn't considered safe during pregnancy. It is unknown if this medication is excreted in breast milk.

## 2024-11-16 NOTE — PROGRESS NOTE ADULT - SUBJECTIVE AND OBJECTIVE BOX
OVERNIGHT EVENTS:    SUBJECTIVE / INTERVAL HPI: Patient seen and examined at bedside.       PHYSICAL EXAM:    General: NAD  HEENT: NC/AT; PERRL, anicteric sclera; MMM  Neck: supple  Cardiovascular: +S1/S2, RRR  Respiratory: CTA B/L; no W/R/R  Gastrointestinal: soft, NT/ND; +BSx4  Extremities: WWP; no edema, clubbing or cyanosis  Vascular: 2+ radial, DP/PT pulses B/L  Neurological: AAOx3; no focal deficits  Psychiatric: pleasant mood and affect  Dermatologic: no appreciable wounds or damage to the skin    VITAL SIGNS:  Vital Signs Last 24 Hrs  T(C): 36.7 (16 Nov 2024 06:08), Max: 37.1 (16 Nov 2024 01:57)  T(F): 98 (16 Nov 2024 06:08), Max: 98.8 (16 Nov 2024 01:57)  HR: 94 (16 Nov 2024 06:08) (84 - 106)  BP: 111/72 (16 Nov 2024 06:08) (110/68 - 119/72)  BP(mean): 85 (16 Nov 2024 06:08) (85 - 85)  RR: 16 (16 Nov 2024 06:08) (16 - 17)  SpO2: 98% (16 Nov 2024 06:08) (97% - 99%)    Parameters below as of 16 Nov 2024 06:08  Patient On (Oxygen Delivery Method): room air          MEDICATIONS:  MEDICATIONS  (STANDING):  amLODIPine   Tablet 2.5 milliGRAM(s) Oral daily  atenolol  Tablet 25 milliGRAM(s) Oral daily  atorvastatin 10 milliGRAM(s) Oral at bedtime  enoxaparin Injectable 40 milliGRAM(s) SubCutaneous every 24 hours  lisinopril 10 milliGRAM(s) Oral daily  montelukast 10 milliGRAM(s) Oral daily  pantoprazole    Tablet 40 milliGRAM(s) Oral before breakfast  piperacillin/tazobactam IVPB.. 4.5 Gram(s) IV Intermittent every 8 hours    MEDICATIONS  (PRN):  ibuprofen  Tablet. 200 milliGRAM(s) Oral every 8 hours PRN Temp greater or equal to 38C (100.4F), Mild Pain (1 - 3)  polyethylene glycol 3350 17 Gram(s) Oral two times a day PRN Constipation  senna 2 Tablet(s) Oral at bedtime PRN Constipation      ALLERGIES:  Allergies    narcotic analgesics (Other)    Intolerances        LABS:                        8.5    10.26 )-----------( 418      ( 15 Nov 2024 05:30 )             27.3     11-15    132[L]  |  101  |  19  ----------------------------<  110[H]  4.6   |  25  |  0.78    Ca    9.1      15 Nov 2024 05:30  Phos  3.6     11-15  Mg     1.9     11-15    TPro  6.7  /  Alb  3.0[L]  /  TBili  0.2  /  DBili  x   /  AST  41[H]  /  ALT  42  /  AlkPhos  246[H]  11-15      Urinalysis Basic - ( 15 Nov 2024 05:30 )    Color: x / Appearance: x / SG: x / pH: x  Gluc: 110 mg/dL / Ketone: x  / Bili: x / Urobili: x   Blood: x / Protein: x / Nitrite: x   Leuk Esterase: x / RBC: x / WBC x   Sq Epi: x / Non Sq Epi: x / Bacteria: x      CAPILLARY BLOOD GLUCOSE          RADIOLOGY & ADDITIONAL TESTS: Reviewed. OVERNIGHT EVENTS: Pt given ativan 0.5mg for anxiety.    SUBJECTIVE / INTERVAL HPI: Patient seen and examined at bedside. Pt resting comfortably. Pt states she feels somewhat better this morning. She is concerned about starting antibiotics given unclear source of infection and says she feels anxious. Her abdominal pain is improved. Denies any fevers or chills overnight. Denies HA, chest pain, SOB, n/v/d, dysuria. ROS otherwise negative.      PHYSICAL EXAM:    General: NAD  HEENT: NC/AT; PERRL, anicteric sclera; MMM  Neck: supple  Cardiovascular: +S1/S2, RRR  Respiratory: CTA B/L; no W/R/R  Gastrointestinal: soft, NT/ND; +BSx4; hepatomegaly present with firmness in epigastric area and RUQ  Extremities: WWP; no edema, clubbing or cyanosis  Vascular: 2+ radial, DP/PT pulses B/L  Neurological: AAOx3; no focal deficits  Psychiatric: pleasant mood and affect  Dermatologic: no appreciable wounds or damage to the skin    VITAL SIGNS:  Vital Signs Last 24 Hrs  T(C): 36.7 (16 Nov 2024 06:08), Max: 37.1 (16 Nov 2024 01:57)  T(F): 98 (16 Nov 2024 06:08), Max: 98.8 (16 Nov 2024 01:57)  HR: 94 (16 Nov 2024 06:08) (84 - 106)  BP: 111/72 (16 Nov 2024 06:08) (110/68 - 119/72)  BP(mean): 85 (16 Nov 2024 06:08) (85 - 85)  RR: 16 (16 Nov 2024 06:08) (16 - 17)  SpO2: 98% (16 Nov 2024 06:08) (97% - 99%)    Parameters below as of 16 Nov 2024 06:08  Patient On (Oxygen Delivery Method): room air          MEDICATIONS:  MEDICATIONS  (STANDING):  amLODIPine   Tablet 2.5 milliGRAM(s) Oral daily  atenolol  Tablet 25 milliGRAM(s) Oral daily  atorvastatin 10 milliGRAM(s) Oral at bedtime  enoxaparin Injectable 40 milliGRAM(s) SubCutaneous every 24 hours  lisinopril 10 milliGRAM(s) Oral daily  montelukast 10 milliGRAM(s) Oral daily  pantoprazole    Tablet 40 milliGRAM(s) Oral before breakfast  piperacillin/tazobactam IVPB.. 4.5 Gram(s) IV Intermittent every 8 hours    MEDICATIONS  (PRN):  ibuprofen  Tablet. 200 milliGRAM(s) Oral every 8 hours PRN Temp greater or equal to 38C (100.4F), Mild Pain (1 - 3)  polyethylene glycol 3350 17 Gram(s) Oral two times a day PRN Constipation  senna 2 Tablet(s) Oral at bedtime PRN Constipation      ALLERGIES:  Allergies    narcotic analgesics (Other)    Intolerances        LABS:                        8.5    10.26 )-----------( 418      ( 15 Nov 2024 05:30 )             27.3     11-15    132[L]  |  101  |  19  ----------------------------<  110[H]  4.6   |  25  |  0.78    Ca    9.1      15 Nov 2024 05:30  Phos  3.6     11-15  Mg     1.9     11-15    TPro  6.7  /  Alb  3.0[L]  /  TBili  0.2  /  DBili  x   /  AST  41[H]  /  ALT  42  /  AlkPhos  246[H]  11-15      Urinalysis Basic - ( 15 Nov 2024 05:30 )    Color: x / Appearance: x / SG: x / pH: x  Gluc: 110 mg/dL / Ketone: x  / Bili: x / Urobili: x   Blood: x / Protein: x / Nitrite: x   Leuk Esterase: x / RBC: x / WBC x   Sq Epi: x / Non Sq Epi: x / Bacteria: x      CAPILLARY BLOOD GLUCOSE          RADIOLOGY & ADDITIONAL TESTS: Reviewed.

## 2024-11-16 NOTE — PROGRESS NOTE ADULT - PROBLEM SELECTOR PLAN 1
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 initial ED w/u on 11/1 negative for bacteremia, UTI, progression of malignancy per imaging. given fever + abd pain, ddx liver abscess, sbp, cholangitis, possibly central line infection of chemo port. Possibly tumor/chemo fevers. Fevers refractory to 5d of augmentin.   CXR unremarkable. UA largely unremarkable, ucx growing normal urogenital pedrito. Urine strep neg. COVID, flu, RSV neg. Full RVP neg. Hepatitis neg. HIV neg.  RUQ/abdominal u/s 11/13: Since November 1, 2024, no significant change since the CT exam. Left hepatic vein again not visualized. Left portal vein branch not definitely visualized. Unchanged intrahepatic ductal dilatation and hepatic masses consistent with known cholangiocarcinoma.  11/14: Pt with fever to 100.6F with subjective chills.  11/15: No fevers or focal symptoms while off abx.    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 initial ED w/u on 11/1 negative for bacteremia, UTI, progression of malignancy per imaging. given fever + abd pain, ddx liver abscess, sbp, cholangitis, possibly central line infection of chemo port. Possibly tumor/chemo fevers. Fevers refractory to 5d of augmentin.   CXR unremarkable. UA largely unremarkable, ucx growing normal urogenital pedrito. Urine strep neg. COVID, flu, RSV neg. Full RVP neg. Hepatitis neg. HIV neg.  RUQ/abdominal u/s 11/13: Since November 1, 2024, no significant change since the CT exam. Left hepatic vein again not visualized. Left portal vein branch not definitely visualized. Unchanged intrahepatic ductal dilatation and hepatic masses consistent with known cholangiocarcinoma.  11/14: Pt with fever to 100.6F with subjective chills.  Pt has been afebrile without focal symptoms while off abx since.    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 initial ED w/u on 11/1 negative for bacteremia, UTI, progression of malignancy per imaging. given fever + abd pain, ddx liver abscess, sbp, cholangitis, possibly central line infection of chemo port. Possibly tumor/chemo fevers. Fevers refractory to 5d of augmentin.   CXR unremarkable. UA largely unremarkable, ucx growing normal urogenital pedrito. Urine strep neg. COVID, flu, RSV neg. Full RVP neg. Hepatitis neg. HIV neg.  RUQ/abdominal u/s 11/13: Since November 1, 2024, no significant change since the CT exam. Left hepatic vein again not visualized. Left portal vein branch not definitely visualized. Unchanged intrahepatic ductal dilatation and hepatic masses consistent with known cholangiocarcinoma.  MRI abd 11/14: no significant change in intrahepatic cholangiocarcinoma, new hypoenhancement throughout L hepatic lobe likely represents post-tx change, persistent intrahepatic biliary ductal dilatation  11/14: Pt with fever to 100.6F with subjective chills.  Pt has been afebrile without focal symptoms while off abx since.    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 initial ED w/u on 11/1 negative for bacteremia, UTI, progression of malignancy per imaging. given fever + abd pain, ddx liver abscess, sbp, cholangitis, possibly central line infection of chemo port. Possibly tumor/chemo fevers. Fevers refractory to 5d of augmentin.   CXR unremarkable. UA largely unremarkable, ucx growing normal urogenital pedrito. Urine strep neg. COVID, flu, RSV neg. Full RVP neg. Hepatitis neg. HIV neg.  RUQ/abdominal u/s 11/13: Since November 1, 2024, no significant change since the CT exam. Left hepatic vein again not visualized. Left portal vein branch not definitely visualized. Unchanged intrahepatic ductal dilatation and hepatic masses consistent with known cholangiocarcinoma.  MRI abd 11/14: no significant change in intrahepatic cholangiocarcinoma, new hypoenhancement throughout L hepatic lobe likely represents post-tx change, persistent intrahepatic biliary ductal dilatation  11/14: Pt with fever to 100.6F with subjective chills.  Pt has been afebrile without focal symptoms while off abx since.    Plan  - though w/u has thus far been negative, will start trial of zosyn 4.5mg q8hr to cover any possible infx given immunocompromised state  - f/u surgery recs  - f/u heme/onc recs   - f/u blood cultures drawn in ED - NGTD   - f/u blood cultures from chemoport - NGTD

## 2024-11-16 NOTE — PROGRESS NOTE ADULT - SUBJECTIVE AND OBJECTIVE BOX
INTERVAL HPI/OVERNIGHT EVENTS: NO acute events    SUBJECTIVE: Pt seen and examined at bedside this am by surgery team. Tolerating diet, pain well controlled. Denies f/n/v/cp/sob.    MEDICATIONS  (STANDING):  amLODIPine   Tablet 2.5 milliGRAM(s) Oral daily  atenolol  Tablet 25 milliGRAM(s) Oral daily  atorvastatin 10 milliGRAM(s) Oral at bedtime  enoxaparin Injectable 40 milliGRAM(s) SubCutaneous every 24 hours  lisinopril 10 milliGRAM(s) Oral daily  montelukast 10 milliGRAM(s) Oral daily  pantoprazole    Tablet 40 milliGRAM(s) Oral before breakfast  piperacillin/tazobactam IVPB.. 4.5 Gram(s) IV Intermittent every 8 hours    MEDICATIONS  (PRN):  ibuprofen  Tablet. 200 milliGRAM(s) Oral every 8 hours PRN Temp greater or equal to 38C (100.4F), Mild Pain (1 - 3)  LORazepam     Tablet 0.5 milliGRAM(s) Oral every 12 hours PRN Anxiety  polyethylene glycol 3350 17 Gram(s) Oral two times a day PRN Constipation  senna 2 Tablet(s) Oral at bedtime PRN Constipation      Vital Signs Last 24 Hrs  T(C): 36.8 (16 Nov 2024 21:30), Max: 37.1 (16 Nov 2024 01:57)  T(F): 98.2 (16 Nov 2024 21:30), Max: 98.8 (16 Nov 2024 01:57)  HR: 88 (16 Nov 2024 21:30) (88 - 106)  BP: 107/67 (16 Nov 2024 21:30) (100/68 - 115/73)  BP(mean): 85 (16 Nov 2024 06:08) (85 - 85)  RR: 16 (16 Nov 2024 21:30) (16 - 16)  SpO2: 98% (16 Nov 2024 21:30) (97% - 99%)    Parameters below as of 16 Nov 2024 21:30  Patient On (Oxygen Delivery Method): room air        Physical Exam:  Constitutional: A&Ox3, NAD  Respiratory: normal work of breathing  Cardiovascular: NSR, RRR  Abdomen: soft, non-tender, non distended, no rebound or gaurding  Legs: WWP, SCDs in place, no calf tenderness        I&O's Detail      LABS:                        7.7    10.74 )-----------( 395      ( 16 Nov 2024 08:29 )             24.7     11-16    135  |  101  |  21  ----------------------------<  110[H]  4.2   |  24  |  0.78    Ca    8.7      16 Nov 2024 08:29  Phos  3.4     11-16  Mg     1.9     11-16    TPro  6.3  /  Alb  3.0[L]  /  TBili  0.2  /  DBili  x   /  AST  42[H]  /  ALT  44  /  AlkPhos  242[H]  11-16      Urinalysis Basic - ( 16 Nov 2024 08:29 )    Color: x / Appearance: x / SG: x / pH: x  Gluc: 110 mg/dL / Ketone: x  / Bili: x / Urobili: x   Blood: x / Protein: x / Nitrite: x   Leuk Esterase: x / RBC: x / WBC x   Sq Epi: x / Non Sq Epi: x / Bacteria: x        RADIOLOGY & ADDITIONAL STUDIES:

## 2024-11-16 NOTE — PROGRESS NOTE ADULT - ASSESSMENT
79F PMHx 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 Arvizu), vestibular neuritis, remote ovarian cancer, HTN, HLD presenting with several weeks of fever of unknown origin and abdominal pain. Fevers thought to be likely necrosis of cholangiocarcinoma 2/2 chemotherapy.

## 2024-11-17 LAB
ALBUMIN SERPL ELPH-MCNC: 3.1 G/DL — LOW (ref 3.3–5)
ALP SERPL-CCNC: 245 U/L — HIGH (ref 40–120)
ALT FLD-CCNC: 49 U/L — HIGH (ref 10–45)
ANION GAP SERPL CALC-SCNC: 9 MMOL/L — SIGNIFICANT CHANGE UP (ref 5–17)
AST SERPL-CCNC: 42 U/L — HIGH (ref 10–40)
BASOPHILS # BLD AUTO: 0.09 K/UL — SIGNIFICANT CHANGE UP (ref 0–0.2)
BASOPHILS NFR BLD AUTO: 0.8 % — SIGNIFICANT CHANGE UP (ref 0–2)
BILIRUB SERPL-MCNC: 0.2 MG/DL — SIGNIFICANT CHANGE UP (ref 0.2–1.2)
BLD GP AB SCN SERPL QL: NEGATIVE — SIGNIFICANT CHANGE UP
BLD GP AB SCN SERPL QL: NEGATIVE — SIGNIFICANT CHANGE UP
BUN SERPL-MCNC: 18 MG/DL — SIGNIFICANT CHANGE UP (ref 7–23)
CALCIUM SERPL-MCNC: 8.9 MG/DL — SIGNIFICANT CHANGE UP (ref 8.4–10.5)
CHLORIDE SERPL-SCNC: 99 MMOL/L — SIGNIFICANT CHANGE UP (ref 96–108)
CO2 SERPL-SCNC: 24 MMOL/L — SIGNIFICANT CHANGE UP (ref 22–31)
CREAT SERPL-MCNC: 0.75 MG/DL — SIGNIFICANT CHANGE UP (ref 0.5–1.3)
CULTURE RESULTS: SIGNIFICANT CHANGE UP
CULTURE RESULTS: SIGNIFICANT CHANGE UP
EGFR: 82 ML/MIN/1.73M2 — SIGNIFICANT CHANGE UP
EOSINOPHIL # BLD AUTO: 0.04 K/UL — SIGNIFICANT CHANGE UP (ref 0–0.5)
EOSINOPHIL NFR BLD AUTO: 0.4 % — SIGNIFICANT CHANGE UP (ref 0–6)
GLUCOSE SERPL-MCNC: 113 MG/DL — HIGH (ref 70–99)
HCT VFR BLD CALC: 24.8 % — LOW (ref 34.5–45)
HCT VFR BLD CALC: 26.7 % — LOW (ref 34.5–45)
HGB BLD-MCNC: 7.6 G/DL — LOW (ref 11.5–15.5)
HGB BLD-MCNC: 8.4 G/DL — LOW (ref 11.5–15.5)
IMM GRANULOCYTES NFR BLD AUTO: 5.1 % — HIGH (ref 0–0.9)
LYMPHOCYTES # BLD AUTO: 1.11 K/UL — SIGNIFICANT CHANGE UP (ref 1–3.3)
LYMPHOCYTES # BLD AUTO: 10 % — LOW (ref 13–44)
MAGNESIUM SERPL-MCNC: 1.9 MG/DL — SIGNIFICANT CHANGE UP (ref 1.6–2.6)
MCHC RBC-ENTMCNC: 30.4 PG — SIGNIFICANT CHANGE UP (ref 27–34)
MCHC RBC-ENTMCNC: 30.5 PG — SIGNIFICANT CHANGE UP (ref 27–34)
MCHC RBC-ENTMCNC: 30.6 G/DL — LOW (ref 32–36)
MCHC RBC-ENTMCNC: 31.5 G/DL — LOW (ref 32–36)
MCV RBC AUTO: 96.7 FL — SIGNIFICANT CHANGE UP (ref 80–100)
MCV RBC AUTO: 99.6 FL — SIGNIFICANT CHANGE UP (ref 80–100)
MONOCYTES # BLD AUTO: 1.21 K/UL — HIGH (ref 0–0.9)
MONOCYTES NFR BLD AUTO: 10.9 % — SIGNIFICANT CHANGE UP (ref 2–14)
NEUTROPHILS # BLD AUTO: 8.06 K/UL — HIGH (ref 1.8–7.4)
NEUTROPHILS NFR BLD AUTO: 72.8 % — SIGNIFICANT CHANGE UP (ref 43–77)
NRBC # BLD: 0 /100 WBCS — SIGNIFICANT CHANGE UP (ref 0–0)
NRBC # BLD: 0 /100 WBCS — SIGNIFICANT CHANGE UP (ref 0–0)
PHOSPHATE SERPL-MCNC: 3.4 MG/DL — SIGNIFICANT CHANGE UP (ref 2.5–4.5)
PLATELET # BLD AUTO: 343 K/UL — SIGNIFICANT CHANGE UP (ref 150–400)
PLATELET # BLD AUTO: 387 K/UL — SIGNIFICANT CHANGE UP (ref 150–400)
POTASSIUM SERPL-MCNC: 4.5 MMOL/L — SIGNIFICANT CHANGE UP (ref 3.5–5.3)
POTASSIUM SERPL-SCNC: 4.5 MMOL/L — SIGNIFICANT CHANGE UP (ref 3.5–5.3)
PROT SERPL-MCNC: 6.6 G/DL — SIGNIFICANT CHANGE UP (ref 6–8.3)
RBC # BLD: 2.49 M/UL — LOW (ref 3.8–5.2)
RBC # BLD: 2.76 M/UL — LOW (ref 3.8–5.2)
RBC # FLD: 15.9 % — HIGH (ref 10.3–14.5)
RBC # FLD: 17.9 % — HIGH (ref 10.3–14.5)
RH IG SCN BLD-IMP: POSITIVE — SIGNIFICANT CHANGE UP
RH IG SCN BLD-IMP: POSITIVE — SIGNIFICANT CHANGE UP
SODIUM SERPL-SCNC: 132 MMOL/L — LOW (ref 135–145)
SPECIMEN SOURCE: SIGNIFICANT CHANGE UP
SPECIMEN SOURCE: SIGNIFICANT CHANGE UP
WBC # BLD: 11.08 K/UL — HIGH (ref 3.8–10.5)
WBC # BLD: 9.7 K/UL — SIGNIFICANT CHANGE UP (ref 3.8–10.5)
WBC # FLD AUTO: 11.08 K/UL — HIGH (ref 3.8–10.5)
WBC # FLD AUTO: 9.7 K/UL — SIGNIFICANT CHANGE UP (ref 3.8–10.5)

## 2024-11-17 RX ADMIN — Medication 2 TABLET(S): at 07:34

## 2024-11-17 RX ADMIN — Medication 200 MILLIGRAM(S): at 12:13

## 2024-11-17 RX ADMIN — MONTELUKAST SODIUM 10 MILLIGRAM(S): 10 TABLET ORAL at 22:03

## 2024-11-17 RX ADMIN — PIPERACILLIN SODIUM AND TAZOBACTAM SODIUM 25 GRAM(S): 4; .5 INJECTION, POWDER, LYOPHILIZED, FOR SOLUTION INTRAVENOUS at 13:41

## 2024-11-17 RX ADMIN — PIPERACILLIN SODIUM AND TAZOBACTAM SODIUM 25 GRAM(S): 4; .5 INJECTION, POWDER, LYOPHILIZED, FOR SOLUTION INTRAVENOUS at 22:04

## 2024-11-17 RX ADMIN — Medication 200 MILLIGRAM(S): at 13:15

## 2024-11-17 RX ADMIN — LISINOPRIL 10 MILLIGRAM(S): 20 TABLET ORAL at 22:04

## 2024-11-17 RX ADMIN — Medication 10 MILLIGRAM(S): at 22:04

## 2024-11-17 RX ADMIN — Medication 200 MILLIGRAM(S): at 22:04

## 2024-11-17 RX ADMIN — PANTOPRAZOLE SODIUM 40 MILLIGRAM(S): 40 TABLET, DELAYED RELEASE ORAL at 06:48

## 2024-11-17 RX ADMIN — AMLODIPINE BESYLATE 2.5 MILLIGRAM(S): 10 TABLET ORAL at 22:03

## 2024-11-17 RX ADMIN — PIPERACILLIN SODIUM AND TAZOBACTAM SODIUM 25 GRAM(S): 4; .5 INJECTION, POWDER, LYOPHILIZED, FOR SOLUTION INTRAVENOUS at 06:48

## 2024-11-17 RX ADMIN — ATENOLOL 25 MILLIGRAM(S): 100 TABLET ORAL at 06:48

## 2024-11-17 NOTE — PROGRESS NOTE ADULT - ASSESSMENT
79F PMHx unresectable intrahepatic cholangiocarcinoma on gem/cis/durvalumab s/p Y90 embolization 05 and 06/24, HTN, HLD presenting with several weeks of fever, failure to thrive and abdominal pain.    # Unresectable intrahepatic cholangiocarcinoma  s/p 5 cycles of gem/cis/durvalumab -- plan maintenance durvalumab following C6 if cleared from infectious standpoint     # Fever >2wks in an immunocompromised patient refractory to augmentin (last febrile 11/14)  -- port cultures negative so far  -- MRCP 11/14 w/ new tumor hypoenhancement   -- ID following   -- Patient started on Zosyn on 11/16/24, we are recommending a trial of at least 48h on antibiotics  -- Hemoglobin today 7.6, 9.2 on admission --> please give 1U pRBCs      Patient seen and d/w hematology/oncology attending, Dr Bernabe No and medicine team

## 2024-11-17 NOTE — PROGRESS NOTE ADULT - SUBJECTIVE AND OBJECTIVE BOX
INTERVAL HPI/OVERNIGHT EVENTS: chaz o/n    SUBJECTIVE: Patient seen and examined at bedside.   Afebrile. Feels energy is unchanged - walking wo LH/dizziness. Eating most of her breakfast wo N/V/Abd pain. States she has not had a BM yesterday - so took senna x1. Passing flatus.     OBJECTIVE:    VITAL SIGNS:  ICU Vital Signs Last 24 Hrs  T(C): 36.8 (17 Nov 2024 11:54), Max: 37.2 (17 Nov 2024 06:30)  T(F): 98.2 (17 Nov 2024 11:54), Max: 99 (17 Nov 2024 06:30)  HR: 81 (17 Nov 2024 11:54) (81 - 100)  BP: 109/72 (17 Nov 2024 11:54) (107/67 - 118/68)  BP(mean): --  ABP: --  ABP(mean): --  RR: 20 (17 Nov 2024 11:54) (16 - 20)  SpO2: 99% (17 Nov 2024 11:54) (98% - 99%)    O2 Parameters below as of 17 Nov 2024 11:54  Patient On (Oxygen Delivery Method): room air              CAPILLARY BLOOD GLUCOSE          PHYSICAL EXAM:  GEN: female in NAD on RA  HEENT: NC/AT, MMM  CV: RRR, nml S1S2, no murmurs  PULM: nml effort, CTAB  ABD: Soft, non-distended, There is some firmness in RUQ present. NABS, non-tender  NEURO  A/O x3, moving all extremities, Sensation intact  PSYCH: Appropriate      MEDICATIONS:  MEDICATIONS  (STANDING):  amLODIPine   Tablet 2.5 milliGRAM(s) Oral daily  atenolol  Tablet 25 milliGRAM(s) Oral daily  atorvastatin 10 milliGRAM(s) Oral at bedtime  enoxaparin Injectable 40 milliGRAM(s) SubCutaneous every 24 hours  lisinopril 10 milliGRAM(s) Oral daily  montelukast 10 milliGRAM(s) Oral daily  pantoprazole    Tablet 40 milliGRAM(s) Oral before breakfast  piperacillin/tazobactam IVPB.. 4.5 Gram(s) IV Intermittent every 8 hours    MEDICATIONS  (PRN):  ibuprofen  Tablet. 200 milliGRAM(s) Oral every 8 hours PRN Temp greater or equal to 38C (100.4F), Mild Pain (1 - 3)  LORazepam     Tablet 0.5 milliGRAM(s) Oral every 12 hours PRN Anxiety  polyethylene glycol 3350 17 Gram(s) Oral two times a day PRN Constipation  senna 2 Tablet(s) Oral at bedtime PRN Constipation      ALLERGIES:  Allergies    narcotic analgesics (Other)    Intolerances        LABS:                        7.6    11.08 )-----------( 387      ( 17 Nov 2024 08:40 )             24.8     11-17    132[L]  |  99  |  18  ----------------------------<  113[H]  4.5   |  24  |  0.75    Ca    8.9      17 Nov 2024 08:40  Phos  3.4     11-17  Mg     1.9     11-17    TPro  6.6  /  Alb  3.1[L]  /  TBili  0.2  /  DBili  x   /  AST  42[H]  /  ALT  49[H]  /  AlkPhos  245[H]  11-17      Urinalysis Basic - ( 17 Nov 2024 08:40 )    Color: x / Appearance: x / SG: x / pH: x  Gluc: 113 mg/dL / Ketone: x  / Bili: x / Urobili: x   Blood: x / Protein: x / Nitrite: x   Leuk Esterase: x / RBC: x / WBC x   Sq Epi: x / Non Sq Epi: x / Bacteria: x        RADIOLOGY & ADDITIONAL TESTS: Reviewed.

## 2024-11-17 NOTE — PROGRESS NOTE ADULT - ASSESSMENT
79F w cholangiocarcinoma s/p chemoembolization (follows w Dr. Arvizu) w intrahepatic lesion in L lobe, recently on cycle 4 10/18/24 of gemcitabine/cisplatin/durvalumab, vestibular neuritis, HTN, HLD, ovarian CA s/p LUIS-BSO, p/w recent fevers, s/p 7d PO augmentin outpatient after being seen in the ED, now w ongoing fevers, poor appetite and intake, increased weakness, admitted for evaluation of persistent fevers, BCx, Chemoport Cx NGTD, Evaluated by ID - low suspicion for infection, MRI liver negative for acute change in lesion - started on IV zosyn 11/16 per heme-onc recs,     #Fever. Has been afebrile since 11/14 AM. ; Procalcitonin 0.2. UA negative for bacteruria. RVP negative.   #Cholangiocarcinoma -MRI liver wo acute change in lesion. Intrahepatic ductal dilation present.     #Normocytic anemia - 7.6 today from 7.7 Was 9.2 on admission Asymptomatic. Does not appear to have acute blood loss. Possibly AoCD i/s/o malignancy.  #Hyponatremia - 132 today. Mild. Monitor clinically  #HTN - amlodipine 2.5mg daily, atenolol 25mg daily, lisinopril 10mg daily  #HLD - c/w home statin  #Asthma - not in acute exacerbation. On singulair    PPx: SQL    Plan  Continue IV Zosyn - Monitor for diarrhea. All bowel regimen is PRN.   Pt counseled to monitor for diarrhea. Continue to monitor for any fevers or 3 or more BM daily.  Iron studies, Retic count, Type and screen. CBC w Diff in AM. No acute signs of blood loss. Suspect AoCD i/s/o malignancy.     Above d/w housestaff  DISPO: Home w HPT - possible DC 11/18 pending heme-onc recs and Hgb 79F w cholangiocarcinoma s/p chemoembolization (follows w Dr. Arvizu) w intrahepatic lesion in L lobe, recently on cycle 4 10/18/24 of gemcitabine/cisplatin/durvalumab, vestibular neuritis, HTN, HLD, ovarian CA s/p LUIS-BSO, p/w recent fevers, s/p 7d PO augmentin outpatient after being seen in the ED, now w ongoing fevers, poor appetite and intake, increased weakness, admitted for evaluation of persistent fevers, BCx, Chemoport Cx NGTD, Evaluated by ID - low suspicion for infection, MRI liver negative for acute change in lesion - started on IV zosyn 11/16 per heme-onc recs,     #Fever. Has been afebrile since 11/14 AM. ; Procalcitonin 0.2. UA negative for bacteruria. RVP negative.   #Cholangiocarcinoma -MRI liver wo acute change in lesion. Intrahepatic ductal dilation present.     #Normocytic anemia - 7.6 today from 7.7 Was 9.2 on admission Asymptomatic. Does not appear to have acute blood loss. Possibly AoCD i/s/o malignancy.  #Hyponatremia - 132 today. Mild. Monitor clinically  #HTN - amlodipine 2.5mg daily, atenolol 25mg daily, lisinopril 10mg daily  #HLD - c/w home statin  #Asthma - not in acute exacerbation. On singulair    PPx: SQL    Plan  Continue IV Zosyn - Monitor for diarrhea. All bowel regimen is PRN.   Pt counseled to monitor for diarrhea. Continue to monitor for any fevers or 3 or more BM daily.  Iron studies, Retic count, Type and screen. CBC w Diff in AM. No acute signs of blood loss. Suspect AoCD i/s/o malignancy.   Oncology-Psychiatry c/s -- requesting Dr. Zoraida Oakes to see 11/18    Above d/w housestaff  DISPO: Home w HPT - possible DC 11/18 pending heme-onc recs and Hgb

## 2024-11-17 NOTE — PROGRESS NOTE ADULT - SUBJECTIVE AND OBJECTIVE BOX
INTERVAL HPI/OVERNIGHT EVENTS: JL    SUBJECTIVE: Patient seen and examined at bedside.    VITAL SIGNS:  ICU Vital Signs Last 24 Hrs  T(C): 36.8 (17 Nov 2024 11:54), Max: 37.2 (17 Nov 2024 06:30)  T(F): 98.2 (17 Nov 2024 11:54), Max: 99 (17 Nov 2024 06:30)  HR: 81 (17 Nov 2024 11:54) (81 - 100)  BP: 109/72 (17 Nov 2024 11:54) (107/67 - 118/68)  BP(mean): --  ABP: --  ABP(mean): --  RR: 20 (17 Nov 2024 11:54) (16 - 20)  SpO2: 99% (17 Nov 2024 11:54) (98% - 99%)    O2 Parameters below as of 17 Nov 2024 11:54  Patient On (Oxygen Delivery Method): room air              CAPILLARY BLOOD GLUCOSE          PHYSICAL EXAM:    Constitutional: NAD, lying in bed  HEENT: NC/AT; PERRL, anicteric sclera; MMM  Cardiovascular: RRR  Respiratory: comfortable on room air  Extremities: no LE edema  Dermatologic: no visible rashes  Neurological: AAOx3, nonfocal    MEDICATIONS:  MEDICATIONS  (STANDING):  amLODIPine   Tablet 2.5 milliGRAM(s) Oral daily  atenolol  Tablet 25 milliGRAM(s) Oral daily  atorvastatin 10 milliGRAM(s) Oral at bedtime  enoxaparin Injectable 40 milliGRAM(s) SubCutaneous every 24 hours  lisinopril 10 milliGRAM(s) Oral daily  montelukast 10 milliGRAM(s) Oral daily  pantoprazole    Tablet 40 milliGRAM(s) Oral before breakfast  piperacillin/tazobactam IVPB.. 4.5 Gram(s) IV Intermittent every 8 hours    MEDICATIONS  (PRN):  ibuprofen  Tablet. 200 milliGRAM(s) Oral every 8 hours PRN Temp greater or equal to 38C (100.4F), Mild Pain (1 - 3)  LORazepam     Tablet 0.5 milliGRAM(s) Oral every 12 hours PRN Anxiety  polyethylene glycol 3350 17 Gram(s) Oral two times a day PRN Constipation  senna 2 Tablet(s) Oral at bedtime PRN Constipation      ALLERGIES:  Allergies    narcotic analgesics (Other)    Intolerances        LABS:                        7.6    11.08 )-----------( 387      ( 17 Nov 2024 08:40 )             24.8     11-17    132[L]  |  99  |  18  ----------------------------<  113[H]  4.5   |  24  |  0.75    Ca    8.9      17 Nov 2024 08:40  Phos  3.4     11-17  Mg     1.9     11-17    TPro  6.6  /  Alb  3.1[L]  /  TBili  0.2  /  DBili  x   /  AST  42[H]  /  ALT  49[H]  /  AlkPhos  245[H]  11-17      Urinalysis Basic - ( 17 Nov 2024 08:40 )    Color: x / Appearance: x / SG: x / pH: x  Gluc: 113 mg/dL / Ketone: x  / Bili: x / Urobili: x   Blood: x / Protein: x / Nitrite: x   Leuk Esterase: x / RBC: x / WBC x   Sq Epi: x / Non Sq Epi: x / Bacteria: x        RADIOLOGY & ADDITIONAL TESTS: Reviewed.

## 2024-11-18 LAB
ANION GAP SERPL CALC-SCNC: 8 MMOL/L — SIGNIFICANT CHANGE UP (ref 5–17)
BASOPHILS # BLD AUTO: 0.1 K/UL — SIGNIFICANT CHANGE UP (ref 0–0.2)
BASOPHILS NFR BLD AUTO: 1.1 % — SIGNIFICANT CHANGE UP (ref 0–2)
BUN SERPL-MCNC: 19 MG/DL — SIGNIFICANT CHANGE UP (ref 7–23)
CALCIUM SERPL-MCNC: 8.9 MG/DL — SIGNIFICANT CHANGE UP (ref 8.4–10.5)
CHLORIDE SERPL-SCNC: 104 MMOL/L — SIGNIFICANT CHANGE UP (ref 96–108)
CO2 SERPL-SCNC: 25 MMOL/L — SIGNIFICANT CHANGE UP (ref 22–31)
CREAT SERPL-MCNC: 0.77 MG/DL — SIGNIFICANT CHANGE UP (ref 0.5–1.3)
CULTURE RESULTS: SIGNIFICANT CHANGE UP
CULTURE RESULTS: SIGNIFICANT CHANGE UP
EGFR: 79 ML/MIN/1.73M2 — SIGNIFICANT CHANGE UP
EOSINOPHIL # BLD AUTO: 0.07 K/UL — SIGNIFICANT CHANGE UP (ref 0–0.5)
EOSINOPHIL NFR BLD AUTO: 0.8 % — SIGNIFICANT CHANGE UP (ref 0–6)
FERRITIN SERPL-MCNC: 985 NG/ML — HIGH (ref 13–330)
GAMMA INTERFERON BACKGROUND BLD IA-ACNC: 0.04 IU/ML — SIGNIFICANT CHANGE UP
GLUCOSE SERPL-MCNC: 109 MG/DL — HIGH (ref 70–99)
HCT VFR BLD CALC: 28.7 % — LOW (ref 34.5–45)
HGB BLD-MCNC: 8.8 G/DL — LOW (ref 11.5–15.5)
IMM GRANULOCYTES NFR BLD AUTO: 6.7 % — HIGH (ref 0–0.9)
IRON SATN MFR SERPL: 18 % — SIGNIFICANT CHANGE UP (ref 14–50)
IRON SATN MFR SERPL: 29 UG/DL — LOW (ref 30–160)
LYMPHOCYTES # BLD AUTO: 0.86 K/UL — LOW (ref 1–3.3)
LYMPHOCYTES # BLD AUTO: 9.4 % — LOW (ref 13–44)
M TB IFN-G BLD-IMP: ABNORMAL
M TB IFN-G CD4+ BCKGRND COR BLD-ACNC: 0 IU/ML — SIGNIFICANT CHANGE UP
M TB IFN-G CD4+CD8+ BCKGRND COR BLD-ACNC: 0 IU/ML — SIGNIFICANT CHANGE UP
MAGNESIUM SERPL-MCNC: 1.9 MG/DL — SIGNIFICANT CHANGE UP (ref 1.6–2.6)
MCHC RBC-ENTMCNC: 29.8 PG — SIGNIFICANT CHANGE UP (ref 27–34)
MCHC RBC-ENTMCNC: 30.7 G/DL — LOW (ref 32–36)
MCV RBC AUTO: 97.3 FL — SIGNIFICANT CHANGE UP (ref 80–100)
MONOCYTES # BLD AUTO: 1.09 K/UL — HIGH (ref 0–0.9)
MONOCYTES NFR BLD AUTO: 11.9 % — SIGNIFICANT CHANGE UP (ref 2–14)
NEUTROPHILS # BLD AUTO: 6.42 K/UL — SIGNIFICANT CHANGE UP (ref 1.8–7.4)
NEUTROPHILS NFR BLD AUTO: 70.1 % — SIGNIFICANT CHANGE UP (ref 43–77)
NRBC # BLD: 0 /100 WBCS — SIGNIFICANT CHANGE UP (ref 0–0)
PHOSPHATE SERPL-MCNC: 3.8 MG/DL — SIGNIFICANT CHANGE UP (ref 2.5–4.5)
PLATELET # BLD AUTO: 338 K/UL — SIGNIFICANT CHANGE UP (ref 150–400)
POTASSIUM SERPL-MCNC: 4.6 MMOL/L — SIGNIFICANT CHANGE UP (ref 3.5–5.3)
POTASSIUM SERPL-SCNC: 4.6 MMOL/L — SIGNIFICANT CHANGE UP (ref 3.5–5.3)
QUANT TB PLUS MITOGEN MINUS NIL: 0.05 IU/ML — SIGNIFICANT CHANGE UP
RBC # BLD: 2.95 M/UL — LOW (ref 3.8–5.2)
RBC # BLD: 2.95 M/UL — LOW (ref 3.8–5.2)
RBC # FLD: 18.5 % — HIGH (ref 10.3–14.5)
RETICS #: 75.8 K/UL — SIGNIFICANT CHANGE UP (ref 25–125)
RETICS/RBC NFR: 2.6 % — HIGH (ref 0.5–2.5)
SODIUM SERPL-SCNC: 137 MMOL/L — SIGNIFICANT CHANGE UP (ref 135–145)
SPECIMEN SOURCE: SIGNIFICANT CHANGE UP
SPECIMEN SOURCE: SIGNIFICANT CHANGE UP
TIBC SERPL-MCNC: 157 UG/DL — LOW (ref 220–430)
TRANSFERRIN SERPL-MCNC: 139 MG/DL — LOW (ref 200–360)
UIBC SERPL-MCNC: 128 UG/DL — SIGNIFICANT CHANGE UP (ref 110–370)
WBC # BLD: 9.15 K/UL — SIGNIFICANT CHANGE UP (ref 3.8–10.5)
WBC # FLD AUTO: 9.15 K/UL — SIGNIFICANT CHANGE UP (ref 3.8–10.5)

## 2024-11-18 PROCEDURE — 99223 1ST HOSP IP/OBS HIGH 75: CPT

## 2024-11-18 PROCEDURE — 99233 SBSQ HOSP IP/OBS HIGH 50: CPT

## 2024-11-18 RX ORDER — CEFPODOXIME PROXETIL 100 MG/5ML
1 GRANULE, FOR SUSPENSION ORAL
Qty: 4 | Refills: 0
Start: 2024-11-18 | End: 2024-11-19

## 2024-11-18 RX ORDER — METRONIDAZOLE 500 MG/1
1 TABLET ORAL
Qty: 6 | Refills: 0
Start: 2024-11-18 | End: 2024-11-19

## 2024-11-18 RX ADMIN — MONTELUKAST SODIUM 10 MILLIGRAM(S): 10 TABLET ORAL at 21:31

## 2024-11-18 RX ADMIN — Medication 200 MILLIGRAM(S): at 10:30

## 2024-11-18 RX ADMIN — Medication 200 MILLIGRAM(S): at 22:24

## 2024-11-18 RX ADMIN — Medication 200 MILLIGRAM(S): at 12:37

## 2024-11-18 RX ADMIN — PANTOPRAZOLE SODIUM 40 MILLIGRAM(S): 40 TABLET, DELAYED RELEASE ORAL at 06:11

## 2024-11-18 RX ADMIN — ATENOLOL 25 MILLIGRAM(S): 100 TABLET ORAL at 06:13

## 2024-11-18 RX ADMIN — PIPERACILLIN SODIUM AND TAZOBACTAM SODIUM 25 GRAM(S): 4; .5 INJECTION, POWDER, LYOPHILIZED, FOR SOLUTION INTRAVENOUS at 22:12

## 2024-11-18 RX ADMIN — LISINOPRIL 10 MILLIGRAM(S): 20 TABLET ORAL at 21:31

## 2024-11-18 RX ADMIN — AMLODIPINE BESYLATE 2.5 MILLIGRAM(S): 10 TABLET ORAL at 21:31

## 2024-11-18 RX ADMIN — PIPERACILLIN SODIUM AND TAZOBACTAM SODIUM 25 GRAM(S): 4; .5 INJECTION, POWDER, LYOPHILIZED, FOR SOLUTION INTRAVENOUS at 14:05

## 2024-11-18 RX ADMIN — PIPERACILLIN SODIUM AND TAZOBACTAM SODIUM 25 GRAM(S): 4; .5 INJECTION, POWDER, LYOPHILIZED, FOR SOLUTION INTRAVENOUS at 06:11

## 2024-11-18 RX ADMIN — Medication 200 MILLIGRAM(S): at 21:31

## 2024-11-18 RX ADMIN — Medication 10 MILLIGRAM(S): at 21:31

## 2024-11-18 NOTE — PROGRESS NOTE ADULT - PROBLEM SELECTOR PLAN 2
Active malignancy, following Dr. Arvizu, seen most recently several days ago, aware of ongoing fevers. Pt was due for 6 cycles of gemcitabine/cisplatin/durvalumab, however only completed 4 out of 6.  S/p embolization in May with IR and on active chemo, last chem was end of October next dose planned for later this month pending fever w/u.  Per pt, she is also to follow up with a surgeon Dr. Loredo regarding possible surgical resection.    Plan  - f/u heme/onc recs  - f/u surgery recs Active malignancy, following Dr. Arvizu, seen most recently several days ago, aware of ongoing fevers. Pt was due for 6 cycles of gemcitabine/cisplatin/durvalumab, however only completed 4 out of 6.  S/p embolization in May with IR and on active chemo, last chem was end of October next dose planned for later this month pending fever w/u.  Per pt, she is also to follow up with a surgeon Dr. Loredo regarding possible surgical resection.    Plan  - per surgery rec, no acute surgical intervention at this time  - pt consulted by onc/psych; will f/u outpatient in 2 wks with Dr. Zoraida Rooney  - f/u heme/onc recs

## 2024-11-18 NOTE — PROGRESS NOTE ADULT - ASSESSMENT
79F recent dx of unresectable intrahepatic cholangiocarcinoma (dx April 2024), s/p y90 embolization (05 and 06/24) x2, s/p 5 cycles of gem/cis/durvalumab, admitted to medicine service for fevers of unknown origin, infectious workup negative thus far. No acute surgical intervention at this time, further surgical planning/consideration pending continued work up.    Recs:  - Discussion between attending surgeon and patient ongoing regarding potential surgical intervention      Team 1C will continue to follow, please reach out with any questions/concerns

## 2024-11-18 NOTE — BH CONSULTATION LIAISON ASSESSMENT NOTE - NSBHCHARTREVIEWVS_PSY_A_CORE FT
Vital Signs Last 24 Hrs  T(C): 36.8 (18 Nov 2024 12:08), Max: 36.8 (17 Nov 2024 20:37)  T(F): 98.3 (18 Nov 2024 12:08), Max: 98.3 (18 Nov 2024 03:27)  HR: 93 (18 Nov 2024 12:08) (84 - 93)  BP: 131/88 (18 Nov 2024 12:08) (101/61 - 131/88)  BP(mean): --  RR: 17 (18 Nov 2024 12:08) (17 - 20)  SpO2: 98% (18 Nov 2024 12:08) (97% - 99%)    Parameters below as of 18 Nov 2024 12:08  Patient On (Oxygen Delivery Method): room air

## 2024-11-18 NOTE — PROGRESS NOTE ADULT - SUBJECTIVE AND OBJECTIVE BOX
SUBJECTIVE: Pt seen and examined at bedside this am by surgery team. Patient resting comfortably in bed. Tolerating diet and ambulating in halls. Denies any nausea or vomiting. Has full bowel function. Reports waking up in the middle of the night in sweat but vitals has been wnl.     MEDICATIONS  (STANDING):  amLODIPine   Tablet 2.5 milliGRAM(s) Oral daily  atenolol  Tablet 25 milliGRAM(s) Oral daily  atorvastatin 10 milliGRAM(s) Oral at bedtime  enoxaparin Injectable 40 milliGRAM(s) SubCutaneous every 24 hours  lisinopril 10 milliGRAM(s) Oral daily  montelukast 10 milliGRAM(s) Oral daily  pantoprazole    Tablet 40 milliGRAM(s) Oral before breakfast  piperacillin/tazobactam IVPB.. 4.5 Gram(s) IV Intermittent every 8 hours    MEDICATIONS  (PRN):  ibuprofen  Tablet. 200 milliGRAM(s) Oral every 8 hours PRN Temp greater or equal to 38C (100.4F), Mild Pain (1 - 3)  LORazepam     Tablet 0.5 milliGRAM(s) Oral every 12 hours PRN Anxiety  polyethylene glycol 3350 17 Gram(s) Oral two times a day PRN Constipation  senna 2 Tablet(s) Oral at bedtime PRN Constipation      Vital Signs Last 24 Hrs  T(C): 36.8 (18 Nov 2024 12:08), Max: 36.8 (17 Nov 2024 20:37)  T(F): 98.3 (18 Nov 2024 12:08), Max: 98.3 (18 Nov 2024 03:27)  HR: 93 (18 Nov 2024 12:08) (84 - 93)  BP: 131/88 (18 Nov 2024 12:08) (101/61 - 131/88)  BP(mean): --  RR: 17 (18 Nov 2024 12:08) (17 - 20)  SpO2: 98% (18 Nov 2024 12:08) (97% - 99%)    Parameters below as of 18 Nov 2024 12:08  Patient On (Oxygen Delivery Method): room air        PHYSICAL EXAM:    Constitutional: A&Ox3, nad  Respiratory: non labored breathing, no respiratory distress  Cardiovascular: NSR, RRR  Gastrointestinal: abd soft, NT, ND  Genitourinary: voiding  Extremities: (-) edema, wwp, SCDs in place          I&O's Detail      LABS:                        8.8    9.15  )-----------( 338      ( 18 Nov 2024 05:30 )             28.7     11-18    137  |  104  |  19  ----------------------------<  109[H]  4.6   |  25  |  0.77    Ca    8.9      18 Nov 2024 05:30  Phos  3.8     11-18  Mg     1.9     11-18    TPro  6.6  /  Alb  3.1[L]  /  TBili  0.2  /  DBili  x   /  AST  42[H]  /  ALT  49[H]  /  AlkPhos  245[H]  11-17      Urinalysis Basic - ( 18 Nov 2024 05:30 )    Color: x / Appearance: x / SG: x / pH: x  Gluc: 109 mg/dL / Ketone: x  / Bili: x / Urobili: x   Blood: x / Protein: x / Nitrite: x   Leuk Esterase: x / RBC: x / WBC x   Sq Epi: x / Non Sq Epi: x / Bacteria: x        RADIOLOGY & ADDITIONAL STUDIES:

## 2024-11-18 NOTE — PROGRESS NOTE ADULT - SUBJECTIVE AND OBJECTIVE BOX
patient seen and examined at bedside, remains stable on zosyn  now s/p 1U PRBC yesterday, HgB 8.8 today  patient reports overall feeling better compared to yesterday  plan for discharge home tomorrow on oral antibiotics & w/ oncology follow up       ANTIBIOTICS/RELEVANT:    MEDICATIONS  (STANDING):  amLODIPine   Tablet 2.5 milliGRAM(s) Oral daily  atenolol  Tablet 25 milliGRAM(s) Oral daily  atorvastatin 10 milliGRAM(s) Oral at bedtime  enoxaparin Injectable 40 milliGRAM(s) SubCutaneous every 24 hours  lisinopril 10 milliGRAM(s) Oral daily  montelukast 10 milliGRAM(s) Oral daily  pantoprazole    Tablet 40 milliGRAM(s) Oral before breakfast  piperacillin/tazobactam IVPB.. 4.5 Gram(s) IV Intermittent every 8 hours    MEDICATIONS  (PRN):  ibuprofen  Tablet. 200 milliGRAM(s) Oral every 8 hours PRN Temp greater or equal to 38C (100.4F), Mild Pain (1 - 3)  LORazepam     Tablet 0.5 milliGRAM(s) Oral every 12 hours PRN Anxiety  polyethylene glycol 3350 17 Gram(s) Oral two times a day PRN Constipation  senna 2 Tablet(s) Oral at bedtime PRN Constipation      Vital Signs Last 24 Hrs  T(C): 36.8 (18 Nov 2024 12:08), Max: 36.8 (17 Nov 2024 20:37)  T(F): 98.3 (18 Nov 2024 12:08), Max: 98.3 (18 Nov 2024 03:27)  HR: 93 (18 Nov 2024 12:08) (84 - 93)  BP: 131/88 (18 Nov 2024 12:08) (101/61 - 131/88)  BP(mean): --  RR: 17 (18 Nov 2024 12:08) (17 - 20)  SpO2: 98% (18 Nov 2024 12:08) (97% - 99%)    Parameters below as of 18 Nov 2024 12:08  Patient On (Oxygen Delivery Method): room air        PHYSICAL EXAM:  General: in no acute distress  Eyes: EOMI intact bilaterally. Anicteric sclerae, moist conjunctivae  HENT: Moist mucous membranes  Neck: Trachea midline, supple  Lungs: CTA B/L. No wheezes, rales, or rhonchi  Cardiovascular: RRR. No murmurs, rubs, or gallops  Abdomen: Soft, non-tender non-distended; No rebound or guarding  Extremities: WWP, No clubbing, cyanosis or edema  Neurological: Alert and oriented  Skin: Warm and dry. No obvious rash     LABS:                        8.8    9.15  )-----------( 338      ( 18 Nov 2024 05:30 )             28.7     11-18    137  |  104  |  19  ----------------------------<  109[H]  4.6   |  25  |  0.77    Ca    8.9      18 Nov 2024 05:30  Phos  3.8     11-18  Mg     1.9     11-18    TPro  6.6  /  Alb  3.1[L]  /  TBili  0.2  /  DBili  x   /  AST  42[H]  /  ALT  49[H]  /  AlkPhos  245[H]  11-17      Urinalysis Basic - ( 18 Nov 2024 05:30 )    Color: x / Appearance: x / SG: x / pH: x  Gluc: 109 mg/dL / Ketone: x  / Bili: x / Urobili: x   Blood: x / Protein: x / Nitrite: x   Leuk Esterase: x / RBC: x / WBC x   Sq Epi: x / Non Sq Epi: x / Bacteria: x        MICROBIOLOGY:    RADIOLOGY & ADDITIONAL STUDIES:

## 2024-11-18 NOTE — PROGRESS NOTE ADULT - NS ATTEST RISK PROBLEM GEN_ALL_CORE FT
Fever in context of recent chemotherapy requiring lab monitoring, IV antibiotics and daily followup
Fever in context of recent chemotherapy requiring lab monitoring, IV antibiotics and daily followup

## 2024-11-18 NOTE — BH CONSULTATION LIAISON ASSESSMENT NOTE - CURRENT MEDICATION
Normal Pap - Please send normal pap letter
MEDICATIONS  (STANDING):  amLODIPine   Tablet 2.5 milliGRAM(s) Oral daily  atenolol  Tablet 25 milliGRAM(s) Oral daily  atorvastatin 10 milliGRAM(s) Oral at bedtime  enoxaparin Injectable 40 milliGRAM(s) SubCutaneous every 24 hours  lisinopril 10 milliGRAM(s) Oral daily  montelukast 10 milliGRAM(s) Oral daily  pantoprazole    Tablet 40 milliGRAM(s) Oral before breakfast  piperacillin/tazobactam IVPB.. 4.5 Gram(s) IV Intermittent every 8 hours    MEDICATIONS  (PRN):  ibuprofen  Tablet. 200 milliGRAM(s) Oral every 8 hours PRN Temp greater or equal to 38C (100.4F), Mild Pain (1 - 3)  LORazepam     Tablet 0.5 milliGRAM(s) Oral every 12 hours PRN Anxiety  polyethylene glycol 3350 17 Gram(s) Oral two times a day PRN Constipation  senna 2 Tablet(s) Oral at bedtime PRN Constipation

## 2024-11-18 NOTE — PROGRESS NOTE ADULT - ASSESSMENT
79F PMHx unresectable intrahepatic cholangiocarcinoma on gem/cis/durvalumab s/p Y90 embolization 05 and 06/24, HTN, HLD presenting with several weeks of fever, failure to thrive and abdominal pain.    # Unresectable intrahepatic cholangiocarcinoma  s/p 5 cycles of gem/cis/durvalumab -- plan maintenance durvalumab following C6 if cleared from infectious standpoint     # Fever >2wks in an immunocompromised patient refractory to augmentin (last febrile 11/14)  -- port cultures negative so far  -- MRCP 11/14 w/ new tumor hypoenhancement , continue zoysn, transition to oral antibiotics for discharge   -- ID following   -- will arrange for oncology follow up with week with Dr. Arvizu     Patient seen and d/w hematology/oncology attending, Dr Bernabe No

## 2024-11-18 NOTE — PROGRESS NOTE ADULT - PROBLEM SELECTOR PROBLEM 1
Fever of unknown origin

## 2024-11-18 NOTE — PROGRESS NOTE ADULT - ATTENDING COMMENTS
Afebrile, WBC normalized to 10.2 without intervention.  Patient feels the same, intermittent abdominal discomfort.  She walked around the hallway today.  Abdomen non-tender.  MRI abdomen reviewed - no e/o infection.  Suspect fever due to tumor.  Monitor off abx.      Thank you for your consult.  Please re-consult us or call us with questions.  Case d/w primary team.    Jeanie Davidson MD, MS  Infectious Disease attending  office phone [phone]  For any questions during evening/weekend/holiday, please page ID on call
Agree with fellow note as documented above.  79 year old female with recent history cholangiocarcinoma on TOPAZ-1 regimen, with local therapy in the form of y90 x 2, most recently 6/24, presenting with ongoing fevers and abdominal pain. CT imaging during prior presentation unremarkable, mild abdominal TTP which is acute on chronic. Abdomen is hard and rigid with clear liver edge. LFTs unremarkable, lowers suspicion of intra-hepatic process. However, given refractory fevers should obtain dedicated hepatic imaging such as RUQ u/s to ensure no abscess formation. Should receive advanced biliary imaging as fevers are ongoing to > 100.3 while inpatient. ID consultation appreciated. No role for line removal at this time,  NGTD on peripheral or central cultures.   Must rule out abscess vs other intra-hepatic process. Tumor fever is a diagnosis of exclusion.   Afebrile with 48 hr Zosyn empiric trial.   Blood transfusion Sunday.  Feeling clinically improved, although agitated that antibiotic rationale was not fully explained.   Spent significant time discussing rationale for antibiotics, hope that this may clear a occult infection and that it may demonstrate that her underlying disease is not causative etiology.     Bernabe No
Febrile to 100.6 overnight.  Patient feels well, denied any new symptoms.  Abdomen non-tender.  Lab notable for WBC 12.39. BCx ngtd.  Fever of unclear etiology, suspect tumor fever in setting of treatment response (necrotic tumor?).  No e/o liver abscess and so far infectious work-up unremarkable.  Monitor off abx.  Obtain MRI liver.  f/u BCx.  If she spikes >101 fever, or leukocytosis continues to worsen, or feels unwell, or any clinical status change, then start ceftriaxone 1g IV q24h and flagyl 500mg PO q12h.      Team 1 will follow you.  Case d/w primary team.    Jeanie Davidson MD, MS  Infectious Disease attending  office phone [phone]  For any questions during evening/weekend/holiday, please page ID on call.
Agree with fellow note as documented above.  79 year old female with recent history cholangiocarcinoma on TOPAZ-1 regimen, with local therapy in the form of y90 x 2, most recently 6/24, presenting with ongoing fevers and abdominal pain. CT imaging during prior presentation unremarkable, mild abdominal TTP which is acute on chronic. Abdomen is hard and rigid with clear liver edge. LFTs unremarkable, lowers suspicion of intra-hepatic process. However, given refractory fevers should obtain dedicated hepatic imaging such as RUQ u/s to ensure no abscess formation. Should receive advanced biliary imaging as fevers are ongoing to > 100.3 while inpatient. ID consultation appreciated. No role for line removal at this time,  NGTD on peripheral or central cultures.   Must rule out abscess vs other intra-hepatic process. Tumor fever is a diagnosis of exclusion.     Greatly appreciate empiric trial of 48 hr Zosyn; if no clinical improvement or continued fevers will cede that this is tumor fever. However, necessary to rule out reversible causes prior.     Bernabe No
Agree with fellow note as documented above.  79 year old female with recent history cholangiocarcinoma on TOPAZ-1 regimen, with local therapy in the form of y90 x 2, most recently 6/24, presenting with ongoing fevers and abdominal pain. CT imaging during prior presentation unremarkable, mild abdominal TTP which is acute on chronic. Abdomen is hard and rigid with clear liver edge. LFTs unremarkable, lowers suspicion of intra-hepatic process. However, given refractory fevers should obtain dedicated hepatic imaging such as RUQ u/s to ensure no abscess formation. Should receive advanced biliary imaging as fevers are ongoing to > 100.3 while inpatient. ID consultation appreciated. No role for line removal at this time, but may consider following ID consult. NGTD on peripheral or central cultures.   Must rule out abscess vs other intra-hepatic process. Tumor fever is a diagnosis of exclusion.     Bernabe No
79F w cholangiocarcinoma s/p chemoembolization (follows w Dr. Arvizu) w intrahepatic lesion in L lobe, recently on cycle 4 10/18/24 of gemcitabine/cisplatin/durvalumab, vestibular neuritis, HTN, HLD, ovarian CA s/p LUIS-BSO, p/w recent fevers, s/p 7d PO augmentin outpatient after being seen in the ED, now w ongoing fevers, poor appetite and intake, increased weakness, admitted for evaluation of persistent fevers.    Afebrile overnight. Pt feels appetite is improving. Still says her taste has been off. States abd pain is about baseline. Walking wo LH/dizziness.   Exam: female in NAD on RA, MMM no thrush, RRR, nml resp effort, CTAB, abd firm wo tenderness, NABS, alert, moving all ext. 5/5 in BLE.     #Fever. ; Procalcitonin 0.2. UA negative for bacteruria. RVP negative.   #Cholangiocarcinoma    #Normocytic anemia - 8.5 from 7.4 from 8 from 9.2. Asymptomatic. Does not appear to have acute blood loss. Possibly AoCD i/s/o malignancy.  #Hyponatremia - 132 today. Mild Encourage PO/fluid intake  #HTN - amlodipine 2.5mg daily, atenolol 25mg daily, lisinopril 10mg daily  #HLD - c/w home statin  #Asthma - not in acute exacerbation. On singulair    PPx: SQL    Plan  MRI liver wo acute change in lesion. Intrahepatic ductal dilation present.   Noted ID recs - overall low suspicion for infection - BCx and port cultures NGTD and pt has been afebrile since yesterday morning.  Noted Heme-Onc impression there may still be possible infection. Agree would thus trial IV Zosyn and monitor for improvement in overall energy and malaise. This was discussed with the patient and side effects including C diff infection was explained.     Above d/w housestaff, ID - Dr. Davidson, Onc- Dr. No, Surg-Onc - Dr Loredo
79F w cholangiocarcinoma s/p chemoembolization (follows w Dr. Arvizu) w intrahepatic lesion in L lobe, recently on cycle 4 10/18/24 of gemcitabine/cisplatin/durvalumab, vestibular neuritis, HTN, HLD, ovarian CA s/p LUIS-BSO, p/w recent fevers, s/p 7d PO augmentin outpatient after being seen in the ED, now w ongoing fevers, poor appetite and intake, increased weakness, admitted for evaluation of persistent fevers.    Afebrile overnight. Pt feels energy and abdominal pain is slolwy improving. Eating most of her breakfast today - no N/V/abd pain. She declined doses of zosyn yesterday evening. After discussion w her family - she feels she is ready to take zosyn. We discussed potential risks and monitoring for diarrhea.   She is hoping with clinical improvement she may be a surgical candidate.     Exam: female in NAD on RA, MMM no thrush, RRR, nml resp effort, CTAB, abd firm wo tenderness, NABS, alert, moving all ext. 5/5 in BLE.     #Fever. Has been afebrile since 11/14 AM. ; Procalcitonin 0.2. UA negative for bacteruria. RVP negative.   #Cholangiocarcinoma -MRI liver wo acute change in lesion. Intrahepatic ductal dilation present.     #Normocytic anemia - 7.7 from 8.5 from 7.4 from 8 from 9.2. Asymptomatic. Does not appear to have acute blood loss. Possibly AoCD i/s/o malignancy.  #Hyponatremia - resolved. 135 today.   #HTN - amlodipine 2.5mg daily, atenolol 25mg daily, lisinopril 10mg daily  #HLD - c/w home statin  #Asthma - not in acute exacerbation. On singulair    PPx: SQL    Plan  Pt amenable to trial of IV zosyn today.   Monitor for diarrhea. All bowel regimen is PRN. Pt counseled to monitor for diarrhea. Continue to monitor for any fevers or 3 or more BM daily.    Above d/w housestaff
79F w cholangiocarcinoma s/p chemoembolization (follows w Dr. Arvizu) w intrahepatic lesion in L lobe, recently on cycle 4 10/18/24 of gemcitabine/cisplatin/durvalumab, vestibular neuritis, HTN, HLD, ovarian CA s/p LUIS-BSO, p/w recent fevers, s/p 7d PO augmentin outpatient after being seen in the ED, now w ongoing fevers, poor appetite and intake, increased weakness, admitted for evaluation of persistent fevers.    Pt spiked fevers this morning 100.6. States abdominal pain slightly improved today vs yesterday. Eating wo N/V/abd pain. No diarrhea. Feels level of energy overall is about the same. Ambulating wo LH/dizziness.  Exam: female in NAD on RA, MMM no thrush, RRR, nml resp effort, CTAB, abd firm wo tenderness, NABS, alert, moving all ext. 5/5 in BLE.     #Fever. ; Procalcitonin 0.2. UA negative for bacteruria. RVP negative.   #Cholangiocarcinoma    #Normocytic anemia - 7.4 from 8 from 9.2. Asymptomatic. Does not appear to have acute blood loss. Possibly AoCD i/s/o malignancy.  #Hyponatremia - 130 from 135. Encourage PO/fluid intake  #HTN - amlodipine 2.5mg daily, atenolol 25mg daily, lisinopril 10mg daily  #HLD - c/w home statin  #Asthma - not in acute exacerbation. On singulair    PPx: SQL    Plan  BCx NGTD  Obtain MRI liver.   F/U ID recs - Continue monitoring fevers for fevers. Low threshold for starting zosyn if hemodynamically unstable.  Above d/w housestaff, ID - Dr. Davidson
Pt seen and examined w housestaff  79F w cholangiocarcinoma s/p chemoembolization (follows w Dr. Arvizu) w intrahepatic lesion in L lobe, recently on cycle 4 10/18/24 of gemcitabine/cisplatin/durvalumab, vestibular neuritis, HTN, HLD, ovarian CA s/p LUIS-BSO, p/w recent fevers, s/p 7d PO augmentin outpatient after being seen in the ED, now w ongoing fevers, poor appetite and intake, increased weakness, admitted for evaluation of persistent fevers.    Pt afebrile overnight. Reports some reflux like sxs while taking augmentin - states symptoms improving however still having poor appetite and PO/Fluid intake. Denies odynophagia, dysphagia, N/V. Has abd pain exacerbated from supine to sit/stand. No BRBPR or Melenic stools, chest pain, dyspnea, URI sxs, rash.   Exam: female in NAD on RA, MMM no thrush, RRR, nml resp effort, CTAB, abd firm wo tenderness, NABS, alert, moving all ext. 5/5 in BLE.     #Fever. ; Procalcitonin 0.2. UA negative for bacteruria. RVP negative.   #Choleangiocarcinoma    #Normocytic anemia - 8 from 9.2. Asymptomatic. Does not appear to have acute blood loss. Possibly AoCD i/s/o malignancy.  #HTN - amlodipine 2.5mg daily, atenolol 25mg daily, lisinopril 10mg daily  #HLD - c/w home statin  #Asthma - not in acute exacerbation. On singulair    PPx: SQL  Plan  Overall - pt has been afebrile and non-toxic appearing. Suspicion is likely from necrotic tumor. Pt has no other isolating ROS. Continue to monitor for fevers  f/u BCx - NGTD  Noted ID recs. MRI Liver. BCx drawn from [employer]. Monitor off abx at this time  Above d/w housestaff, ID - Dr. Davidson; Onc - Dr. Arvizu

## 2024-11-18 NOTE — PROGRESS NOTE ADULT - PROBLEM SELECTOR PLAN 1
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 initial ED w/u on 11/1 negative for bacteremia, UTI, progression of malignancy per imaging. given fever + abd pain, ddx liver abscess, sbp, cholangitis, possibly central line infection of chemo port. Possibly tumor/chemo fevers. Fevers refractory to 5d of augmentin.   CXR unremarkable. UA largely unremarkable, ucx growing normal urogenital pedrito. Urine strep neg. COVID, flu, RSV neg. Full RVP neg. Hepatitis neg. HIV neg.  RUQ/abdominal u/s 11/13: Since November 1, 2024, no significant change since the CT exam. Left hepatic vein again not visualized. Left portal vein branch not definitely visualized. Unchanged intrahepatic ductal dilatation and hepatic masses consistent with known cholangiocarcinoma.  MRI abd 11/14: no significant change in intrahepatic cholangiocarcinoma, new hypoenhancement throughout L hepatic lobe likely represents post-tx change, persistent intrahepatic biliary ductal dilatation  11/14: Pt with fever to 100.6F with subjective chills.  Pt has been afebrile without focal symptoms while off abx since.    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 initial ED w/u on 11/1 negative for bacteremia, UTI, progression of malignancy per imaging. given fever + abd pain, ddx liver abscess, sbp, cholangitis, possibly central line infection of chemo port. Possibly tumor/chemo fevers. Fevers refractory to 5d of augmentin.   CXR unremarkable. UA largely unremarkable, ucx growing normal urogenital pedrito. Urine strep neg. COVID, flu, RSV neg. Full RVP neg. Hepatitis neg. HIV neg.  RUQ/abdominal u/s 11/13: Since November 1, 2024, no significant change since the CT exam. Left hepatic vein again not visualized. Left portal vein branch not definitely visualized. Unchanged intrahepatic ductal dilatation and hepatic masses consistent with known cholangiocarcinoma.  MRI abd 11/14: no significant change in intrahepatic cholangiocarcinoma, new hypoenhancement throughout L hepatic lobe likely represents post-tx change, persistent intrahepatic biliary ductal dilatation  11/14: Pt with fever to 100.6F with subjective chills.  Pt has been afebrile without focal symptoms while off abx since.    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 initial ED w/u on 11/1 negative for bacteremia, UTI, progression of malignancy per imaging. given fever + abd pain, ddx liver abscess, sbp, cholangitis, possibly central line infection of chemo port. Possibly tumor/chemo fevers. Fevers refractory to 5d of augmentin.   CXR unremarkable. UA largely unremarkable, ucx growing normal urogenital pedrito. Urine strep neg. COVID, flu, RSV neg. Full RVP neg. Hepatitis neg. HIV neg.  RUQ/abdominal u/s 11/13: Since November 1, 2024, no significant change since the CT exam. Left hepatic vein again not visualized. Left portal vein branch not definitely visualized. Unchanged intrahepatic ductal dilatation and hepatic masses consistent with known cholangiocarcinoma.  MRI abd 11/14: no significant change in intrahepatic cholangiocarcinoma, new hypoenhancement throughout L hepatic lobe likely represents post-tx change, persistent intrahepatic biliary ductal dilatation  11/14: Pt with fever to 100.6F with subjective chills.  11/16: Started trial of zosyn 4.5mg q8hr.   Pt has been afebrile without focal symptoms since 11/14.    Plan  - continue zosyn 4.5mg q8hr for at least 48 hrs; monitor for diarrhea and/or 3+ BM per day; bowel regimen PRN  - f/u surgery recs  - f/u heme/onc recs   - f/u blood cultures drawn in ED - NGTD   - f/u blood cultures from [facility] - NGTD  - DISPO: d/c today or tomorrow pending hem/onc input and Hgb; ID signed off; pt declines HPT

## 2024-11-18 NOTE — BH CONSULTATION LIAISON ASSESSMENT NOTE - HPI (INCLUDE ILLNESS QUALITY, SEVERITY, DURATION, TIMING, CONTEXT, MODIFYING FACTORS, ASSOCIATED SIGNS AND SYMPTOMS)
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 Arvizu), vestibular neuritis, remote ovarian cancer, HTN, HLD presenting with several weeks of fever and abdominal pain, likely due to necrosis of cholangiocarcinoma 2/2 chemotherapy. Psychiatry was consulted to evaluate the patient for anxiety/coping with cancer. Patient was seen at bedside. She endorses feelings of frustration and anxiety related to being in the hospital, the uncertainty of her diagnosis and prognosis.  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 Arvizu), vestibular neuritis, remote ovarian cancer, HTN, HLD presenting with several weeks of fever and abdominal pain, likely due to necrosis of cholangiocarcinoma 2/2 chemotherapy. Psychiatry was consulted to evaluate the patient for anxiety/coping with cancer. Patient was seen at bedside. She endorses fluctuations in mood from frustration and anxiety to depression; as reaction to being in the hospital, the uncertainty of her diagnosis and prognosis and different interactions with the staff. Pt admits that her prior mistrust in the medical system, coping and problem solving strategies are likely affecting her dynamic with her team. Pt reports that prior to her admission, she was experiencing self limited episodes of anxiety triggered by medical appt, anticipation of results of workup and consequences of the treatment. Pt response to taking lorazepam 0.5mg prn prescribed by her PCP. Pt denies any significant depressive symptoms prior to her admissions. She denies any history of SI/Hi/AVH/PI or prior psychiatric treatment.   Pt reports a good support system that usually includes her cousin (not available during this admission) and several friends.   Pt used to work as a mental health .

## 2024-11-18 NOTE — BH CONSULTATION LIAISON ASSESSMENT NOTE - SUMMARY
78 y/o woman with no PPH and PMHx of 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 Arvizu), vestibular neuritis, remote ovarian cancer, HTN, HLD presenting with several weeks of fever and abdominal pain, likely due to necrosis of cholangiocarcinoma 2/2 chemotherapy. Psychiatry was consulted to evaluate the patient for anxiety/coping with cancer. Pt currently endorses mood sx and anxiety in context of coping with the uncertainty of the treatment response, further options of treatment and prognosis. Pt's prior history of mistrust in the medical field, lifelong pattern of self reliance and approach to problem solving are contributing to her current coping. Pt would benefit from engaging in individual psychotherapy in the outpatient [facility] program.  Plan:  -continue current treatment with lorazepam 0.5mg po q12h prn anxiety  -pt was provided psychoeducation and supportive psychotherapy  -patient to be referred for individual outpatient psychotherapy to dr Nikki Villa PhD ([facility])  -pt was also provided with referral information for outpt med management  -CL to follow as needed, please call with questions/concerns

## 2024-11-18 NOTE — PROGRESS NOTE ADULT - SUBJECTIVE AND OBJECTIVE BOX
OVERNIGHT EVENTS:    SUBJECTIVE / INTERVAL HPI: Patient seen and examined at bedside.       PHYSICAL EXAM:    General: NAD  HEENT: NC/AT; PERRL, anicteric sclera; MMM  Neck: supple  Cardiovascular: +S1/S2, RRR  Respiratory: CTA B/L; no W/R/R  Gastrointestinal: soft, NT/ND; +BSx4  Extremities: WWP; no edema, clubbing or cyanosis  Vascular: 2+ radial, DP/PT pulses B/L  Neurological: AAOx3; no focal deficits  Psychiatric: pleasant mood and affect  Dermatologic: no appreciable wounds or damage to the skin    VITAL SIGNS:  Vital Signs Last 24 Hrs  T(C): 36.7 (18 Nov 2024 06:00), Max: 36.8 (17 Nov 2024 11:54)  T(F): 98 (18 Nov 2024 06:00), Max: 98.3 (18 Nov 2024 03:27)  HR: 84 (18 Nov 2024 06:00) (81 - 93)  BP: 115/72 (18 Nov 2024 06:00) (101/61 - 115/72)  BP(mean): --  RR: 18 (18 Nov 2024 06:00) (18 - 20)  SpO2: 97% (18 Nov 2024 06:00) (97% - 99%)    Parameters below as of 18 Nov 2024 06:00  Patient On (Oxygen Delivery Method): room air          MEDICATIONS:  MEDICATIONS  (STANDING):  amLODIPine   Tablet 2.5 milliGRAM(s) Oral daily  atenolol  Tablet 25 milliGRAM(s) Oral daily  atorvastatin 10 milliGRAM(s) Oral at bedtime  enoxaparin Injectable 40 milliGRAM(s) SubCutaneous every 24 hours  lisinopril 10 milliGRAM(s) Oral daily  montelukast 10 milliGRAM(s) Oral daily  pantoprazole    Tablet 40 milliGRAM(s) Oral before breakfast  piperacillin/tazobactam IVPB.. 4.5 Gram(s) IV Intermittent every 8 hours    MEDICATIONS  (PRN):  ibuprofen  Tablet. 200 milliGRAM(s) Oral every 8 hours PRN Temp greater or equal to 38C (100.4F), Mild Pain (1 - 3)  LORazepam     Tablet 0.5 milliGRAM(s) Oral every 12 hours PRN Anxiety  polyethylene glycol 3350 17 Gram(s) Oral two times a day PRN Constipation  senna 2 Tablet(s) Oral at bedtime PRN Constipation      ALLERGIES:  Allergies    narcotic analgesics (Other)    Intolerances        LABS:                        8.4    9.70  )-----------( 343      ( 17 Nov 2024 22:00 )             26.7     11-17    132[L]  |  99  |  18  ----------------------------<  113[H]  4.5   |  24  |  0.75    Ca    8.9      17 Nov 2024 08:40  Phos  3.4     11-17  Mg     1.9     11-17    TPro  6.6  /  Alb  3.1[L]  /  TBili  0.2  /  DBili  x   /  AST  42[H]  /  ALT  49[H]  /  AlkPhos  245[H]  11-17      Urinalysis Basic - ( 17 Nov 2024 08:40 )    Color: x / Appearance: x / SG: x / pH: x  Gluc: 113 mg/dL / Ketone: x  / Bili: x / Urobili: x   Blood: x / Protein: x / Nitrite: x   Leuk Esterase: x / RBC: x / WBC x   Sq Epi: x / Non Sq Epi: x / Bacteria: x      CAPILLARY BLOOD GLUCOSE          RADIOLOGY & ADDITIONAL TESTS: Reviewed. OVERNIGHT EVENTS: 10 PM CBC drawn (to f/u s/p pRBC infusion).    SUBJECTIVE / INTERVAL HPI: Patient seen and examined at bedside. Pt reports that yesterday she felt "tired and drained." She states that she feels more "perky" and energetic after the transfusion was administered yesterday. Pt reports an episode of night sweats last night; she states that when her temperature was checked she was afebrile. She reports a bowel movement at approximately 6 AM this morning; states no straining involved and the stool was formed. Pt seen ambulating in the hospital hallway today.    PHYSICAL EXAM:  General: Well-appearing, no apparent distress  HEENT: Anicteric sclera  Neck: Supple  Cardiovascular: Regular rate and rhythm, +S1/S2  Respiratory: Clear to auscultation B/L; no wheezes, rales, or rhonchi; pt breathing without difficulty and speaking in full sentences  Gastrointestinal: Some firmness at the epigastric region and right upper quadrant, otherwise soft; denies tenderness to palpation; hepatomegaly present; +BSx4  Extremities: Warm and well perfused; no edema, erythema, edema, rash, clubbing, or cyanosis of visibile skin; bilateral calves soft and nontender  Vascular: Radial and dorsalis pedis palpable B/L  Neurological: A&Ox3; no focal deficits  Psychiatric: Pleasant mood and affect  Dermatologic: No appreciable wounds, rash, damage to the visible skin; chemo port located at right upper chest without erythema, ecchymosis, rash    VITAL SIGNS:  Vital Signs Last 24 Hrs  T(C): 36.7 (18 Nov 2024 06:00), Max: 36.8 (17 Nov 2024 11:54)  T(F): 98 (18 Nov 2024 06:00), Max: 98.3 (18 Nov 2024 03:27)  HR: 84 (18 Nov 2024 06:00) (81 - 93)  BP: 115/72 (18 Nov 2024 06:00) (101/61 - 115/72)  BP(mean): --  RR: 18 (18 Nov 2024 06:00) (18 - 20)  SpO2: 97% (18 Nov 2024 06:00) (97% - 99%)    Parameters below as of 18 Nov 2024 06:00  Patient On (Oxygen Delivery Method): room air    MEDICATIONS:  MEDICATIONS (STANDING):  amLODIPine Tablet 2.5 milliGRAM(s) Oral daily  atenolol Tablet 25 milliGRAM(s) Oral daily  atorvastatin 10 milliGRAM(s) Oral at bedtime  enoxaparin Injectable 40 milliGRAM(s) SubCutaneous every 24 hours  lisinopril 10 milliGRAM(s) Oral daily  montelukast 10 milliGRAM(s) Oral daily  pantoprazole Tablet 40 milliGRAM(s) Oral before breakfast  piperacillin/tazobactam IVPB.. 4.5 Gram(s) IV Intermittent every 8 hours    MEDICATIONS (PRN):  ibuprofen Tablet. 200 milliGRAM(s) Oral every 8 hours PRN Temp greater or equal to 38C (100.4F), Mild Pain (1 - 3)  LORazepam Tablet 0.5 milliGRAM(s) Oral every 12 hours PRN Anxiety  polyethylene glycol 3350 17 Gram(s) Oral two times a day PRN Constipation  senna 2 Tablet(s) Oral at bedtime PRN Constipation    ALLERGIES:  narcotic analgesics (Other)    LABS:                        8.4    9.70  )-----------( 343      ( 17 Nov 2024 22:00 )             26.7     11-17    132[L]  |  99  |  18  ----------------------------<  113[H]  4.5   |  24  |  0.75    Ca    8.9      17 Nov 2024 08:40  Phos  3.4     11-17  Mg     1.9     11-17    TPro  6.6  /  Alb  3.1[L]  /  TBili  0.2  /  DBili  x   /  AST  42[H]  /  ALT  49[H]  /  AlkPhos  245[H]  11-17    Urinalysis Basic - ( 17 Nov 2024 08:40 )    Color: x / Appearance: x / SG: x / pH: x  Gluc: 113 mg/dL / Ketone: x  / Bili: x / Urobili: x   Blood: x / Protein: x / Nitrite: x   Leuk Esterase: x / RBC: x / WBC x   Sq Epi: x / Non Sq Epi: x / Bacteria: x    CAPILLARY BLOOD GLUCOSE          RADIOLOGY & ADDITIONAL TESTS: Reviewed.

## 2024-11-18 NOTE — PROGRESS NOTE ADULT - PROBLEM SELECTOR PLAN 8
D: regular  E: replete prn  DVT: lovenox 40mg qd  F: tolerating PO  Dispo: F

## 2024-11-18 NOTE — PROGRESS NOTE ADULT - PROVIDER SPECIALTY LIST ADULT
Heme/Onc
Hospitalist
Internal Medicine
Surgery
Heme/Onc
Infectious Disease
Infectious Disease
Surgery
Surgery
Internal Medicine

## 2024-11-19 ENCOUNTER — APPOINTMENT (OUTPATIENT)
Dept: SURGICAL ONCOLOGY | Facility: CLINIC | Age: 79
End: 2024-11-19

## 2024-11-19 ENCOUNTER — TRANSCRIPTION ENCOUNTER (OUTPATIENT)
Age: 79
End: 2024-11-19

## 2024-11-19 VITALS
RESPIRATION RATE: 18 BRPM | SYSTOLIC BLOOD PRESSURE: 114 MMHG | DIASTOLIC BLOOD PRESSURE: 58 MMHG | TEMPERATURE: 98 F | HEART RATE: 82 BPM | OXYGEN SATURATION: 97 %

## 2024-11-19 LAB
ALBUMIN SERPL ELPH-MCNC: 3 G/DL — LOW (ref 3.3–5)
ALP SERPL-CCNC: 197 U/L — HIGH (ref 40–120)
ALT FLD-CCNC: 33 U/L — SIGNIFICANT CHANGE UP (ref 10–45)
ANION GAP SERPL CALC-SCNC: 9 MMOL/L — SIGNIFICANT CHANGE UP (ref 5–17)
AST SERPL-CCNC: 25 U/L — SIGNIFICANT CHANGE UP (ref 10–40)
BASOPHILS # BLD AUTO: 0.07 K/UL — SIGNIFICANT CHANGE UP (ref 0–0.2)
BASOPHILS NFR BLD AUTO: 0.7 % — SIGNIFICANT CHANGE UP (ref 0–2)
BILIRUB SERPL-MCNC: 0.2 MG/DL — SIGNIFICANT CHANGE UP (ref 0.2–1.2)
BUN SERPL-MCNC: 20 MG/DL — SIGNIFICANT CHANGE UP (ref 7–23)
CALCIUM SERPL-MCNC: 8.9 MG/DL — SIGNIFICANT CHANGE UP (ref 8.4–10.5)
CHLORIDE SERPL-SCNC: 102 MMOL/L — SIGNIFICANT CHANGE UP (ref 96–108)
CO2 SERPL-SCNC: 25 MMOL/L — SIGNIFICANT CHANGE UP (ref 22–31)
CREAT SERPL-MCNC: 0.85 MG/DL — SIGNIFICANT CHANGE UP (ref 0.5–1.3)
EGFR: 71 ML/MIN/1.73M2 — SIGNIFICANT CHANGE UP
EOSINOPHIL # BLD AUTO: 0.13 K/UL — SIGNIFICANT CHANGE UP (ref 0–0.5)
EOSINOPHIL NFR BLD AUTO: 1.3 % — SIGNIFICANT CHANGE UP (ref 0–6)
GLUCOSE SERPL-MCNC: 102 MG/DL — HIGH (ref 70–99)
HCT VFR BLD CALC: 26.1 % — LOW (ref 34.5–45)
HGB BLD-MCNC: 8 G/DL — LOW (ref 11.5–15.5)
IMM GRANULOCYTES NFR BLD AUTO: 5 % — HIGH (ref 0–0.9)
LYMPHOCYTES # BLD AUTO: 1.11 K/UL — SIGNIFICANT CHANGE UP (ref 1–3.3)
LYMPHOCYTES # BLD AUTO: 11 % — LOW (ref 13–44)
MAGNESIUM SERPL-MCNC: 1.9 MG/DL — SIGNIFICANT CHANGE UP (ref 1.6–2.6)
MCHC RBC-ENTMCNC: 29.7 PG — SIGNIFICANT CHANGE UP (ref 27–34)
MCHC RBC-ENTMCNC: 30.7 G/DL — LOW (ref 32–36)
MCV RBC AUTO: 97 FL — SIGNIFICANT CHANGE UP (ref 80–100)
MONOCYTES # BLD AUTO: 1.03 K/UL — HIGH (ref 0–0.9)
MONOCYTES NFR BLD AUTO: 10.2 % — SIGNIFICANT CHANGE UP (ref 2–14)
NEUTROPHILS # BLD AUTO: 7.26 K/UL — SIGNIFICANT CHANGE UP (ref 1.8–7.4)
NEUTROPHILS NFR BLD AUTO: 71.8 % — SIGNIFICANT CHANGE UP (ref 43–77)
NRBC # BLD: 0 /100 WBCS — SIGNIFICANT CHANGE UP (ref 0–0)
PHOSPHATE SERPL-MCNC: 4.2 MG/DL — SIGNIFICANT CHANGE UP (ref 2.5–4.5)
PLATELET # BLD AUTO: 306 K/UL — SIGNIFICANT CHANGE UP (ref 150–400)
POTASSIUM SERPL-MCNC: 4.6 MMOL/L — SIGNIFICANT CHANGE UP (ref 3.5–5.3)
POTASSIUM SERPL-SCNC: 4.6 MMOL/L — SIGNIFICANT CHANGE UP (ref 3.5–5.3)
PROT SERPL-MCNC: 6.3 G/DL — SIGNIFICANT CHANGE UP (ref 6–8.3)
RBC # BLD: 2.69 M/UL — LOW (ref 3.8–5.2)
RBC # FLD: 17.8 % — HIGH (ref 10.3–14.5)
SODIUM SERPL-SCNC: 136 MMOL/L — SIGNIFICANT CHANGE UP (ref 135–145)
WBC # BLD: 10.11 K/UL — SIGNIFICANT CHANGE UP (ref 3.8–10.5)
WBC # FLD AUTO: 10.11 K/UL — SIGNIFICANT CHANGE UP (ref 3.8–10.5)

## 2024-11-19 PROCEDURE — 84443 ASSAY THYROID STIM HORMONE: CPT

## 2024-11-19 PROCEDURE — 93975 VASCULAR STUDY: CPT

## 2024-11-19 PROCEDURE — 82746 ASSAY OF FOLIC ACID SERUM: CPT

## 2024-11-19 PROCEDURE — 86308 HETEROPHILE ANTIBODY SCREEN: CPT

## 2024-11-19 PROCEDURE — 87040 BLOOD CULTURE FOR BACTERIA: CPT

## 2024-11-19 PROCEDURE — 83735 ASSAY OF MAGNESIUM: CPT

## 2024-11-19 PROCEDURE — 87637 SARSCOV2&INF A&B&RSV AMP PRB: CPT

## 2024-11-19 PROCEDURE — 82607 VITAMIN B-12: CPT

## 2024-11-19 PROCEDURE — 83550 IRON BINDING TEST: CPT

## 2024-11-19 PROCEDURE — 87086 URINE CULTURE/COLONY COUNT: CPT

## 2024-11-19 PROCEDURE — 36415 COLL VENOUS BLD VENIPUNCTURE: CPT

## 2024-11-19 PROCEDURE — 80076 HEPATIC FUNCTION PANEL: CPT

## 2024-11-19 PROCEDURE — 85027 COMPLETE CBC AUTOMATED: CPT

## 2024-11-19 PROCEDURE — 80053 COMPREHEN METABOLIC PANEL: CPT

## 2024-11-19 PROCEDURE — 87899 AGENT NOS ASSAY W/OPTIC: CPT

## 2024-11-19 PROCEDURE — A9585: CPT

## 2024-11-19 PROCEDURE — 86850 RBC ANTIBODY SCREEN: CPT

## 2024-11-19 PROCEDURE — 99285 EMERGENCY DEPT VISIT HI MDM: CPT | Mod: 25

## 2024-11-19 PROCEDURE — 80074 ACUTE HEPATITIS PANEL: CPT

## 2024-11-19 PROCEDURE — 0225U NFCT DS DNA&RNA 21 SARSCOV2: CPT

## 2024-11-19 PROCEDURE — 86923 COMPATIBILITY TEST ELECTRIC: CPT

## 2024-11-19 PROCEDURE — 74183 MRI ABD W/O CNTR FLWD CNTR: CPT | Mod: MC

## 2024-11-19 PROCEDURE — 80048 BASIC METABOLIC PNL TOTAL CA: CPT

## 2024-11-19 PROCEDURE — 84100 ASSAY OF PHOSPHORUS: CPT

## 2024-11-19 PROCEDURE — 83605 ASSAY OF LACTIC ACID: CPT

## 2024-11-19 PROCEDURE — 86900 BLOOD TYPING SEROLOGIC ABO: CPT

## 2024-11-19 PROCEDURE — 71046 X-RAY EXAM CHEST 2 VIEWS: CPT

## 2024-11-19 PROCEDURE — 85652 RBC SED RATE AUTOMATED: CPT

## 2024-11-19 PROCEDURE — 86140 C-REACTIVE PROTEIN: CPT

## 2024-11-19 PROCEDURE — 85025 COMPLETE CBC W/AUTO DIFF WBC: CPT

## 2024-11-19 PROCEDURE — P9040: CPT

## 2024-11-19 PROCEDURE — 83540 ASSAY OF IRON: CPT

## 2024-11-19 PROCEDURE — 82728 ASSAY OF FERRITIN: CPT

## 2024-11-19 PROCEDURE — 87389 HIV-1 AG W/HIV-1&-2 AB AG IA: CPT

## 2024-11-19 PROCEDURE — 81001 URINALYSIS AUTO W/SCOPE: CPT

## 2024-11-19 PROCEDURE — 85045 AUTOMATED RETICULOCYTE COUNT: CPT

## 2024-11-19 PROCEDURE — 36430 TRANSFUSION BLD/BLD COMPNT: CPT

## 2024-11-19 PROCEDURE — 86480 TB TEST CELL IMMUN MEASURE: CPT

## 2024-11-19 PROCEDURE — 76705 ECHO EXAM OF ABDOMEN: CPT

## 2024-11-19 PROCEDURE — 86901 BLOOD TYPING SEROLOGIC RH(D): CPT

## 2024-11-19 PROCEDURE — 84466 ASSAY OF TRANSFERRIN: CPT

## 2024-11-19 PROCEDURE — 84145 PROCALCITONIN (PCT): CPT

## 2024-11-19 PROCEDURE — 93005 ELECTROCARDIOGRAM TRACING: CPT

## 2024-11-19 RX ORDER — IBUPROFEN 200 MG
1 TABLET ORAL
Qty: 0 | Refills: 0 | DISCHARGE
Start: 2024-11-19

## 2024-11-19 RX ADMIN — Medication 200 MILLIGRAM(S): at 10:31

## 2024-11-19 RX ADMIN — PIPERACILLIN SODIUM AND TAZOBACTAM SODIUM 25 GRAM(S): 4; .5 INJECTION, POWDER, LYOPHILIZED, FOR SOLUTION INTRAVENOUS at 06:05

## 2024-11-19 RX ADMIN — Medication 200 MILLIGRAM(S): at 09:31

## 2024-11-19 RX ADMIN — PANTOPRAZOLE SODIUM 40 MILLIGRAM(S): 40 TABLET, DELAYED RELEASE ORAL at 06:05

## 2024-11-19 NOTE — DISCHARGE NOTE NURSING/CASE MANAGEMENT/SOCIAL WORK - NSDCPEFALRISK_GEN_ALL_CORE
For information on Fall & Injury Prevention, visit: [email or web address] OR  [email or web address] OR  https://www.cdc.gov/steadi/patient.html

## 2024-11-19 NOTE — DISCHARGE NOTE NURSING/CASE MANAGEMENT/SOCIAL WORK - FINANCIAL ASSISTANCE
[facility] provides services at a reduced cost to those who are determined to be eligible through [facility]’s financial assistance program. Information regarding [facility]’s financial assistance program can be found by going to [email or web address] or by calling [phone].

## 2024-11-19 NOTE — DISCHARGE NOTE NURSING/CASE MANAGEMENT/SOCIAL WORK - PATIENT PORTAL LINK FT
You can access the FollowMyHealth Patient Portal offered by [facility] by registering at the following website: [email or web address]. By joining [employer]’s FollowMyHealth portal, you will also be able to view your health information using other applications (apps) compatible with our system.

## 2024-11-20 PROBLEM — E78.5 HYPERLIPIDEMIA, UNSPECIFIED: Chronic | Status: ACTIVE | Noted: 2024-11-12

## 2024-11-20 PROBLEM — C22.1 INTRAHEPATIC BILE DUCT CARCINOMA: Chronic | Status: ACTIVE | Noted: 2024-11-12

## 2024-11-20 PROBLEM — C56.9 MALIGNANT NEOPLASM OF UNSPECIFIED OVARY: Chronic | Status: ACTIVE | Noted: 2024-11-12

## 2024-11-20 PROBLEM — H81.20 VESTIBULAR NEURONITIS, UNSPECIFIED EAR: Chronic | Status: ACTIVE | Noted: 2024-11-12

## 2024-11-20 PROBLEM — I10 ESSENTIAL (PRIMARY) HYPERTENSION: Chronic | Status: ACTIVE | Noted: 2024-11-12

## 2024-11-20 LAB
GAMMA INTERFERON BACKGROUND BLD IA-ACNC: 0.04 IU/ML — SIGNIFICANT CHANGE UP
M TB IFN-G BLD-IMP: ABNORMAL
M TB IFN-G CD4+ BCKGRND COR BLD-ACNC: 0 IU/ML — SIGNIFICANT CHANGE UP
M TB IFN-G CD4+CD8+ BCKGRND COR BLD-ACNC: 0.01 IU/ML — SIGNIFICANT CHANGE UP
QUANT TB PLUS MITOGEN MINUS NIL: 0.08 IU/ML — SIGNIFICANT CHANGE UP

## 2024-11-20 PROCEDURE — 96415 CHEMO IV INFUSION ADDL HR: CPT

## 2024-11-20 PROCEDURE — 74183 MRI ABD W/O CNTR FLWD CNTR: CPT

## 2024-11-20 PROCEDURE — 96361 HYDRATE IV INFUSION ADD-ON: CPT

## 2024-11-20 PROCEDURE — 87040 BLOOD CULTURE FOR BACTERIA: CPT

## 2024-11-20 PROCEDURE — 96417 CHEMO IV INFUS EACH ADDL SEQ: CPT

## 2024-11-20 PROCEDURE — 96375 TX/PRO/DX INJ NEW DRUG ADDON: CPT

## 2024-11-20 PROCEDURE — 36415 COLL VENOUS BLD VENIPUNCTURE: CPT

## 2024-11-20 PROCEDURE — 83735 ASSAY OF MAGNESIUM: CPT

## 2024-11-20 PROCEDURE — 99284 EMERGENCY DEPT VISIT MOD MDM: CPT | Mod: 25

## 2024-11-20 PROCEDURE — 74177 CT ABD & PELVIS W/CONTRAST: CPT | Mod: MC

## 2024-11-20 PROCEDURE — 85025 COMPLETE CBC W/AUTO DIFF WBC: CPT

## 2024-11-20 PROCEDURE — 96413 CHEMO IV INFUSION 1 HR: CPT

## 2024-11-20 PROCEDURE — A9585: CPT

## 2024-11-20 PROCEDURE — 83690 ASSAY OF LIPASE: CPT

## 2024-11-20 PROCEDURE — 96367 TX/PROPH/DG ADDL SEQ IV INF: CPT

## 2024-11-20 PROCEDURE — 80048 BASIC METABOLIC PNL TOTAL CA: CPT

## 2024-11-20 PROCEDURE — 80076 HEPATIC FUNCTION PANEL: CPT

## 2024-11-20 PROCEDURE — 81003 URINALYSIS AUTO W/O SCOPE: CPT

## 2024-11-20 PROCEDURE — 80053 COMPREHEN METABOLIC PANEL: CPT

## 2024-11-21 ENCOUNTER — APPOINTMENT (OUTPATIENT)
Dept: INFUSION THERAPY | Facility: CLINIC | Age: 79
End: 2024-11-21

## 2024-11-21 ENCOUNTER — APPOINTMENT (OUTPATIENT)
Dept: HEMATOLOGY ONCOLOGY | Facility: CLINIC | Age: 79
End: 2024-11-21
Payer: MEDICARE

## 2024-11-21 ENCOUNTER — APPOINTMENT (OUTPATIENT)
Dept: HEMATOLOGY ONCOLOGY | Facility: CLINIC | Age: 79
End: 2024-11-21

## 2024-11-21 VITALS
HEIGHT: 68 IN | TEMPERATURE: 97.9 F | SYSTOLIC BLOOD PRESSURE: 121 MMHG | DIASTOLIC BLOOD PRESSURE: 64 MMHG | WEIGHT: 155 LBS | OXYGEN SATURATION: 96 % | BODY MASS INDEX: 23.49 KG/M2 | HEART RATE: 97 BPM | RESPIRATION RATE: 18 BRPM

## 2024-11-21 DIAGNOSIS — R50.9 FEVER, UNSPECIFIED: ICD-10-CM

## 2024-11-21 DIAGNOSIS — D64.9 ANEMIA, UNSPECIFIED: ICD-10-CM

## 2024-11-21 PROCEDURE — 99215 OFFICE O/P EST HI 40 MIN: CPT

## 2024-11-26 ENCOUNTER — APPOINTMENT (OUTPATIENT)
Dept: SURGICAL ONCOLOGY | Facility: CLINIC | Age: 79
End: 2024-11-26
Payer: MEDICARE

## 2024-11-26 VITALS
DIASTOLIC BLOOD PRESSURE: 81 MMHG | SYSTOLIC BLOOD PRESSURE: 126 MMHG | TEMPERATURE: 97.2 F | HEIGHT: 68 IN | BODY MASS INDEX: 23.64 KG/M2 | OXYGEN SATURATION: 99 % | HEART RATE: 94 BPM | RESPIRATION RATE: 17 BRPM | WEIGHT: 156 LBS

## 2024-11-26 DIAGNOSIS — C22.1 INTRAHEPATIC BILE DUCT CARCINOMA: ICD-10-CM

## 2024-11-26 PROCEDURE — 99215 OFFICE O/P EST HI 40 MIN: CPT

## 2024-12-03 DIAGNOSIS — G58.8 OTHER SPECIFIED MONONEUROPATHIES: ICD-10-CM

## 2024-12-03 DIAGNOSIS — J45.909 UNSPECIFIED ASTHMA, UNCOMPLICATED: ICD-10-CM

## 2024-12-03 DIAGNOSIS — Z90.710 ACQUIRED ABSENCE OF BOTH CERVIX AND UTERUS: ICD-10-CM

## 2024-12-03 DIAGNOSIS — H81.20 VESTIBULAR NEURONITIS, UNSPECIFIED EAR: ICD-10-CM

## 2024-12-03 DIAGNOSIS — Z85.43 PERSONAL HISTORY OF MALIGNANT NEOPLASM OF OVARY: ICD-10-CM

## 2024-12-03 DIAGNOSIS — K21.9 GASTRO-ESOPHAGEAL REFLUX DISEASE WITHOUT ESOPHAGITIS: ICD-10-CM

## 2024-12-03 DIAGNOSIS — C22.1 INTRAHEPATIC BILE DUCT CARCINOMA: ICD-10-CM

## 2024-12-03 DIAGNOSIS — Z90.79 ACQUIRED ABSENCE OF OTHER GENITAL ORGAN(S): ICD-10-CM

## 2024-12-03 DIAGNOSIS — D63.0 ANEMIA IN NEOPLASTIC DISEASE: ICD-10-CM

## 2024-12-03 DIAGNOSIS — E87.1 HYPO-OSMOLALITY AND HYPONATREMIA: ICD-10-CM

## 2024-12-03 DIAGNOSIS — Z88.5 ALLERGY STATUS TO NARCOTIC AGENT: ICD-10-CM

## 2024-12-03 DIAGNOSIS — Z90.722 ACQUIRED ABSENCE OF OVARIES, BILATERAL: ICD-10-CM

## 2024-12-03 DIAGNOSIS — I10 ESSENTIAL (PRIMARY) HYPERTENSION: ICD-10-CM

## 2024-12-03 DIAGNOSIS — E78.5 HYPERLIPIDEMIA, UNSPECIFIED: ICD-10-CM

## 2024-12-03 DIAGNOSIS — D84.9 IMMUNODEFICIENCY, UNSPECIFIED: ICD-10-CM

## 2024-12-05 ENCOUNTER — APPOINTMENT (OUTPATIENT)
Dept: INFUSION THERAPY | Facility: CLINIC | Age: 79
End: 2024-12-05

## 2024-12-10 ENCOUNTER — APPOINTMENT (OUTPATIENT)
Dept: NUCLEAR MEDICINE | Facility: HOSPITAL | Age: 79
End: 2024-12-10

## 2024-12-10 ENCOUNTER — OUTPATIENT (OUTPATIENT)
Dept: OUTPATIENT SERVICES | Facility: HOSPITAL | Age: 79
LOS: 1 days | End: 2024-12-10
Payer: MEDICARE

## 2024-12-10 PROCEDURE — 82962 GLUCOSE BLOOD TEST: CPT

## 2024-12-10 PROCEDURE — 78815 PET IMAGE W/CT SKULL-THIGH: CPT

## 2024-12-10 PROCEDURE — A9552: CPT

## 2024-12-10 PROCEDURE — 78815 PET IMAGE W/CT SKULL-THIGH: CPT | Mod: 26,PS,MH

## 2024-12-12 ENCOUNTER — APPOINTMENT (OUTPATIENT)
Dept: INFUSION THERAPY | Facility: CLINIC | Age: 79
End: 2024-12-12

## 2024-12-18 ENCOUNTER — NON-APPOINTMENT (OUTPATIENT)
Age: 79
End: 2024-12-18

## 2024-12-18 ENCOUNTER — APPOINTMENT (OUTPATIENT)
Dept: HEMATOLOGY ONCOLOGY | Facility: CLINIC | Age: 79
End: 2024-12-18
Payer: MEDICARE

## 2024-12-18 VITALS
SYSTOLIC BLOOD PRESSURE: 136 MMHG | DIASTOLIC BLOOD PRESSURE: 84 MMHG | HEIGHT: 68 IN | OXYGEN SATURATION: 99 % | TEMPERATURE: 97.8 F | BODY MASS INDEX: 23.64 KG/M2 | RESPIRATION RATE: 18 BRPM | HEART RATE: 85 BPM | WEIGHT: 156 LBS

## 2024-12-18 DIAGNOSIS — T45.1X5A TOXIC GASTROENTERITIS AND COLITIS: ICD-10-CM

## 2024-12-18 DIAGNOSIS — R11.2 NAUSEA WITH VOMITING, UNSPECIFIED: ICD-10-CM

## 2024-12-18 DIAGNOSIS — R60.9 EDEMA, UNSPECIFIED: ICD-10-CM

## 2024-12-18 DIAGNOSIS — K52.1 TOXIC GASTROENTERITIS AND COLITIS: ICD-10-CM

## 2024-12-18 DIAGNOSIS — T45.1X5A NAUSEA WITH VOMITING, UNSPECIFIED: ICD-10-CM

## 2024-12-18 DIAGNOSIS — C22.1 INTRAHEPATIC BILE DUCT CARCINOMA: ICD-10-CM

## 2024-12-18 LAB
ALBUMIN SERPL ELPH-MCNC: 3.4 G/DL
ALP BLD-CCNC: 380 U/L
ALT SERPL-CCNC: 23 U/L
ANION GAP SERPL CALC-SCNC: 4 MMOL/L
AST SERPL-CCNC: 34 U/L
BASOPHILS # BLD AUTO: 0.03 K/UL
BASOPHILS NFR BLD AUTO: 0.5 %
BILIRUB SERPL-MCNC: 0.5 MG/DL
BUN SERPL-MCNC: 26 MG/DL
CALCIUM SERPL-MCNC: 9.6 MG/DL
CHLORIDE SERPL-SCNC: 104 MMOL/L
CO2 SERPL-SCNC: 29 MMOL/L
CREAT SERPL-MCNC: 0.9 MG/DL
CRP SERPL-MCNC: 5.2 MG/L
EGFR: 66 ML/MIN/1.73M2
EOSINOPHIL # BLD AUTO: 0.03 K/UL
EOSINOPHIL NFR BLD AUTO: 0.5 %
GLUCOSE SERPL-MCNC: 107 MG/DL
HCT VFR BLD CALC: 33.1 %
HGB BLD-MCNC: 10 G/DL
IMM GRANULOCYTES NFR BLD AUTO: 0.5 %
LYMPHOCYTES # BLD AUTO: 0.77 K/UL
LYMPHOCYTES NFR BLD AUTO: 12.9 %
MAN DIFF?: NORMAL
MCHC RBC-ENTMCNC: 29.1 PG
MCHC RBC-ENTMCNC: 30.2 G/DL
MCV RBC AUTO: 96.2 FL
MONOCYTES # BLD AUTO: 0.45 K/UL
MONOCYTES NFR BLD AUTO: 7.5 %
NEUTROPHILS # BLD AUTO: 4.67 K/UL
NEUTROPHILS NFR BLD AUTO: 78.1 %
PLATELET # BLD AUTO: 224 K/UL
PMV BLD AUTO: 0 /100 WBCS
POTASSIUM SERPL-SCNC: 5 MMOL/L
PROT SERPL-MCNC: 7 G/DL
RBC # BLD: 3.44 M/UL
RBC # FLD: 15.1 %
SODIUM SERPL-SCNC: 137 MMOL/L
WBC # FLD AUTO: 5.98 K/UL

## 2024-12-18 PROCEDURE — 99214 OFFICE O/P EST MOD 30 MIN: CPT | Mod: 25

## 2024-12-18 PROCEDURE — 36415 COLL VENOUS BLD VENIPUNCTURE: CPT

## 2024-12-20 LAB — AFP-TM SERPL-MCNC: 8.9 NG/ML

## 2024-12-23 PROBLEM — R11.2 CHEMOTHERAPY-INDUCED NAUSEA AND VOMITING: Status: RESOLVED | Noted: 2024-07-17 | Resolved: 2024-12-23

## 2024-12-23 PROBLEM — K52.1 CHEMOTHERAPY-INDUCED DIARRHEA: Status: RESOLVED | Noted: 2024-07-17 | Resolved: 2024-12-23

## 2024-12-23 PROBLEM — R60.9 FLUID RETENTION: Status: RESOLVED | Noted: 2024-10-24 | Resolved: 2024-12-23

## 2025-01-02 ENCOUNTER — APPOINTMENT (OUTPATIENT)
Dept: TRANSPLANT | Facility: CLINIC | Age: 80
End: 2025-01-02
Payer: MEDICARE

## 2025-01-02 VITALS
TEMPERATURE: 98.1 F | DIASTOLIC BLOOD PRESSURE: 84 MMHG | HEART RATE: 69 BPM | WEIGHT: 156 LBS | HEIGHT: 68 IN | OXYGEN SATURATION: 97 % | SYSTOLIC BLOOD PRESSURE: 149 MMHG | BODY MASS INDEX: 23.64 KG/M2 | RESPIRATION RATE: 16 BRPM

## 2025-01-02 DIAGNOSIS — C22.1 INTRAHEPATIC BILE DUCT CARCINOMA: ICD-10-CM

## 2025-01-02 PROCEDURE — 99205 OFFICE O/P NEW HI 60 MIN: CPT

## 2025-01-10 ENCOUNTER — OUTPATIENT (OUTPATIENT)
Dept: OUTPATIENT SERVICES | Facility: HOSPITAL | Age: 80
LOS: 1 days | End: 2025-01-10
Payer: MEDICARE

## 2025-01-10 ENCOUNTER — APPOINTMENT (OUTPATIENT)
Dept: INFUSION THERAPY | Facility: CLINIC | Age: 80
End: 2025-01-10

## 2025-01-10 VITALS
WEIGHT: 156.09 LBS | HEIGHT: 68 IN | OXYGEN SATURATION: 99 % | SYSTOLIC BLOOD PRESSURE: 112 MMHG | HEART RATE: 72 BPM | RESPIRATION RATE: 18 BRPM | DIASTOLIC BLOOD PRESSURE: 74 MMHG | TEMPERATURE: 98 F

## 2025-01-10 DIAGNOSIS — C22.1 INTRAHEPATIC BILE DUCT CARCINOMA: ICD-10-CM

## 2025-01-10 LAB
T4 FREE SERPL-MCNC: 1.21 NG/DL — SIGNIFICANT CHANGE UP (ref 0.93–1.7)
TSH SERPL-MCNC: 0.97 UIU/ML — SIGNIFICANT CHANGE UP (ref 0.27–4.2)

## 2025-01-10 PROCEDURE — 84443 ASSAY THYROID STIM HORMONE: CPT

## 2025-01-10 PROCEDURE — 96413 CHEMO IV INFUSION 1 HR: CPT

## 2025-01-10 PROCEDURE — 36415 COLL VENOUS BLD VENIPUNCTURE: CPT

## 2025-01-10 PROCEDURE — 84439 ASSAY OF FREE THYROXINE: CPT

## 2025-01-10 RX ORDER — LIDOCAINE 50 MG/G
1 OINTMENT TOPICAL ONCE
Refills: 0 | Status: COMPLETED | OUTPATIENT
Start: 2025-01-10 | End: 2025-01-10

## 2025-01-10 RX ORDER — DURVALUMAB 500 MG/10ML
1500 INJECTION, SOLUTION INTRAVENOUS ONCE
Refills: 0 | Status: COMPLETED | OUTPATIENT
Start: 2025-01-10 | End: 2025-01-10

## 2025-01-10 RX ORDER — SODIUM CHLORIDE 9 MG/ML
10 INJECTION, SOLUTION INTRAMUSCULAR; INTRAVENOUS; SUBCUTANEOUS ONCE
Refills: 0 | Status: COMPLETED | OUTPATIENT
Start: 2025-01-10 | End: 2025-01-10

## 2025-01-10 RX ADMIN — DURVALUMAB 1500 MILLIGRAM(S): 500 INJECTION, SOLUTION INTRAVENOUS at 14:45

## 2025-01-10 RX ADMIN — DURVALUMAB 1500 MILLIGRAM(S): 500 INJECTION, SOLUTION INTRAVENOUS at 14:07

## 2025-01-10 RX ADMIN — SODIUM CHLORIDE 10 MILLILITER(S): 9 INJECTION, SOLUTION INTRAMUSCULAR; INTRAVENOUS; SUBCUTANEOUS at 14:59

## 2025-01-10 NOTE — PHARMACOTHERAPY INTERVENTION NOTE - COMMENTS
Sent message in [facility] teams chat that patient Rasheeda Cornejo is cleared for durvalumab today with CBCdiff/CMP from 12/18/2024 but is still due for TSHw/FT4. Asked that infusion nurse draw the lab.

## 2025-01-13 ENCOUNTER — APPOINTMENT (OUTPATIENT)
Dept: SURGICAL ONCOLOGY | Facility: CLINIC | Age: 80
End: 2025-01-13
Payer: MEDICARE

## 2025-01-13 VITALS
SYSTOLIC BLOOD PRESSURE: 128 MMHG | TEMPERATURE: 98.6 F | BODY MASS INDEX: 25.07 KG/M2 | HEIGHT: 66 IN | DIASTOLIC BLOOD PRESSURE: 79 MMHG | OXYGEN SATURATION: 99 % | HEART RATE: 95 BPM | RESPIRATION RATE: 16 BRPM | WEIGHT: 156 LBS

## 2025-01-13 DIAGNOSIS — C22.1 INTRAHEPATIC BILE DUCT CARCINOMA: ICD-10-CM

## 2025-01-13 PROCEDURE — 99214 OFFICE O/P EST MOD 30 MIN: CPT

## 2025-02-07 ENCOUNTER — APPOINTMENT (OUTPATIENT)
Dept: HEMATOLOGY ONCOLOGY | Facility: CLINIC | Age: 80
End: 2025-02-07
Payer: MEDICARE

## 2025-02-07 ENCOUNTER — APPOINTMENT (OUTPATIENT)
Dept: INFUSION THERAPY | Facility: CLINIC | Age: 80
End: 2025-02-07

## 2025-02-07 ENCOUNTER — OUTPATIENT (OUTPATIENT)
Dept: OUTPATIENT SERVICES | Facility: HOSPITAL | Age: 80
LOS: 1 days | End: 2025-02-07
Payer: MEDICARE

## 2025-02-07 VITALS
HEART RATE: 69 BPM | HEIGHT: 66 IN | DIASTOLIC BLOOD PRESSURE: 78 MMHG | TEMPERATURE: 97.8 F | WEIGHT: 158 LBS | OXYGEN SATURATION: 97 % | RESPIRATION RATE: 18 BRPM | BODY MASS INDEX: 25.39 KG/M2 | SYSTOLIC BLOOD PRESSURE: 112 MMHG

## 2025-02-07 VITALS
HEIGHT: 68 IN | OXYGEN SATURATION: 99 % | HEART RATE: 68 BPM | SYSTOLIC BLOOD PRESSURE: 132 MMHG | TEMPERATURE: 99 F | DIASTOLIC BLOOD PRESSURE: 84 MMHG | WEIGHT: 158.07 LBS | RESPIRATION RATE: 18 BRPM

## 2025-02-07 VITALS — HEIGHT: 68 IN | BODY MASS INDEX: 24.02 KG/M2

## 2025-02-07 DIAGNOSIS — T45.1X5A OTHER FATIGUE: ICD-10-CM

## 2025-02-07 DIAGNOSIS — R53.83 OTHER FATIGUE: ICD-10-CM

## 2025-02-07 DIAGNOSIS — C22.1 INTRAHEPATIC BILE DUCT CARCINOMA: ICD-10-CM

## 2025-02-07 DIAGNOSIS — Z87.898 PERSONAL HISTORY OF OTHER SPECIFIED CONDITIONS: ICD-10-CM

## 2025-02-07 LAB
ALBUMIN SERPL ELPH-MCNC: 3.6 G/DL
ALP BLD-CCNC: 205 U/L
ALT SERPL-CCNC: 23 U/L
ANION GAP SERPL CALC-SCNC: -2 MMOL/L
AST SERPL-CCNC: 33 U/L
BILIRUB SERPL-MCNC: 0.6 MG/DL
BUN SERPL-MCNC: 25 MG/DL
CALCIUM SERPL-MCNC: 10.5 MG/DL
CHLORIDE SERPL-SCNC: 112 MMOL/L
CO2 SERPL-SCNC: 27 MMOL/L
CREAT SERPL-MCNC: 0.8 MG/DL
EGFR: 76 ML/MIN/1.73M2
GLUCOSE SERPL-MCNC: 106 MG/DL
HCT VFR BLD CALC: 37.4 %
HGB BLD-MCNC: 12.3 G/DL
LYMPHOCYTES # BLD AUTO: 1 K/UL
LYMPHOCYTES NFR BLD AUTO: 17.3 %
MAN DIFF?: NO
MCHC RBC-ENTMCNC: 30 PG
MCHC RBC-ENTMCNC: 32.9 G/DL
MCV RBC AUTO: 91.2 FL
NEUTROPHILS # BLD AUTO: 4.3 K/UL
NEUTROPHILS NFR BLD AUTO: 72.6 %
PLATELET # BLD AUTO: 211 K/UL
POTASSIUM SERPL-SCNC: 4.9 MMOL/L
PROT SERPL-MCNC: 7.4 G/DL
RBC # BLD: 4.1 M/UL
RBC # FLD: 13.6 %
SODIUM SERPL-SCNC: 137 MMOL/L
WBC # FLD AUTO: 5.9 K/UL

## 2025-02-07 PROCEDURE — 96413 CHEMO IV INFUSION 1 HR: CPT

## 2025-02-07 PROCEDURE — 36415 COLL VENOUS BLD VENIPUNCTURE: CPT

## 2025-02-07 PROCEDURE — 99214 OFFICE O/P EST MOD 30 MIN: CPT | Mod: 25

## 2025-02-07 RX ORDER — BACTERIOSTATIC SODIUM CHLORIDE 0.9 %
10 VIAL (ML) INJECTION ONCE
Refills: 0 | Status: COMPLETED | OUTPATIENT
Start: 2025-02-07 | End: 2025-02-07

## 2025-02-07 RX ORDER — LIDOCAINE HYDROCHLORIDE 30 MG/G
1 CREAM TOPICAL ONCE
Refills: 0 | Status: COMPLETED | OUTPATIENT
Start: 2025-02-07 | End: 2025-02-07

## 2025-02-07 RX ORDER — DURVALUMAB 500 MG/10ML
1500 INJECTION, SOLUTION INTRAVENOUS ONCE
Refills: 0 | Status: COMPLETED | OUTPATIENT
Start: 2025-02-07 | End: 2025-02-07

## 2025-02-07 RX ADMIN — DURVALUMAB 1500 MILLIGRAM(S): 500 INJECTION, SOLUTION INTRAVENOUS at 12:21

## 2025-02-07 RX ADMIN — Medication 10 MILLILITER(S): at 13:38

## 2025-02-07 RX ADMIN — DURVALUMAB 1500 MILLIGRAM(S): 500 INJECTION, SOLUTION INTRAVENOUS at 13:22

## 2025-02-11 LAB — AFP-TM SERPL-MCNC: 13.3 NG/ML

## 2025-02-21 ENCOUNTER — APPOINTMENT (OUTPATIENT)
Dept: MRI IMAGING | Facility: HOSPITAL | Age: 80
End: 2025-02-21

## 2025-02-21 ENCOUNTER — OUTPATIENT (OUTPATIENT)
Dept: OUTPATIENT SERVICES | Facility: HOSPITAL | Age: 80
LOS: 1 days | End: 2025-02-21
Payer: MEDICARE

## 2025-02-21 PROCEDURE — 74183 MRI ABD W/O CNTR FLWD CNTR: CPT | Mod: 26

## 2025-02-21 PROCEDURE — A9585: CPT

## 2025-02-21 PROCEDURE — 74183 MRI ABD W/O CNTR FLWD CNTR: CPT

## 2025-03-03 ENCOUNTER — APPOINTMENT (OUTPATIENT)
Dept: SURGICAL ONCOLOGY | Facility: CLINIC | Age: 80
End: 2025-03-03
Payer: MEDICARE

## 2025-03-03 VITALS
DIASTOLIC BLOOD PRESSURE: 81 MMHG | RESPIRATION RATE: 16 BRPM | TEMPERATURE: 97.9 F | WEIGHT: 157 LBS | HEIGHT: 68 IN | BODY MASS INDEX: 23.79 KG/M2 | HEART RATE: 84 BPM | SYSTOLIC BLOOD PRESSURE: 152 MMHG | OXYGEN SATURATION: 98 %

## 2025-03-03 DIAGNOSIS — C22.1 INTRAHEPATIC BILE DUCT CARCINOMA: ICD-10-CM

## 2025-03-03 PROCEDURE — 99215 OFFICE O/P EST HI 40 MIN: CPT

## 2025-03-06 ENCOUNTER — NON-APPOINTMENT (OUTPATIENT)
Age: 80
End: 2025-03-06

## 2025-03-07 ENCOUNTER — OUTPATIENT (OUTPATIENT)
Dept: OUTPATIENT SERVICES | Facility: HOSPITAL | Age: 80
LOS: 1 days | End: 2025-03-07

## 2025-03-07 ENCOUNTER — APPOINTMENT (OUTPATIENT)
Dept: INFUSION THERAPY | Facility: CLINIC | Age: 80
End: 2025-03-07

## 2025-03-07 ENCOUNTER — OUTPATIENT (OUTPATIENT)
Dept: OUTPATIENT SERVICES | Facility: HOSPITAL | Age: 80
LOS: 1 days | End: 2025-03-07
Payer: MEDICARE

## 2025-03-07 ENCOUNTER — APPOINTMENT (OUTPATIENT)
Dept: HEMATOLOGY ONCOLOGY | Facility: CLINIC | Age: 80
End: 2025-03-07
Payer: MEDICARE

## 2025-03-07 ENCOUNTER — NON-APPOINTMENT (OUTPATIENT)
Age: 80
End: 2025-03-07

## 2025-03-07 VITALS
TEMPERATURE: 98 F | HEART RATE: 60 BPM | DIASTOLIC BLOOD PRESSURE: 68 MMHG | SYSTOLIC BLOOD PRESSURE: 117 MMHG | OXYGEN SATURATION: 99 % | RESPIRATION RATE: 17 BRPM

## 2025-03-07 VITALS
DIASTOLIC BLOOD PRESSURE: 74 MMHG | OXYGEN SATURATION: 98 % | TEMPERATURE: 99 F | HEART RATE: 67 BPM | HEIGHT: 68 IN | RESPIRATION RATE: 18 BRPM | SYSTOLIC BLOOD PRESSURE: 115 MMHG | WEIGHT: 158.07 LBS

## 2025-03-07 VITALS
SYSTOLIC BLOOD PRESSURE: 127 MMHG | WEIGHT: 158 LBS | HEIGHT: 68 IN | RESPIRATION RATE: 18 BRPM | BODY MASS INDEX: 23.95 KG/M2 | DIASTOLIC BLOOD PRESSURE: 72 MMHG | OXYGEN SATURATION: 97 % | TEMPERATURE: 97.4 F | HEART RATE: 77 BPM

## 2025-03-07 DIAGNOSIS — C22.1 INTRAHEPATIC BILE DUCT CARCINOMA: ICD-10-CM

## 2025-03-07 PROCEDURE — 36415 COLL VENOUS BLD VENIPUNCTURE: CPT

## 2025-03-07 PROCEDURE — 86901 BLOOD TYPING SEROLOGIC RH(D): CPT

## 2025-03-07 PROCEDURE — 99215 OFFICE O/P EST HI 40 MIN: CPT | Mod: 25

## 2025-03-07 PROCEDURE — 86900 BLOOD TYPING SEROLOGIC ABO: CPT

## 2025-03-07 PROCEDURE — 96413 CHEMO IV INFUSION 1 HR: CPT

## 2025-03-07 PROCEDURE — 86850 RBC ANTIBODY SCREEN: CPT

## 2025-03-07 RX ORDER — DURVALUMAB 500 MG/10ML
1500 INJECTION, SOLUTION INTRAVENOUS ONCE
Refills: 0 | Status: COMPLETED | OUTPATIENT
Start: 2025-03-07 | End: 2025-03-07

## 2025-03-07 RX ORDER — LIDOCAINE HYDROCHLORIDE 20 MG/ML
1 JELLY TOPICAL ONCE
Refills: 0 | Status: COMPLETED | OUTPATIENT
Start: 2025-03-07 | End: 2025-03-07

## 2025-03-07 RX ADMIN — Medication 10 MILLILITER(S): at 14:07

## 2025-03-07 RX ADMIN — DURVALUMAB 1500 MILLIGRAM(S): 500 INJECTION, SOLUTION INTRAVENOUS at 12:32

## 2025-03-07 RX ADMIN — DURVALUMAB 1500 MILLIGRAM(S): 500 INJECTION, SOLUTION INTRAVENOUS at 13:32

## 2025-03-07 NOTE — DISCHARGE INSTRUCTIONS: CHEMOTHERAPY - I ACKNOWLEDGE THAT I HAVE RECEIVED AND UNDERSTAND THE ABOVE INSTRUCTIONS AND AGREE TO BEING DISCHARGED TODAY
RAVINDER met with pt's son, who provided [facility] documents to be completed by MD. RAVINDER will inform MD of this information. Pt's son confirmed appeal was faxed. Pt's family, awaiting response. Statement Selected

## 2025-03-11 LAB
AFP-TM SERPL-MCNC: 26.8 NG/ML
ALBUMIN SERPL ELPH-MCNC: 3.7 G/DL
ALP BLD-CCNC: 201 U/L
ALT SERPL-CCNC: 16 U/L
ANION GAP SERPL CALC-SCNC: 0 MMOL/L
APTT BLD: 33.1 SEC
AST SERPL-CCNC: 30 U/L
BILIRUB SERPL-MCNC: 0.6 MG/DL
BUN SERPL-MCNC: 19 MG/DL
CALCIUM SERPL-MCNC: 10.2 MG/DL
CHLORIDE SERPL-SCNC: 108 MMOL/L
CO2 SERPL-SCNC: 28 MMOL/L
CREAT SERPL-MCNC: 0.7 MG/DL
EGFRCR SERPLBLD CKD-EPI 2021: 89 ML/MIN/1.73M2
GLUCOSE SERPL-MCNC: 114 MG/DL
HCT VFR BLD CALC: 39.6 %
HGB BLD-MCNC: 13 G/DL
INR PPP: 0.96
LYMPHOCYTES # BLD AUTO: 0.9 K/UL
LYMPHOCYTES NFR BLD AUTO: 12.9 %
MAN DIFF?: NO
MCHC RBC-ENTMCNC: 29.8 PG
MCHC RBC-ENTMCNC: 32.8 G/DL
MCV RBC AUTO: 90.8 FL
NEUTROPHILS # BLD AUTO: 5.6 K/UL
NEUTROPHILS NFR BLD AUTO: 80 %
PLATELET # BLD AUTO: 205 K/UL
POTASSIUM SERPL-SCNC: 5.2 MMOL/L
PROT SERPL-MCNC: 7.1 G/DL
PT BLD: 11 SEC
RBC # BLD: 4.36 M/UL
RBC # FLD: 13.1 %
SODIUM SERPL-SCNC: 136 MMOL/L
TSH SERPL-ACNC: 1.32 UIU/ML
WBC # FLD AUTO: 7 K/UL

## 2025-03-12 VITALS
HEART RATE: 66 BPM | HEIGHT: 68.5 IN | TEMPERATURE: 98 F | DIASTOLIC BLOOD PRESSURE: 79 MMHG | SYSTOLIC BLOOD PRESSURE: 124 MMHG | WEIGHT: 158.07 LBS | OXYGEN SATURATION: 95 % | RESPIRATION RATE: 17 BRPM

## 2025-03-12 NOTE — PRE-ANESTHESIA EVALUATION ADULT - NSANTHADDINFOFT_GEN_ALL_CORE
[Authored: 13-Mar-2025 05:31]Outpatient Clinical Summary - Medical  Group [Charted Location: [place] [address]]- for Visit: [id number],  [Entered by: Jaqueline RTDAVID (IS) 13-Mar-2025 05:31];  [Signed by: Provider, Outpatient (MD) 13-Mar-2025 05:31] General, Complete, Entered, Signed in Full, General      	[facility]		   Created Date 	03/13/2025 04:30AM		   Note 	This data is based on information in the electronic chart and is a snapshot as of Created Date.  Please note recent labs not yet reviewed by the ordering physician will not appear on this document.		  			   Patient Detail for 	AWA SPARKS 	MRN: 	[id number]  			   Contact 	AWA SPARKS 	Date of Birth: 	September 23,1945   Address 	[address] [place] [address]	Gender 	Female  	-		  	[place] [address]	Marital Status 	Single   Contact 	[phone](Home phone)	Allergies 	No  		Language 	  			  	Reason for Referral		  	-No Referral Information Charted in outpatient electronic record		  			  	History of Present Illness		  			  	Problems		  	-Normocytic anemia (285.9, D64.9) 03-Oct-2024 Status: Active		  	-Cholangiocarcinoma (155.1, C22.1) 06-Jun-2024 Status: Active		  	-Hypertension (401.9) 02-Apr-2024 Status: Active		  	-Asthma (493.90, J45.909) 02-Apr-2024 Status: Active		  	-Hyperlipidemia (272.4, E78.5) 02-Apr-2024 Status: Active		  			  	Medications		  	-Ondansetron 8 MG Oral Tablet DisintegratingTAKE 8 MG Every 8 hours as needed for nausea Quantity: 60 Refills: 5 Ordered: [id number] N.P., DANIEL A Start : [id number]-[id number]		  	-Loperamide HCl - 2 MG Oral CapsuleTAKE 2 CAPSULES AT 1ST DIARRHEAL BOWEL MOVEMENT, THEN TAKE 1 CAPSULE AT EACH ADDITIONAL BOWEL MOVEMENT. MAXIMUM 8 CAPSULES IN 24 HOURS. Quantity: 80 Refills: 4 Ordered: [id number] N.P., DANIEL A Start : [id number]		  	-LORazepam 0.5 MG Oral TabletTAKE 1 TABLET BY MOUTH DAILY AT BEDTIME AS NEEDED Quantity: 30 Refills: 0 Ordered: 22-Jul-2024 M.O.A. Start : [id number]		  	-Albuterol 90 MCG/ACT AERSINHALE 2 PUFFS 3 TIMES DAILY AS NEEDED. Quantity: 0 Refills: 0 Ordered: 2-Apr-2024 M.O.A. Active		  	-amLODIPine Besy-Benazepril HCl - 2.5-10 MG Oral Capsule Quantity: 0 Refills: 0 Ordered: 2-Apr-2024 M.O.A. Active		  	-Atenolol 25 MG Oral TabletTAKE 1 TABLET DAILY. Quantity: 0 Refills: 0 Ordered: 2-Apr-2024 M.O.A. Active		  	-Atorvastatin Calcium 10 MG Oral TabletTAKE 1 TABLET AT BEDTIME. Quantity: 0 Refills: 0 Ordered: 2-Apr-2024 M.O.A. Active		  	-Montelukast Sodium 10 MG Oral TabletTAKE 1 TABLET DAILY. Quantity: 0 Refills: 0 Ordered: 2-Apr-2024 M.O.A. Active		  	-PriLOSEC CPDR Quantity: 0 Refills: 0 Ordered: 6-Jun-2024 M.O.A. Active		  	-Loratadine TABS Quantity: 0 Refills: 0 Ordered: 6-Jun-2024 M.O.A. Active		  	-Vitamin B 12 TABS Quantity: 0 Refills: 0 Ordered: 8-Aug-2024 M.O.A. Active		  	-Vitamin D3 25 MCG (1000 UT) Oral Tablet Quantity: 0 Refills: 0 Ordered: 8-Aug-2024 M.O.A. Active		  	-Calcium 600 TABS Quantity: 0 Refills: 0 Ordered: 8-Aug-2024 M.O.A. Active		  	-Magnesium CAPS Quantity: 0 Refills: 0 Ordered: 8-Aug-2024 M.O.A. Active		  	-Zinc TABS Quantity: 0 Refills: 0 Ordered: 8-Aug-2024 M.O.A. Active		  			  	Allergies and Adverse Reactions		  	-No Allergy Information Charted in outpatient electronic record		  			  	Past Medical History		  	-History of ovarian cancer (V10.43 Z85.43) Status: Resolved		  	-History of gastroesophageal reflux (GERD) (V12.79 Z87.19) Status: Resolved		  	-History of Chemotherapy-induced fatigue (780.79 R53.83) Status: Resolved		  	-History of fever (V13.89 Z87.898) Status: Resolved		  	-History of Fluid retention (276.69 R60.9) Status: Resolved		  	-History of Chemotherapy-induced nausea and vomiting (787.01 R11.2) Status: Resolved		  	-History of Chemotherapy-induced diarrhea (787.91 K52.1) Status: Resolved		  			  	Procedures		  	-Procedure: Total Abdominal Hysterectomy With Removal Of Both Ovaries; Procedure Date: Not Available; Date Completed: Not Available; Status: Completed ;		  	-Procedure: Incisional hernia repair; Procedure Date: Not Available; Date Completed: Not Available; Status: Completed ;		  	-Procedure: Ovarian cystectomy; Procedure Date: Not Available; Date Completed: Not Available; Status: Completed ;		  	-Procedure: Cataract Surgery; Procedure Date: Not Available; Date Completed: Not Available; Status: Completed ;		  			  	Immunization		  	-No Immunization Information Charted in outpatient electronic record		  			  	Family History		  	-Family history of lymphoma: Father (V16.7, Z80.7) 02-Apr-2024 Status: Active 		  	-Family history of malignant neoplasm of breast: Mother (V16.3, Z80.3) 02-Apr-2024 Status: Active 		  	-Family history of leukemia: Sister (V16.6, Z80.6) 02-Apr-2024 Status: Active 		  	-Family history of arthritis: Sister (V17.7, Z82.61) 02-Apr-2024 Status: Active 		  	-Family history of hypertension: Sister (V17.49, Z82.49) 02-Apr-2024 Status: Active 		  			  	Social History		  	-Social alcohol use (Z78.9) 02-Apr-2024 Status: Active 		  			  	Vital Signs (All Vital Signs for last  30 days of patient encounters)		  Date	Description	Test	Result  2025-03-07		Body mass index (BMI) [Ratio]	24.02 kg/m2  			158   		Body height	68 [in_us]  		Body surface area Derived from formula	1.85 m2  		Systolic blood pressure	127 mm[Hg]  		Diastolic blood pressure	72 mm[Hg]  		Heart Rate	77 /min  		Respiratory rate	18 /min  			97   			0   		Body temperature	97.4 [degF]  2025-03-03		Systolic blood pressure	152 mm[Hg]  		Diastolic blood pressure	81 mm[Hg]  		Body mass index (BMI) [Ratio]	23.87 kg/m2  			157   		Body height	68 [in_us]  		Body surface area Derived from formula	1.84 m2  		Body temperature	97.9 [degF]  		Respiratory rate	16 /min  		Heart Rate	84 /min  			98   			0   			  	Results (All Results for last 30 days of patient encounters)		  28-Feb-2025	Thyroid Stimulating Hormone, Serum w/ FT4 Reflex	Interpretation	  		Reference	  		Status	Complete  		Test Name	[id number]   		Value	1.32  			  28-Feb-2025	Alpha Fetoprotein - Tumor Marker	Interpretation	  		Reference	  		Status	Complete  		Test Name	[id number]   		Value	26.8  			  28-Feb-2025	Comprehensive Metabolic Panel	Interpretation	  		Reference	  		Status	Complete  		Test Name	[id number]   		Value	89  			  28-Feb-2025	CBC with Auto Diff	Interpretation	  		Reference	  		Status	Complete  		Test Name	[id number]   		Value	0.90  			  07-Mar-2025	Activated Partial Thromboplastin Time	Interpretation	  		Reference	  		Status	Complete  		Test Name	[id number]   		Value	33.1  			  07-Mar-2025	Prothrombin Time and INR, Plasma	Interpretation	  		Reference	  		Status	Complete  		Test Name	[id number]   		Value	0.96  			  			  	Outstanding Orders		  	-No Treatment Plan Information Charted in outpatient electronic record		  			  	Advance Directives		  	-No Advance Directives Information Charted in outpatient electronic record		  			  	Healthcare Providers	Location 	Department   	FullName: [id number], GENERIC; Specialty:  	[facility]	AMB CHEMO AND INFUSIONS  	FullName: GRAHAM REDDY; Specialty:  	 [address] 	HEMATOLOGY ONCOLOGY  	FullName: PONCHO PHAN; Specialty:  	 [address] 	SURGICAL ONCOLOGY  	FullName: [id number], GENERIC; Specialty:  	[facility]	AMB CHEMO AND INFUSIONS  	FullName: PONCHO STEIN; Specialty:  	 [facility]	TRANSPLANT SURGERY  	FullName: DANIEL ROACH; Specialty:  	 [address]	HEMATOLOGY ONCOLOGY  	FullName: [id number], GENERIC; Specialty:  	[facility]	AMB CHEMO AND INFUSIONS  	FullName: [id number], GENERIC; Specialty:  	 [facility]	CAT SCAN OUTPATIENT  	FullName: FEDERICO MURDOCK; Specialty:  	 [facility]	INTRVNTNL RADIOLOGY OUTPATIENT  	FullName: [id number], GENERIC; Specialty:  	 [facility]	INTRVNTNL RADIOLOGY OUTPATIENT  	FullName: [id number], GENERIC; Specialty:  	DO [address]	MRI OUTPATIENT  			  	Patient Contacts

## 2025-03-12 NOTE — PATIENT PROFILE ADULT - FALL HARM RISK - HARM RISK INTERVENTIONS

## 2025-03-12 NOTE — PRE-ANESTHESIA EVALUATION ADULT - WEIGHT IN KG
76 YO Female, previous smoker, w/ PMHx of CAD, HTN, HLD, spinal stenosis, arthritis, hypothyroidism, bicuspid aortic valve s/p AVR, ascending/hemiarch replacement with frozen elephant trunk and left aorto-subclavian bypass, CABG x2 with Dr. 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 Muller and Dr. Augustin 7/29/22, with initiation of Keflex for concern over groin site infection, pt discharged to [facility] on 8/14/22. As an outpatient she was noted to have a small collection on her sternum that drained and self resolved. On 11/2/22 pt presented to [facility] ED for further work-up of sternal wound infection. On 11/2/22 pt underwent sternal I&D and wound vac placement with Dr. Muller and Dr. Vernon. On POD1 ID was consulted regarding abx regimen and need for PICC line, started on Vanco and Zosyn. Rec hold off on PICC until final ID recs. POD2 CM and SW on board for home wound vac and IV abx. POD3 Caspofungin added to regimen, wound vac changed. POD4 pt refusing VERNA. 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 Abx regimen changed per ID recs, vascular surgery consulted for r/o LUE limb ischemia, no surgical intervention at present.  11/9/22 ID changed cefepime to ertapenem. On 11/10: final ID recs in place, Scripts for home IV abx completed and scanned in by Case management she it to have a PICC placed today, possible DC home tomorrow.     Neurovascular:   -No delirium. Pain well controlled with current regimen.  -acetaminophen     Tablet .. 650 milliGRAM(s) every 6 hours PRN    Cardiovascular:   #Hx of CAD and bicuspid aortic valve s/p AVR, ascending/hemiarch replacement with frozen elephant trunk and left aorto-subclavian bypass, CABG x2 with Dr. Muller on 8/9/2021 course c/b poor wound healing s/p pec flap and closure on 9/29/21  -Cont Aspirin, statin, BB   -Cont Lasix and KCl (home meds)  #S/p TEVAR with R groin cutdown and femoral artery repair with Dr. Muller and Dr. Augustin 7/29/22   #Hx of HTN  -Cont Lopressor 50mg PO QD (home med)  -Cont Norvasc 2.5mg PO QD (home med)  -Cont Hydralazine 10mg PO Q8H (home med)  #Hx of HLD  -Cont Atorvastatin  #Hx of PPM placement  -Hemodynamically stable.   -Monitor: BP, HR, tele  HR: 81 (64 - 83)  BP: 95/61 (95/61 - 143/64)    Respiratory:   02 Sat = 98% on RA.  RR: 20 (15 - 20)  SpO2: 96% (94% - 97%)  -Wean to RA from for O2 Sat > 93%.  -Encourage Cough, deep breathing and Use of IS 10x / hr while awake.  -Chest PT 4xdaily  -Continue Symbicort (home medication)     GI:   Stable  -Continue famotidine    Tablet 20 milliGRAM(s) daily  -senna 2 Tablet(s) at bedtime  -PO DASH diet    Renal / :   BUN/Cr Stable 18/1.0  -Continue to monitor I/O's.    Endocrine:    Blood sugar stable   A1C: 5.5  TSH: 5.220    Hematologic:  H/H stable 8.9/27.5  -DVT prophylaxis with Heparin gtt    ID:  #sternal wound infection   -final ID recs: Daptomycin 500 mg IVPB Q24H,Ertapenem 1g IVPB Q24H, Fluconazole 400 mg QD PO. She is to have weekly lab draws of CBC/CMP/ESR/CRP/CK faxed to ID at [phone]  -all scripts are completed and submitted by Case management   -Pending PICC in AM, IR aware  -Afebrile  -Continue to observe for SIRS/Sepsis Syndrome.    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 No revascularization recommended at this time due to prior surgeries and current infection. She is to f/u with Vascular surgery as an outpatient for future surgical planning  -Per vascular team, if limb begins to lose sensation or decrease in motor ability, contact vascular surgery immediately   -Cont hep gtt, no surgical intervention recommended at present  -Re-start home Eliquis as indicated (on hep gtt until picc line placed)    Disposition:  -PICC tomorrow morning, possible DC home    71.7

## 2025-03-12 NOTE — ASU PATIENT PROFILE, ADULT - FALL HARM RISK - HARM RISK INTERVENTIONS

## 2025-03-12 NOTE — ASU PATIENT PROFILE, ADULT - NSICDXPASTMEDICALHX_GEN_ALL_CORE_FT
PAST MEDICAL HISTORY:  Asthma     Cholangiocarcinoma     Hyperlipidemia     Hypertension     Ovarian cancer s/p chemo    Vestibular neuritis

## 2025-03-12 NOTE — PATIENT PROFILE ADULT - NSPROGENBLOODRESTRICT_GEN_A_NUR
· Patient was evaluated by PCP secondary to dizziness and fatigue and has blood pressure done as an outpatient    His hemoglobin was found to be 6 2 and was sent to the emergency room  · On the day of admission hemoglobin in the emergency room was 7 1 and received 2 units of packed RBCs  · HGB stable since none

## 2025-03-13 ENCOUNTER — OUTPATIENT (OUTPATIENT)
Dept: INPATIENT UNIT | Facility: HOSPITAL | Age: 80
LOS: 1 days | Discharge: HOME | End: 2025-03-13
Payer: COMMERCIAL

## 2025-03-13 ENCOUNTER — APPOINTMENT (OUTPATIENT)
Dept: SURGICAL ONCOLOGY | Facility: HOSPITAL | Age: 80
End: 2025-03-13

## 2025-03-13 ENCOUNTER — RESULT REVIEW (OUTPATIENT)
Age: 80
End: 2025-03-13

## 2025-03-13 ENCOUNTER — TRANSCRIPTION ENCOUNTER (OUTPATIENT)
Age: 80
End: 2025-03-13

## 2025-03-13 VITALS
RESPIRATION RATE: 20 BRPM | SYSTOLIC BLOOD PRESSURE: 131 MMHG | OXYGEN SATURATION: 100 % | DIASTOLIC BLOOD PRESSURE: 65 MMHG | HEART RATE: 78 BPM

## 2025-03-13 DIAGNOSIS — Z98.890 OTHER SPECIFIED POSTPROCEDURAL STATES: Chronic | ICD-10-CM

## 2025-03-13 DIAGNOSIS — Z98.49 CATARACT EXTRACTION STATUS, UNSPECIFIED EYE: Chronic | ICD-10-CM

## 2025-03-13 DIAGNOSIS — Z90.710 ACQUIRED ABSENCE OF BOTH CERVIX AND UTERUS: Chronic | ICD-10-CM

## 2025-03-13 PROCEDURE — 88342 IMHCHEM/IMCYTCHM 1ST ANTB: CPT

## 2025-03-13 PROCEDURE — 88341 IMHCHEM/IMCYTCHM EA ADD ANTB: CPT | Mod: 26

## 2025-03-13 PROCEDURE — 88342 IMHCHEM/IMCYTCHM 1ST ANTB: CPT | Mod: 26

## 2025-03-13 PROCEDURE — 88360 TUMOR IMMUNOHISTOCHEM/MANUAL: CPT

## 2025-03-13 PROCEDURE — 88305 TISSUE EXAM BY PATHOLOGIST: CPT

## 2025-03-13 PROCEDURE — 88305 TISSUE EXAM BY PATHOLOGIST: CPT | Mod: 26

## 2025-03-13 PROCEDURE — 88341 IMHCHEM/IMCYTCHM EA ADD ANTB: CPT

## 2025-03-13 PROCEDURE — 49321 LAPAROSCOPY BIOPSY: CPT

## 2025-03-13 PROCEDURE — 49321 LAPAROSCOPY BIOPSY: CPT | Mod: AS

## 2025-03-13 PROCEDURE — 88360 TUMOR IMMUNOHISTOCHEM/MANUAL: CPT | Mod: 26,59

## 2025-03-13 DEVICE — SURGCEL 4 X 8": Type: IMPLANTABLE DEVICE | Status: FUNCTIONAL

## 2025-03-13 DEVICE — SURGIFLO HEMOSTATIC MATRIX KIT: Type: IMPLANTABLE DEVICE | Status: FUNCTIONAL

## 2025-03-13 DEVICE — SURGICEL FIBRILLAR 1 X 2": Type: IMPLANTABLE DEVICE | Status: FUNCTIONAL

## 2025-03-13 DEVICE — LIGATING CLIPS WECK HEMOLOK POLYMER LARGE (PURPLE) 6: Type: IMPLANTABLE DEVICE | Status: FUNCTIONAL

## 2025-03-13 DEVICE — LIGATING CLIPS WECK HEMOLOK POLYMER MEDIUM-LARGE (GREEN) 6: Type: IMPLANTABLE DEVICE | Status: FUNCTIONAL

## 2025-03-13 RX ORDER — SCOPOLAMINE 1 MG/3D
1 PATCH, EXTENDED RELEASE TRANSDERMAL ONCE
Refills: 0 | Status: COMPLETED | OUTPATIENT
Start: 2025-03-13 | End: 2025-03-13

## 2025-03-13 RX ORDER — ACETAMINOPHEN 500 MG/5ML
650 LIQUID (ML) ORAL EVERY 6 HOURS
Refills: 0 | Status: DISCONTINUED | OUTPATIENT
Start: 2025-03-13 | End: 2025-03-13

## 2025-03-13 RX ORDER — ONDANSETRON HCL/PF 4 MG/2 ML
4 VIAL (ML) INJECTION EVERY 6 HOURS
Refills: 0 | Status: DISCONTINUED | OUTPATIENT
Start: 2025-03-13 | End: 2025-03-13

## 2025-03-13 RX ORDER — ALBUTEROL SULFATE 2.5 MG/3ML
2 VIAL, NEBULIZER (ML) INHALATION
Refills: 0 | DISCHARGE

## 2025-03-13 RX ORDER — APREPITANT 40 MG/1
40 CAPSULE ORAL ONCE
Refills: 0 | Status: COMPLETED | OUTPATIENT
Start: 2025-03-13 | End: 2025-03-13

## 2025-03-13 RX ORDER — LORAZEPAM 4 MG/ML
1 VIAL (ML) INJECTION
Refills: 0 | DISCHARGE

## 2025-03-13 RX ORDER — TRAMADOL HYDROCHLORIDE AND ACETAMINOPHEN 37.5; 325 MG/1; MG/1
1 TABLET ORAL
Qty: 10 | Refills: 0
Start: 2025-03-13

## 2025-03-13 RX ADMIN — SCOPOLAMINE 1 PATCH: 1 PATCH, EXTENDED RELEASE TRANSDERMAL at 07:47

## 2025-03-13 RX ADMIN — APREPITANT 40 MILLIGRAM(S): 40 CAPSULE ORAL at 07:47

## 2025-03-13 NOTE — BRIEF OPERATIVE NOTE - OPERATION/FINDINGS
peritoneal implants noted on right diaphragm, left diaphragm at the level of her abdominal mesh. No implants in pelvis or lower abdomen. Sent frozen of left peritoneal implant which was positive for carcinoma. We closed with 12mm port with 0 vicryl endoclose and the skin with 4-0 monocryl and dermabond. Fitzgerald was removed

## 2025-03-13 NOTE — ASU DISCHARGE PLAN (ADULT/PEDIATRIC) - CARE PROVIDER_API CALL
Moreno Loredo  Surgical Oncology  [address] [address]  Phone: [phone]  Fax: [phone]  Follow Up Time: 2 weeks

## 2025-03-13 NOTE — ASU DISCHARGE PLAN (ADULT/PEDIATRIC) - ASU DC SPECIAL INSTRUCTIONSFT
General Discharge Instructions:  Please resume all regular home medications unless specifically advised not to take a particular medication. Also, please take any new medications as prescribed.  Please get plenty of rest, continue to ambulate several times per day, and drink adequate amounts of fluids. Avoid lifting weights greater than 5-10 lbs until you follow-up with your surgeon, who will instruct you further regarding activity restrictions.  Avoid driving or operating heavy machinery while taking pain medications.  Please follow-up with your surgeon and Primary Care Provider (PCP) as advised.  Incision Care:  *Please call your doctor or nurse practitioner if you have increased pain, swelling, redness, or drainage from the incision site.  *Avoid swimming and baths until your follow-up appointment.  *You may shower, and wash surgical incisions with a mild soap and warm water. Gently pat the area dry.  *If you have staples, they will be removed at your follow-up appointment.  *If you have steri-strips, they will fall off on their own. Please remove any remaining strips 7-10 days after surgery.    Warning Signs:  Please call your doctor or nurse practitioner if you experience the following:  *You experience new chest pain, pressure, squeezing or tightness.  *New or worsening cough, shortness of breath, or wheeze.  *If you are vomiting and cannot keep down fluids or your medications.  *You are getting dehydrated due to continued vomiting, diarrhea, or other reasons. Signs of dehydration include dry mouth, rapid heartbeat, or feeling dizzy or faint when standing.  *You see blood or dark/black material when you vomit or have a bowel movement.  *You experience burning when you urinate, have blood in your urine, or experience a discharge.  *Your pain is not improving within 8-12 hours or is not gone within 24 hours. Call or return immediately if your pain is getting worse, changes location, or moves to your chest or back.  *You have shaking chills, or fever greater than 101.5 degrees Fahrenheit or 38 degrees Celsius.  *Any change in your symptoms, or any new symptoms that concern you.

## 2025-03-13 NOTE — PACU DISCHARGE NOTE - THE ANESTHESIA ORDERS USED IN THE PACU ORDER SET WILL BE DISCONTINUED UPON TRANSFER OF THIS PATIENT
SAFETY PLAN    A suicide Safety Plan is a document that supports someone when they are having thoughts of suicide. Warning Signs that indicate a suicidal crisis may be developing: What (situations, thoughts, feelings, body sensations, behaviors, etc.) do you experience that lets you know you are beginning to think about suicide? 1. Blackout  2. Run away  3. Shaking    Internal Coping Strategies:  What things can I do (relaxation techniques, hobbies, physical activities, etc.) to take my mind off my problems without contacting another person? 1. Breathing Exercises  2.   3.     People and social settings that provide distraction: Who can I call or where can I go to distract me? 1. Name: Friends  Phone:   2. Name: Coworkers  Phone:    3. Place:             4. Place:     People whom I can ask for help: Who can I call when I need help - for example, friends, family, clergy, someone else? 1. Name: Rocío Payne                Phone: [phone]  2. Name: Molly Julissa  Phone: [phone]  3. Name: Liavicenta Bartonara  Phone: [phone]    Professionals or [address] [place], [place] [address]vd I can contact during a crisis: Who can I call for help - for example, my doctor, my psychiatrist, my psychologist, a mental health provider, a suicide hotline? 1. Clinician Name: [id number]   Phone: [phone]      Clinician Pager or Emergency Contact #: [phone]    2. Clinician Name: [address]   Phone: [phone]      Clinician Pager or Emergency Contact #: [phone]    3. Suicide Prevention Lifeline: 7-061-098-TALK (4045)    4. [address] [place] [address] Emergency Services -  for example, [facility], [facility] suicide hotline, [facility] Hotline:       Emergency Services Address: [address], [place] [address]      Emergency Services Phone: [phone]    Making the environment safe: How can I make my environment (house/apartment/living space) safer?  For example, can I remove guns, medications, and other items? 1. Remove weapons from the home  2.  Remove extra medications from the home Statement Selected

## 2025-03-13 NOTE — ASU DISCHARGE PLAN (ADULT/PEDIATRIC) - FINANCIAL ASSISTANCE
[facility] provides services at a reduced cost to those who are determined to be eligible through [facility]’s financial assistance program. Information regarding [facility]’s financial assistance program can be found by going to [email or web address] or by calling [phone].

## 2025-03-14 PROBLEM — J45.909 UNSPECIFIED ASTHMA, UNCOMPLICATED: Chronic | Status: ACTIVE | Noted: 2025-03-12

## 2025-03-25 ENCOUNTER — APPOINTMENT (OUTPATIENT)
Dept: SURGICAL ONCOLOGY | Facility: CLINIC | Age: 80
End: 2025-03-25

## 2025-03-25 ENCOUNTER — APPOINTMENT (OUTPATIENT)
Dept: HEMATOLOGY ONCOLOGY | Facility: CLINIC | Age: 80
End: 2025-03-25
Payer: MEDICARE

## 2025-03-25 VITALS
RESPIRATION RATE: 16 BRPM | BODY MASS INDEX: 23.64 KG/M2 | DIASTOLIC BLOOD PRESSURE: 85 MMHG | SYSTOLIC BLOOD PRESSURE: 130 MMHG | HEART RATE: 98 BPM | OXYGEN SATURATION: 99 % | WEIGHT: 156 LBS | TEMPERATURE: 97.6 F | HEIGHT: 68 IN

## 2025-03-25 DIAGNOSIS — C22.1 INTRAHEPATIC BILE DUCT CARCINOMA: ICD-10-CM

## 2025-03-25 DIAGNOSIS — Z88.5 ALLERGY STATUS TO NARCOTIC AGENT: ICD-10-CM

## 2025-03-25 DIAGNOSIS — C78.6 SECONDARY MALIGNANT NEOPLASM OF RETROPERITONEUM AND PERITONEUM: ICD-10-CM

## 2025-03-25 DIAGNOSIS — K21.9 GASTRO-ESOPHAGEAL REFLUX DISEASE WITHOUT ESOPHAGITIS: ICD-10-CM

## 2025-03-25 DIAGNOSIS — J45.909 UNSPECIFIED ASTHMA, UNCOMPLICATED: ICD-10-CM

## 2025-03-25 DIAGNOSIS — E78.5 HYPERLIPIDEMIA, UNSPECIFIED: ICD-10-CM

## 2025-03-25 DIAGNOSIS — Z92.21 PERSONAL HISTORY OF ANTINEOPLASTIC CHEMOTHERAPY: ICD-10-CM

## 2025-03-25 DIAGNOSIS — I10 ESSENTIAL (PRIMARY) HYPERTENSION: ICD-10-CM

## 2025-03-25 PROCEDURE — 99024 POSTOP FOLLOW-UP VISIT: CPT

## 2025-03-25 PROCEDURE — G2211 COMPLEX E/M VISIT ADD ON: CPT

## 2025-03-25 PROCEDURE — 99214 OFFICE O/P EST MOD 30 MIN: CPT

## 2025-04-04 ENCOUNTER — APPOINTMENT (OUTPATIENT)
Dept: HEMATOLOGY ONCOLOGY | Facility: CLINIC | Age: 80
End: 2025-04-04

## 2025-04-04 ENCOUNTER — APPOINTMENT (OUTPATIENT)
Dept: INFUSION THERAPY | Facility: CLINIC | Age: 80
End: 2025-04-04

## 2025-04-15 ENCOUNTER — APPOINTMENT (OUTPATIENT)
Dept: HEMATOLOGY ONCOLOGY | Facility: CLINIC | Age: 80
End: 2025-04-15
Payer: MEDICARE

## 2025-04-15 VITALS
SYSTOLIC BLOOD PRESSURE: 126 MMHG | WEIGHT: 157 LBS | HEART RATE: 100 BPM | HEIGHT: 68 IN | TEMPERATURE: 97.9 F | OXYGEN SATURATION: 98 % | RESPIRATION RATE: 17 BRPM | DIASTOLIC BLOOD PRESSURE: 82 MMHG | BODY MASS INDEX: 23.79 KG/M2

## 2025-04-15 PROCEDURE — 99214 OFFICE O/P EST MOD 30 MIN: CPT

## 2025-04-15 PROCEDURE — G2211 COMPLEX E/M VISIT ADD ON: CPT

## 2025-05-02 ENCOUNTER — APPOINTMENT (OUTPATIENT)
Dept: INFUSION THERAPY | Facility: CLINIC | Age: 80
End: 2025-05-02

## 2025-05-05 ENCOUNTER — APPOINTMENT (OUTPATIENT)
Dept: PALLIATIVE MEDICINE | Facility: CLINIC | Age: 80
End: 2025-05-05
Payer: MEDICARE

## 2025-05-05 VITALS
DIASTOLIC BLOOD PRESSURE: 85 MMHG | TEMPERATURE: 98.5 F | RESPIRATION RATE: 18 BRPM | HEART RATE: 83 BPM | OXYGEN SATURATION: 98 % | SYSTOLIC BLOOD PRESSURE: 147 MMHG | HEIGHT: 68 IN | BODY MASS INDEX: 24.1 KG/M2 | WEIGHT: 159 LBS

## 2025-05-05 DIAGNOSIS — C22.1 INTRAHEPATIC BILE DUCT CARCINOMA: ICD-10-CM

## 2025-05-05 DIAGNOSIS — Z51.5 ENCOUNTER FOR PALLIATIVE CARE: ICD-10-CM

## 2025-05-05 DIAGNOSIS — Z71.89 OTHER SPECIFIED COUNSELING: ICD-10-CM

## 2025-05-05 PROCEDURE — 99213 OFFICE O/P EST LOW 20 MIN: CPT

## 2025-05-05 PROCEDURE — 99498 ADVNCD CARE PLAN ADDL 30 MIN: CPT | Mod: 25

## 2025-05-05 PROCEDURE — 99497 ADVNCD CARE PLAN 30 MIN: CPT | Mod: 25

## 2025-05-06 PROBLEM — Z71.89 ADVANCED CARE PLANNING/COUNSELING DISCUSSION: Status: ACTIVE | Noted: 2025-05-06

## 2025-05-06 PROBLEM — Z51.5 ENCOUNTER FOR PALLIATIVE CARE: Status: ACTIVE | Noted: 2025-05-06

## 2025-05-09 ENCOUNTER — NON-APPOINTMENT (OUTPATIENT)
Age: 80
End: 2025-05-09

## 2025-05-12 ENCOUNTER — NON-APPOINTMENT (OUTPATIENT)
Age: 80
End: 2025-05-12

## 2025-05-13 ENCOUNTER — NON-APPOINTMENT (OUTPATIENT)
Age: 80
End: 2025-05-13

## 2025-06-02 ENCOUNTER — APPOINTMENT (OUTPATIENT)
Dept: PALLIATIVE MEDICINE | Facility: CLINIC | Age: 80
End: 2025-06-02
Payer: MEDICARE

## 2025-06-02 VITALS
RESPIRATION RATE: 18 BRPM | SYSTOLIC BLOOD PRESSURE: 116 MMHG | OXYGEN SATURATION: 97 % | HEART RATE: 83 BPM | DIASTOLIC BLOOD PRESSURE: 81 MMHG | BODY MASS INDEX: 24.4 KG/M2 | TEMPERATURE: 97.5 F | HEIGHT: 68 IN | WEIGHT: 161 LBS

## 2025-06-02 DIAGNOSIS — F32.A ANXIETY DISORDER, UNSPECIFIED: ICD-10-CM

## 2025-06-02 DIAGNOSIS — Z71.89 OTHER SPECIFIED COUNSELING: ICD-10-CM

## 2025-06-02 DIAGNOSIS — F41.9 ANXIETY DISORDER, UNSPECIFIED: ICD-10-CM

## 2025-06-02 DIAGNOSIS — Z51.5 ENCOUNTER FOR PALLIATIVE CARE: ICD-10-CM

## 2025-06-02 PROCEDURE — 99215 OFFICE O/P EST HI 40 MIN: CPT

## 2025-06-02 PROCEDURE — 99497 ADVNCD CARE PLAN 30 MIN: CPT | Mod: 25

## 2025-06-03 ENCOUNTER — OUTPATIENT (OUTPATIENT)
Dept: OUTPATIENT SERVICES | Facility: HOSPITAL | Age: 80
LOS: 1 days | End: 2025-06-03
Payer: MEDICARE

## 2025-06-03 ENCOUNTER — APPOINTMENT (OUTPATIENT)
Dept: MRI IMAGING | Facility: HOSPITAL | Age: 80
End: 2025-06-03

## 2025-06-03 DIAGNOSIS — Z98.49 CATARACT EXTRACTION STATUS, UNSPECIFIED EYE: Chronic | ICD-10-CM

## 2025-06-03 DIAGNOSIS — Z98.890 OTHER SPECIFIED POSTPROCEDURAL STATES: Chronic | ICD-10-CM

## 2025-06-03 DIAGNOSIS — Z90.710 ACQUIRED ABSENCE OF BOTH CERVIX AND UTERUS: Chronic | ICD-10-CM

## 2025-06-03 PROCEDURE — 74183 MRI ABD W/O CNTR FLWD CNTR: CPT

## 2025-06-03 PROCEDURE — A9585: CPT

## 2025-06-03 PROCEDURE — 74183 MRI ABD W/O CNTR FLWD CNTR: CPT | Mod: 26

## 2025-06-05 ENCOUNTER — APPOINTMENT (OUTPATIENT)
Dept: PSYCHIATRY | Facility: CLINIC | Age: 80
End: 2025-06-05
Payer: MEDICARE

## 2025-06-05 PROCEDURE — 90791 PSYCH DIAGNOSTIC EVALUATION: CPT

## 2025-06-11 ENCOUNTER — NON-APPOINTMENT (OUTPATIENT)
Age: 80
End: 2025-06-11

## 2025-06-11 ENCOUNTER — APPOINTMENT (OUTPATIENT)
Dept: HEMATOLOGY ONCOLOGY | Facility: CLINIC | Age: 80
End: 2025-06-11
Payer: MEDICARE

## 2025-06-11 VITALS
RESPIRATION RATE: 18 BRPM | HEART RATE: 75 BPM | WEIGHT: 159 LBS | DIASTOLIC BLOOD PRESSURE: 73 MMHG | HEIGHT: 68 IN | TEMPERATURE: 97.8 F | SYSTOLIC BLOOD PRESSURE: 116 MMHG | BODY MASS INDEX: 24.1 KG/M2 | OXYGEN SATURATION: 99 %

## 2025-06-11 LAB
ALBUMIN SERPL ELPH-MCNC: 3.6 G/DL
ALP BLD-CCNC: 104 U/L
ALT SERPL-CCNC: 12 U/L
ANION GAP SERPL CALC-SCNC: 4 MMOL/L
AST SERPL-CCNC: 25 U/L
BILIRUB SERPL-MCNC: 0.7 MG/DL
BUN SERPL-MCNC: 17 MG/DL
CALCIUM SERPL-MCNC: 9.9 MG/DL
CHLORIDE SERPL-SCNC: 102 MMOL/L
CO2 SERPL-SCNC: 29 MMOL/L
CREAT SERPL-MCNC: 0.9 MG/DL
EGFRCR SERPLBLD CKD-EPI 2021: 66 ML/MIN/1.73M2
GLUCOSE SERPL-MCNC: 111 MG/DL
HCT VFR BLD CALC: 37.3 %
HGB BLD-MCNC: 12.7 G/DL
LYMPHOCYTES # BLD AUTO: 0.7 K/UL
LYMPHOCYTES NFR BLD AUTO: 8.6 %
MAN DIFF?: NO
MCHC RBC-ENTMCNC: 31.1 PG
MCHC RBC-ENTMCNC: 34 G/DL
MCV RBC AUTO: 91.2 FL
NEUTROPHILS # BLD AUTO: 7.4 K/UL
NEUTROPHILS NFR BLD AUTO: 86.5 %
PLATELET # BLD AUTO: 205 K/UL
POTASSIUM SERPL-SCNC: 4.9 MMOL/L
PROT SERPL-MCNC: 6.3 G/DL
RBC # BLD: 4.09 M/UL
RBC # FLD: 12.4 %
SODIUM SERPL-SCNC: 135 MMOL/L
WBC # FLD AUTO: 8.5 K/UL

## 2025-06-11 PROCEDURE — 36415 COLL VENOUS BLD VENIPUNCTURE: CPT

## 2025-06-11 PROCEDURE — 99214 OFFICE O/P EST MOD 30 MIN: CPT | Mod: 25

## 2025-06-11 RX ORDER — ESCITALOPRAM OXALATE 5 MG/1
5 TABLET, FILM COATED ORAL
Refills: 0 | Status: ACTIVE | COMMUNITY

## 2025-06-17 LAB
AFP-TM SERPL-MCNC: 153 NG/ML
CANCER AG19-9 SERPL-ACNC: <2 U/ML

## 2025-06-30 ENCOUNTER — APPOINTMENT (OUTPATIENT)
Dept: PALLIATIVE MEDICINE | Facility: CLINIC | Age: 80
End: 2025-06-30
Payer: MEDICARE

## 2025-06-30 VITALS
HEART RATE: 78 BPM | RESPIRATION RATE: 18 BRPM | BODY MASS INDEX: 25.46 KG/M2 | TEMPERATURE: 98.6 F | HEIGHT: 68 IN | OXYGEN SATURATION: 97 % | SYSTOLIC BLOOD PRESSURE: 127 MMHG | DIASTOLIC BLOOD PRESSURE: 79 MMHG | WEIGHT: 168 LBS

## 2025-06-30 PROCEDURE — 99215 OFFICE O/P EST HI 40 MIN: CPT

## 2025-07-07 ENCOUNTER — NON-APPOINTMENT (OUTPATIENT)
Age: 80
End: 2025-07-07

## 2025-07-08 ENCOUNTER — RESULT REVIEW (OUTPATIENT)
Age: 80
End: 2025-07-08

## 2025-07-08 ENCOUNTER — OUTPATIENT (OUTPATIENT)
Dept: OUTPATIENT SERVICES | Facility: HOSPITAL | Age: 80
LOS: 1 days | End: 2025-07-08
Payer: MEDICARE

## 2025-07-08 ENCOUNTER — APPOINTMENT (OUTPATIENT)
Dept: INTERVENTIONAL RADIOLOGY/VASCULAR | Facility: HOSPITAL | Age: 80
End: 2025-07-08

## 2025-07-08 DIAGNOSIS — Z98.890 OTHER SPECIFIED POSTPROCEDURAL STATES: Chronic | ICD-10-CM

## 2025-07-08 DIAGNOSIS — Z90.710 ACQUIRED ABSENCE OF BOTH CERVIX AND UTERUS: Chronic | ICD-10-CM

## 2025-07-08 DIAGNOSIS — Z98.49 CATARACT EXTRACTION STATUS, UNSPECIFIED EYE: Chronic | ICD-10-CM

## 2025-07-08 PROCEDURE — 49083 ABD PARACENTESIS W/IMAGING: CPT

## 2025-07-08 PROCEDURE — 88305 TISSUE EXAM BY PATHOLOGIST: CPT

## 2025-07-08 PROCEDURE — 88341 IMHCHEM/IMCYTCHM EA ADD ANTB: CPT | Mod: 26

## 2025-07-08 PROCEDURE — 88342 IMHCHEM/IMCYTCHM 1ST ANTB: CPT | Mod: 26

## 2025-07-08 PROCEDURE — 87015 SPECIMEN INFECT AGNT CONCNTJ: CPT

## 2025-07-08 PROCEDURE — 82945 GLUCOSE OTHER FLUID: CPT

## 2025-07-08 PROCEDURE — 82042 OTHER SOURCE ALBUMIN QUAN EA: CPT

## 2025-07-08 PROCEDURE — 88112 CYTOPATH CELL ENHANCE TECH: CPT | Mod: 26

## 2025-07-08 PROCEDURE — 87205 SMEAR GRAM STAIN: CPT

## 2025-07-08 PROCEDURE — 88112 CYTOPATH CELL ENHANCE TECH: CPT

## 2025-07-08 PROCEDURE — 84157 ASSAY OF PROTEIN OTHER: CPT

## 2025-07-08 PROCEDURE — 87070 CULTURE OTHR SPECIMN AEROBIC: CPT

## 2025-07-08 PROCEDURE — 88305 TISSUE EXAM BY PATHOLOGIST: CPT | Mod: 26

## 2025-07-08 PROCEDURE — 88342 IMHCHEM/IMCYTCHM 1ST ANTB: CPT

## 2025-07-08 PROCEDURE — 87075 CULTR BACTERIA EXCEPT BLOOD: CPT

## 2025-07-08 PROCEDURE — 89051 BODY FLUID CELL COUNT: CPT

## 2025-07-08 PROCEDURE — 88341 IMHCHEM/IMCYTCHM EA ADD ANTB: CPT

## 2025-07-08 PROCEDURE — 83615 LACTATE (LD) (LDH) ENZYME: CPT

## 2025-07-16 ENCOUNTER — APPOINTMENT (OUTPATIENT)
Dept: PALLIATIVE MEDICINE | Facility: CLINIC | Age: 80
End: 2025-07-16

## 2025-07-16 VITALS
OXYGEN SATURATION: 95 % | DIASTOLIC BLOOD PRESSURE: 66 MMHG | RESPIRATION RATE: 18 BRPM | SYSTOLIC BLOOD PRESSURE: 97 MMHG | BODY MASS INDEX: 25.16 KG/M2 | TEMPERATURE: 98.1 F | HEIGHT: 68 IN | HEART RATE: 81 BPM | WEIGHT: 166 LBS

## 2025-07-16 PROBLEM — K21.9 GASTROESOPHAGEAL REFLUX DISEASE: Status: ACTIVE | Noted: 2024-04-17

## 2025-07-16 PROCEDURE — 99215 OFFICE O/P EST HI 40 MIN: CPT

## 2025-07-16 RX ORDER — PANTOPRAZOLE 40 MG/1
40 TABLET, DELAYED RELEASE ORAL
Qty: 30 | Refills: 5 | Status: ACTIVE | COMMUNITY
Start: 2025-07-16 | End: 1900-01-01

## 2025-07-16 RX ORDER — METOCLOPRAMIDE 10 MG/1
10 TABLET ORAL
Qty: 21 | Refills: 5 | Status: ACTIVE | COMMUNITY
Start: 2025-07-16 | End: 1900-01-01

## 2025-07-17 ENCOUNTER — APPOINTMENT (OUTPATIENT)
Dept: PSYCHIATRY | Facility: CLINIC | Age: 80
End: 2025-07-17

## 2025-07-17 PROCEDURE — 90837 PSYTX W PT 60 MINUTES: CPT | Mod: 95

## 2025-07-24 ENCOUNTER — APPOINTMENT (OUTPATIENT)
Dept: PSYCHIATRY | Facility: CLINIC | Age: 80
End: 2025-07-24
Payer: MEDICARE

## 2025-07-24 PROCEDURE — 90837 PSYTX W PT 60 MINUTES: CPT | Mod: 95

## 2025-07-29 ENCOUNTER — OUTPATIENT (OUTPATIENT)
Dept: OUTPATIENT SERVICES | Facility: HOSPITAL | Age: 80
LOS: 1 days | End: 2025-07-29
Payer: MEDICARE

## 2025-07-29 ENCOUNTER — APPOINTMENT (OUTPATIENT)
Dept: INTERVENTIONAL RADIOLOGY/VASCULAR | Facility: HOSPITAL | Age: 80
End: 2025-07-29

## 2025-07-29 ENCOUNTER — RESULT REVIEW (OUTPATIENT)
Age: 80
End: 2025-07-29

## 2025-07-29 DIAGNOSIS — Z90.710 ACQUIRED ABSENCE OF BOTH CERVIX AND UTERUS: Chronic | ICD-10-CM

## 2025-07-29 DIAGNOSIS — Z98.890 OTHER SPECIFIED POSTPROCEDURAL STATES: Chronic | ICD-10-CM

## 2025-07-29 DIAGNOSIS — Z98.49 CATARACT EXTRACTION STATUS, UNSPECIFIED EYE: Chronic | ICD-10-CM

## 2025-07-29 PROCEDURE — 49083 ABD PARACENTESIS W/IMAGING: CPT

## 2025-07-29 PROCEDURE — C1729: CPT

## 2025-07-30 ENCOUNTER — APPOINTMENT (OUTPATIENT)
Dept: PALLIATIVE MEDICINE | Facility: CLINIC | Age: 80
End: 2025-07-30
Payer: MEDICARE

## 2025-07-30 DIAGNOSIS — F32.A ANXIETY DISORDER, UNSPECIFIED: ICD-10-CM

## 2025-07-30 DIAGNOSIS — C22.1 INTRAHEPATIC BILE DUCT CARCINOMA: ICD-10-CM

## 2025-07-30 DIAGNOSIS — F41.9 ANXIETY DISORDER, UNSPECIFIED: ICD-10-CM

## 2025-07-30 DIAGNOSIS — Z71.89 OTHER SPECIFIED COUNSELING: ICD-10-CM

## 2025-07-30 DIAGNOSIS — Z51.5 ENCOUNTER FOR PALLIATIVE CARE: ICD-10-CM

## 2025-07-30 DIAGNOSIS — R18.0 MALIGNANT ASCITES: ICD-10-CM

## 2025-07-30 PROCEDURE — 99215 OFFICE O/P EST HI 40 MIN: CPT | Mod: 2W

## 2025-07-31 ENCOUNTER — APPOINTMENT (OUTPATIENT)
Dept: PSYCHIATRY | Facility: CLINIC | Age: 80
End: 2025-07-31
Payer: MEDICARE

## 2025-07-31 PROCEDURE — 90837 PSYTX W PT 60 MINUTES: CPT | Mod: 95

## 2025-08-01 ENCOUNTER — NON-APPOINTMENT (OUTPATIENT)
Age: 80
End: 2025-08-01

## 2025-08-04 ENCOUNTER — NON-APPOINTMENT (OUTPATIENT)
Age: 80
End: 2025-08-04

## 2025-08-12 ENCOUNTER — APPOINTMENT (OUTPATIENT)
Dept: INTERVENTIONAL RADIOLOGY/VASCULAR | Facility: HOSPITAL | Age: 80
End: 2025-08-12

## 2025-08-12 ENCOUNTER — OUTPATIENT (OUTPATIENT)
Dept: OUTPATIENT SERVICES | Facility: HOSPITAL | Age: 80
LOS: 1 days | End: 2025-08-12
Payer: MEDICARE

## 2025-08-12 ENCOUNTER — RESULT REVIEW (OUTPATIENT)
Age: 80
End: 2025-08-12

## 2025-08-12 DIAGNOSIS — Z98.890 OTHER SPECIFIED POSTPROCEDURAL STATES: Chronic | ICD-10-CM

## 2025-08-12 DIAGNOSIS — Z90.710 ACQUIRED ABSENCE OF BOTH CERVIX AND UTERUS: Chronic | ICD-10-CM

## 2025-08-12 DIAGNOSIS — Z98.49 CATARACT EXTRACTION STATUS, UNSPECIFIED EYE: Chronic | ICD-10-CM

## 2025-08-12 PROCEDURE — 49083 ABD PARACENTESIS W/IMAGING: CPT

## 2025-08-12 PROCEDURE — C1729: CPT

## 2025-08-13 ENCOUNTER — APPOINTMENT (OUTPATIENT)
Dept: PSYCHIATRY | Facility: CLINIC | Age: 80
End: 2025-08-13
Payer: MEDICARE

## 2025-08-13 DIAGNOSIS — F43.23 ADJUSTMENT DISORDER WITH MIXED ANXIETY AND DEPRESSED MOOD: ICD-10-CM

## 2025-08-13 DIAGNOSIS — R60.0 LOCALIZED EDEMA: ICD-10-CM

## 2025-08-13 PROCEDURE — 90834 PSYTX W PT 45 MINUTES: CPT | Mod: 95

## 2025-08-13 RX ORDER — FUROSEMIDE 20 MG/1
20 TABLET ORAL TWICE DAILY
Qty: 6 | Refills: 0 | Status: ACTIVE | COMMUNITY
Start: 2025-08-13 | End: 1900-01-01

## 2025-08-14 ENCOUNTER — NON-APPOINTMENT (OUTPATIENT)
Age: 80
End: 2025-08-14

## 2025-08-14 PROBLEM — F43.23 ACUTE ADJUSTMENT DISORDER WITH MIXED ANXIETY AND DEPRESSED MOOD: Status: ACTIVE | Noted: 2025-06-08

## 2025-08-14 LAB
HCT VFR BLD CALC: 34.8 %
HGB BLD-MCNC: 10.8 G/DL
MCHC RBC-ENTMCNC: 28.6 PG
MCHC RBC-ENTMCNC: 31 G/DL
MCV RBC AUTO: 92.3 FL
PLATELET # BLD AUTO: 231 K/UL
RBC # BLD: 3.77 M/UL
RBC # FLD: 13.2 %
WBC # FLD AUTO: 8.38 K/UL

## 2025-08-15 LAB
ALBUMIN SERPL ELPH-MCNC: 2.9 G/DL
ALP BLD-CCNC: 259 U/L
ALT SERPL-CCNC: 16 U/L
ANION GAP SERPL CALC-SCNC: 11 MMOL/L
APTT BLD: 28.6 SEC
AST SERPL-CCNC: 50 U/L
BILIRUB SERPL-MCNC: 0.3 MG/DL
BUN SERPL-MCNC: 20 MG/DL
CALCIUM SERPL-MCNC: 8.3 MG/DL
CHLORIDE SERPL-SCNC: 95 MMOL/L
CO2 SERPL-SCNC: 24 MMOL/L
CREAT SERPL-MCNC: 0.87 MG/DL
EGFRCR SERPLBLD CKD-EPI 2021: 69 ML/MIN/1.73M2
GLUCOSE SERPL-MCNC: 109 MG/DL
INR PPP: 0.95 RATIO
POTASSIUM SERPL-SCNC: 5.1 MMOL/L
PROT SERPL-MCNC: 5.2 G/DL
PT BLD: 11.2 SEC
SODIUM SERPL-SCNC: 130 MMOL/L

## 2025-08-18 ENCOUNTER — APPOINTMENT (OUTPATIENT)
Dept: INTERVENTIONAL RADIOLOGY/VASCULAR | Facility: HOSPITAL | Age: 80
End: 2025-08-18

## 2025-08-18 ENCOUNTER — OUTPATIENT (OUTPATIENT)
Dept: OUTPATIENT SERVICES | Facility: HOSPITAL | Age: 80
LOS: 1 days | End: 2025-08-18
Payer: MEDICARE

## 2025-08-18 ENCOUNTER — RESULT REVIEW (OUTPATIENT)
Age: 80
End: 2025-08-18

## 2025-08-18 DIAGNOSIS — Z98.890 OTHER SPECIFIED POSTPROCEDURAL STATES: Chronic | ICD-10-CM

## 2025-08-18 DIAGNOSIS — Z98.49 CATARACT EXTRACTION STATUS, UNSPECIFIED EYE: Chronic | ICD-10-CM

## 2025-08-18 DIAGNOSIS — Z90.710 ACQUIRED ABSENCE OF BOTH CERVIX AND UTERUS: Chronic | ICD-10-CM

## 2025-08-18 PROCEDURE — 49418 INSERT TUN IP CATH PERC: CPT

## 2025-08-18 PROCEDURE — C1769: CPT

## 2025-08-18 PROCEDURE — C1729: CPT

## 2025-08-19 ENCOUNTER — NON-APPOINTMENT (OUTPATIENT)
Age: 80
End: 2025-08-19

## 2025-08-19 RX ORDER — FUROSEMIDE 20 MG/1
20 TABLET ORAL TWICE DAILY
Qty: 6 | Refills: 0 | Status: ACTIVE | COMMUNITY
Start: 2025-08-19 | End: 1900-01-01

## 2025-08-20 ENCOUNTER — NON-APPOINTMENT (OUTPATIENT)
Age: 80
End: 2025-08-20

## 2025-08-25 ENCOUNTER — NON-APPOINTMENT (OUTPATIENT)
Age: 80
End: 2025-08-25

## 2025-08-27 ENCOUNTER — APPOINTMENT (OUTPATIENT)
Dept: PSYCHIATRY | Facility: CLINIC | Age: 80
End: 2025-08-27
Payer: MEDICARE

## 2025-08-27 DIAGNOSIS — F43.23 ADJUSTMENT DISORDER WITH MIXED ANXIETY AND DEPRESSED MOOD: ICD-10-CM

## 2025-08-27 PROCEDURE — 90837 PSYTX W PT 60 MINUTES: CPT | Mod: 95

## 2025-08-29 ENCOUNTER — NON-APPOINTMENT (OUTPATIENT)
Age: 80
End: 2025-08-29

## 2025-09-03 ENCOUNTER — NON-APPOINTMENT (OUTPATIENT)
Age: 80
End: 2025-09-03

## 2025-09-05 ENCOUNTER — APPOINTMENT (OUTPATIENT)
Dept: PALLIATIVE MEDICINE | Facility: CLINIC | Age: 80
End: 2025-09-05
Payer: MEDICARE

## 2025-09-05 DIAGNOSIS — F41.9 ANXIETY DISORDER, UNSPECIFIED: ICD-10-CM

## 2025-09-05 DIAGNOSIS — F32.A ANXIETY DISORDER, UNSPECIFIED: ICD-10-CM

## 2025-09-05 DIAGNOSIS — K21.9 GASTRO-ESOPHAGEAL REFLUX DISEASE W/OUT ESOPHAGITIS: ICD-10-CM

## 2025-09-05 DIAGNOSIS — Z51.5 ENCOUNTER FOR PALLIATIVE CARE: ICD-10-CM

## 2025-09-05 DIAGNOSIS — R43.2 PARAGEUSIA: ICD-10-CM

## 2025-09-05 DIAGNOSIS — R60.0 LOCALIZED EDEMA: ICD-10-CM

## 2025-09-05 DIAGNOSIS — R18.0 MALIGNANT ASCITES: ICD-10-CM

## 2025-09-05 DIAGNOSIS — C22.1 INTRAHEPATIC BILE DUCT CARCINOMA: ICD-10-CM

## 2025-09-05 PROCEDURE — 99215 OFFICE O/P EST HI 40 MIN: CPT | Mod: 2W

## 2025-09-05 RX ORDER — ZINC SULFATE 50(220)MG
220 (50 ZN) CAPSULE ORAL TWICE DAILY
Qty: 60 | Refills: 3 | Status: ACTIVE | COMMUNITY
Start: 2025-09-05 | End: 1900-01-01

## 2025-09-10 ENCOUNTER — APPOINTMENT (OUTPATIENT)
Dept: PSYCHIATRY | Facility: CLINIC | Age: 80
End: 2025-09-10
Payer: MEDICARE

## 2025-09-10 DIAGNOSIS — F43.23 ADJUSTMENT DISORDER WITH MIXED ANXIETY AND DEPRESSED MOOD: ICD-10-CM

## 2025-09-10 PROCEDURE — 90837 PSYTX W PT 60 MINUTES: CPT | Mod: 95

## 2025-09-12 ENCOUNTER — APPOINTMENT (OUTPATIENT)
Dept: PALLIATIVE MEDICINE | Facility: CLINIC | Age: 80
End: 2025-09-12

## 2025-09-12 DIAGNOSIS — R43.2 PARAGEUSIA: ICD-10-CM

## 2025-09-12 PROCEDURE — 99215 OFFICE O/P EST HI 40 MIN: CPT | Mod: 2W

## 2025-09-19 ENCOUNTER — APPOINTMENT (OUTPATIENT)
Dept: PALLIATIVE MEDICINE | Facility: CLINIC | Age: 80
End: 2025-09-19
Payer: MEDICARE

## 2025-09-19 DIAGNOSIS — R60.0 LOCALIZED EDEMA: ICD-10-CM

## 2025-09-19 DIAGNOSIS — Z51.5 ENCOUNTER FOR PALLIATIVE CARE: ICD-10-CM

## 2025-09-19 DIAGNOSIS — F41.9 ANXIETY DISORDER, UNSPECIFIED: ICD-10-CM

## 2025-09-19 DIAGNOSIS — R18.0 MALIGNANT ASCITES: ICD-10-CM

## 2025-09-19 DIAGNOSIS — C22.1 INTRAHEPATIC BILE DUCT CARCINOMA: ICD-10-CM

## 2025-09-19 DIAGNOSIS — F32.A ANXIETY DISORDER, UNSPECIFIED: ICD-10-CM

## 2025-09-19 PROCEDURE — 99215 OFFICE O/P EST HI 40 MIN: CPT | Mod: 2W

## 2025-09-19 RX ORDER — ALBUTEROL SULFATE 90 UG/1
108 (90 BASE) INHALANT RESPIRATORY (INHALATION)
Qty: 1 | Refills: 3 | Status: ACTIVE | COMMUNITY
Start: 2025-09-19 | End: 1900-01-01

## (undated) DEVICE — TROCAR APPLIED MEDICAL KII FIOS FIRST ENTRY 5MM X 100MM Z-THREAD

## (undated) DEVICE — ENDOCATCH 10MM

## (undated) DEVICE — VESSEL LOOP MAXI-BLUE 0.120" X 16"

## (undated) DEVICE — SLEEVE APPLIED MEDICAL KII 5MM X100MM Z-THREAD

## (undated) DEVICE — SUT BOOT STANDARD (ORIGINAL YELLOW) 5 PAIR

## (undated) DEVICE — VENODYNE/SCD SLEEVE CALF MEDIUM

## (undated) DEVICE — DISSECTOR ENDOSCOPIC KITTNER SINGLE TIP

## (undated) DEVICE — DRAPE IOBAN 23" X 23"

## (undated) DEVICE — TUBING STRYKER PNEUMOCLEAR HIGH FLOW

## (undated) DEVICE — GLV 7 PROTEXIS (WHITE)

## (undated) DEVICE — FOLEY TRAY 16FR 5CC LF UMETER CLOSED

## (undated) DEVICE — STAPLER SKIN PROXIMATE

## (undated) DEVICE — SUT PLEDGET CV FELT SQ PTFE 8 X 8MM

## (undated) DEVICE — D HELP - CLEARVIEW CLEARIFY SYSTEM

## (undated) DEVICE — PACK GENERAL LAPAROSCOPY

## (undated) DEVICE — WARMING BLANKET FULL ADULT

## (undated) DEVICE — BLADE SCALPEL SAFETY #15 WITH PLASTIC GREEN HANDLE

## (undated) DEVICE — CANNULA APPLIED MEDICAL KII OPTICAL 12MM X 100MM Z-THREAD

## (undated) DEVICE — Device

## (undated) DEVICE — SUT PLEDGET SOFT MEDIUM 1/4" X 1/8" X 1/16" X6

## (undated) DEVICE — TUBING PLUME AWAY 4.0

## (undated) DEVICE — TROCAR APPLIED MEDICAL KII BALLOON BLUNT TIP 12MM X 100MM

## (undated) DEVICE — POSITIONER PINK PAD PIGAZZI SYSTEM XL W ARM PROTECTOR

## (undated) DEVICE — DRSG DERMABOND 0.7ML

## (undated) DEVICE — INSUFFLATION NDL COVIDIEN SURGINEEDLE VERESS 120MM

## (undated) DEVICE — SUCTION YANKAUER BULBOUS TIP W VENT

## (undated) DEVICE — CLIP APPLR DISP 5MM

## (undated) DEVICE — ELCTR STRYKER NEPTUNE SMOKE EVACUATION PENCIL (GREEN)